# Patient Record
Sex: FEMALE | Race: WHITE | NOT HISPANIC OR LATINO | URBAN - METROPOLITAN AREA
[De-identification: names, ages, dates, MRNs, and addresses within clinical notes are randomized per-mention and may not be internally consistent; named-entity substitution may affect disease eponyms.]

---

## 2021-07-22 ENCOUNTER — EMERGENCY (EMERGENCY)
Facility: HOSPITAL | Age: 61
LOS: 1 days | Discharge: OTHER INSTITUTION | End: 2021-07-22
Attending: STUDENT IN AN ORGANIZED HEALTH CARE EDUCATION/TRAINING PROGRAM
Payer: MEDICARE

## 2021-07-22 VITALS
HEART RATE: 88 BPM | SYSTOLIC BLOOD PRESSURE: 111 MMHG | RESPIRATION RATE: 18 BRPM | OXYGEN SATURATION: 95 % | DIASTOLIC BLOOD PRESSURE: 72 MMHG | TEMPERATURE: 98 F

## 2021-07-22 VITALS
HEART RATE: 91 BPM | HEIGHT: 67 IN | WEIGHT: 199.96 LBS | SYSTOLIC BLOOD PRESSURE: 116 MMHG | RESPIRATION RATE: 18 BRPM | TEMPERATURE: 98 F | OXYGEN SATURATION: 93 % | DIASTOLIC BLOOD PRESSURE: 89 MMHG

## 2021-07-22 DIAGNOSIS — F32.9 MAJOR DEPRESSIVE DISORDER, SINGLE EPISODE, UNSPECIFIED: ICD-10-CM

## 2021-07-22 LAB
ALBUMIN SERPL ELPH-MCNC: 4.1 G/DL — SIGNIFICANT CHANGE UP (ref 3.3–5.2)
ALP SERPL-CCNC: 81 U/L — SIGNIFICANT CHANGE UP (ref 40–120)
ALT FLD-CCNC: 17 U/L — SIGNIFICANT CHANGE UP
AMPHET UR-MCNC: NEGATIVE — SIGNIFICANT CHANGE UP
ANION GAP SERPL CALC-SCNC: 10 MMOL/L — SIGNIFICANT CHANGE UP (ref 5–17)
APAP SERPL-MCNC: <3 UG/ML — LOW (ref 10–26)
APPEARANCE UR: CLEAR — SIGNIFICANT CHANGE UP
AST SERPL-CCNC: 20 U/L — SIGNIFICANT CHANGE UP
BACTERIA # UR AUTO: ABNORMAL
BARBITURATES UR SCN-MCNC: NEGATIVE — SIGNIFICANT CHANGE UP
BASOPHILS # BLD AUTO: 0.05 K/UL — SIGNIFICANT CHANGE UP (ref 0–0.2)
BASOPHILS NFR BLD AUTO: 0.4 % — SIGNIFICANT CHANGE UP (ref 0–2)
BENZODIAZ UR-MCNC: NEGATIVE — SIGNIFICANT CHANGE UP
BILIRUB SERPL-MCNC: 0.2 MG/DL — LOW (ref 0.4–2)
BILIRUB UR-MCNC: NEGATIVE — SIGNIFICANT CHANGE UP
BUN SERPL-MCNC: 13.2 MG/DL — SIGNIFICANT CHANGE UP (ref 8–20)
CALCIUM SERPL-MCNC: 9.2 MG/DL — SIGNIFICANT CHANGE UP (ref 8.6–10.2)
CHLORIDE SERPL-SCNC: 102 MMOL/L — SIGNIFICANT CHANGE UP (ref 98–107)
CO2 SERPL-SCNC: 27 MMOL/L — SIGNIFICANT CHANGE UP (ref 22–29)
COCAINE METAB.OTHER UR-MCNC: NEGATIVE — SIGNIFICANT CHANGE UP
COLOR SPEC: YELLOW — SIGNIFICANT CHANGE UP
CREAT SERPL-MCNC: 0.7 MG/DL — SIGNIFICANT CHANGE UP (ref 0.5–1.3)
DIFF PNL FLD: NEGATIVE — SIGNIFICANT CHANGE UP
EOSINOPHIL # BLD AUTO: 0.02 K/UL — SIGNIFICANT CHANGE UP (ref 0–0.5)
EOSINOPHIL NFR BLD AUTO: 0.2 % — SIGNIFICANT CHANGE UP (ref 0–6)
EPI CELLS # UR: ABNORMAL
ETHANOL SERPL-MCNC: <10 MG/DL — SIGNIFICANT CHANGE UP (ref 0–9)
GLUCOSE SERPL-MCNC: 112 MG/DL — HIGH (ref 70–99)
GLUCOSE UR QL: NEGATIVE MG/DL — SIGNIFICANT CHANGE UP
HCT VFR BLD CALC: 44.5 % — SIGNIFICANT CHANGE UP (ref 34.5–45)
HGB BLD-MCNC: 14.6 G/DL — SIGNIFICANT CHANGE UP (ref 11.5–15.5)
IMM GRANULOCYTES NFR BLD AUTO: 0.6 % — SIGNIFICANT CHANGE UP (ref 0–1.5)
KETONES UR-MCNC: NEGATIVE — SIGNIFICANT CHANGE UP
LEUKOCYTE ESTERASE UR-ACNC: NEGATIVE — SIGNIFICANT CHANGE UP
LYMPHOCYTES # BLD AUTO: 1.15 K/UL — SIGNIFICANT CHANGE UP (ref 1–3.3)
LYMPHOCYTES # BLD AUTO: 9.8 % — LOW (ref 13–44)
MCHC RBC-ENTMCNC: 28.7 PG — SIGNIFICANT CHANGE UP (ref 27–34)
MCHC RBC-ENTMCNC: 32.8 GM/DL — SIGNIFICANT CHANGE UP (ref 32–36)
MCV RBC AUTO: 87.4 FL — SIGNIFICANT CHANGE UP (ref 80–100)
METHADONE UR-MCNC: NEGATIVE — SIGNIFICANT CHANGE UP
MONOCYTES # BLD AUTO: 0.49 K/UL — SIGNIFICANT CHANGE UP (ref 0–0.9)
MONOCYTES NFR BLD AUTO: 4.2 % — SIGNIFICANT CHANGE UP (ref 2–14)
NEUTROPHILS # BLD AUTO: 9.94 K/UL — HIGH (ref 1.8–7.4)
NEUTROPHILS NFR BLD AUTO: 84.8 % — HIGH (ref 43–77)
NITRITE UR-MCNC: NEGATIVE — SIGNIFICANT CHANGE UP
OPIATES UR-MCNC: NEGATIVE — SIGNIFICANT CHANGE UP
PCP SPEC-MCNC: SIGNIFICANT CHANGE UP
PCP UR-MCNC: NEGATIVE — SIGNIFICANT CHANGE UP
PH UR: 6.5 — SIGNIFICANT CHANGE UP (ref 5–8)
PLATELET # BLD AUTO: 233 K/UL — SIGNIFICANT CHANGE UP (ref 150–400)
POTASSIUM SERPL-MCNC: 4.8 MMOL/L — SIGNIFICANT CHANGE UP (ref 3.5–5.3)
POTASSIUM SERPL-SCNC: 4.8 MMOL/L — SIGNIFICANT CHANGE UP (ref 3.5–5.3)
PROT SERPL-MCNC: 7.4 G/DL — SIGNIFICANT CHANGE UP (ref 6.6–8.7)
PROT UR-MCNC: 15 MG/DL
RBC # BLD: 5.09 M/UL — SIGNIFICANT CHANGE UP (ref 3.8–5.2)
RBC # FLD: 13 % — SIGNIFICANT CHANGE UP (ref 10.3–14.5)
RBC CASTS # UR COMP ASSIST: SIGNIFICANT CHANGE UP /HPF (ref 0–4)
SALICYLATES SERPL-MCNC: <0.6 MG/DL — LOW (ref 10–20)
SARS-COV-2 RNA SPEC QL NAA+PROBE: SIGNIFICANT CHANGE UP
SODIUM SERPL-SCNC: 139 MMOL/L — SIGNIFICANT CHANGE UP (ref 135–145)
SP GR SPEC: 1.01 — SIGNIFICANT CHANGE UP (ref 1.01–1.02)
THC UR QL: NEGATIVE — SIGNIFICANT CHANGE UP
TSH SERPL-MCNC: 1.31 UIU/ML — SIGNIFICANT CHANGE UP (ref 0.27–4.2)
UROBILINOGEN FLD QL: NEGATIVE MG/DL — SIGNIFICANT CHANGE UP
WBC # BLD: 11.72 K/UL — HIGH (ref 3.8–10.5)
WBC # FLD AUTO: 11.72 K/UL — HIGH (ref 3.8–10.5)
WBC UR QL: SIGNIFICANT CHANGE UP

## 2021-07-22 PROCEDURE — 36415 COLL VENOUS BLD VENIPUNCTURE: CPT

## 2021-07-22 PROCEDURE — 81001 URINALYSIS AUTO W/SCOPE: CPT

## 2021-07-22 PROCEDURE — U0003: CPT

## 2021-07-22 PROCEDURE — 80053 COMPREHEN METABOLIC PANEL: CPT

## 2021-07-22 PROCEDURE — 99291 CRITICAL CARE FIRST HOUR: CPT

## 2021-07-22 PROCEDURE — 90792 PSYCH DIAG EVAL W/MED SRVCS: CPT

## 2021-07-22 PROCEDURE — 93005 ELECTROCARDIOGRAM TRACING: CPT

## 2021-07-22 PROCEDURE — 84443 ASSAY THYROID STIM HORMONE: CPT

## 2021-07-22 PROCEDURE — 93010 ELECTROCARDIOGRAM REPORT: CPT

## 2021-07-22 PROCEDURE — U0005: CPT

## 2021-07-22 PROCEDURE — 85025 COMPLETE CBC W/AUTO DIFF WBC: CPT

## 2021-07-22 PROCEDURE — 80307 DRUG TEST PRSMV CHEM ANLYZR: CPT

## 2021-07-22 PROCEDURE — 93010 ELECTROCARDIOGRAM REPORT: CPT | Mod: 77

## 2021-07-22 PROCEDURE — 94640 AIRWAY INHALATION TREATMENT: CPT

## 2021-07-22 RX ORDER — IPRATROPIUM/ALBUTEROL SULFATE 18-103MCG
3 AEROSOL WITH ADAPTER (GRAM) INHALATION ONCE
Refills: 0 | Status: COMPLETED | OUTPATIENT
Start: 2021-07-22 | End: 2021-07-22

## 2021-07-22 RX ORDER — FLUOXETINE HCL 10 MG
20 CAPSULE ORAL DAILY
Refills: 0 | Status: DISCONTINUED | OUTPATIENT
Start: 2021-07-23 | End: 2021-07-26

## 2021-07-22 RX ADMIN — Medication 3 MILLILITER(S): at 12:20

## 2021-07-22 NOTE — ED PROVIDER NOTE - NS ED ROS FT
Constitutional: no fever, no chills  Head: NC, AT   Eyes: no redness   ENMT: no nasal congestion/drainage, no sore throat   CV: no chest pain, no edema, no palpitations   Resp: no cough, no dyspnea  GI: no abdominal pain, +nausea, no vomiting, no diarrhea  : no dysuria, no hematuria   Skin: no lesions, no rashes   Neuro: no LOC, no headache, no sensory deficits, +generalized weakness  Psych: +depression, +SA, denies AH/VH

## 2021-07-22 NOTE — ED BEHAVIORAL HEALTH ASSESSMENT NOTE - SUMMARY
Patient is a 60 year old female who is currently on SSD, living with her boyfriend of 20 years, with a self reported history of Depression and PTSD, with a history of multiple inpatient psych admissions, 1 prior suicide attempt by OD, and [facility], who was BIBA for OD of Trazodone.     Patient was seen and evaluated and found with worsening depression, feelings of hopelessness and helplessness status post suicide attempt by overdose. Patient is a current danger to self and requires inpatient psychiatric admission. Patient to be admitted under involuntary status (DOCS).

## 2021-07-22 NOTE — ED BEHAVIORAL HEALTH ASSESSMENT NOTE - DESCRIPTION
Vital Signs Last 24 Hrs  T(C): 36.9 (22 Jul 2021 10:31), Max: 36.9 (22 Jul 2021 10:31)  T(F): 98.4 (22 Jul 2021 10:31), Max: 98.4 (22 Jul 2021 10:31)  HR: 74 (22 Jul 2021 12:56) (74 - 91)  BP: 118/60 (22 Jul 2021 12:56) (116/89 - 118/60)  BP(mean): --  RR: 18 (22 Jul 2021 12:56) (18 - 18)  SpO2: 92% (22 Jul 2021 12:56) (92% - 93%) COPD never , no children, lives with boyfriend of 20 years. On SSD for her mental illness

## 2021-07-22 NOTE — ED BEHAVIORAL HEALTH ASSESSMENT NOTE - RISK ASSESSMENT
worsening depression, feelings of hopelessness and helplessness status post suicide attempt by overdose Moderate Acute Suicide Risk

## 2021-07-22 NOTE — ED ADULT NURSE REASSESSMENT NOTE - NS ED NURSE REASSESS COMMENT FT1
assumed care of patient.  Pt alert and cooperative. patient states that she took the trazodone to go to sleep and then to be admitted to hospital for rest.  Pt states that she does not receive any help from the  for her boyfriend of 20 years and feels overwhelmed.  Pt made aware of possible transfer to [facility] pending urine tox.  Will monitor an chart changes

## 2021-07-22 NOTE — ED BEHAVIORAL HEALTH ASSESSMENT NOTE - HPI (INCLUDE ILLNESS QUALITY, SEVERITY, DURATION, TIMING, CONTEXT, MODIFYING FACTORS, ASSOCIATED SIGNS AND SYMPTOMS)
Patient is a 60 year old female who is currently on SSD, living with her boyfriend of 20 years, with a self reported history of Depression and PTSD, with a history of multiple inpatient psych admissions, 1 prior suicide attempt by OD, and currently following with [facility], who was BIBA for OD of Trazodone.     Patient was seen and evaluated and found to be calm and cooperative. Patient stating that her depression has been worsening and states today she ended up taking her entire bottle of Trazodone. When asked about her intent she first stating "I done know, I just wanted to sleep" but when asked again did admit to it being a suicide attempt. Patient talking about recent stressors including her boy friends health conditions and financial stressors. Patient stats for weeks now she has no motivation to do anything, lays in bed but had sleep and appetite difficulties and has been having thoughts of dying but with no intent until today. Patient denies any homicidal ideations as well as any symptoms of nelli or A V hallucinations.     Collateral info taken from patients boyfriend Castro who states patient has been very depressed recently and spending a lot of time in bed but did not realize it was "this bad". he state s today he came into the kitchen and she didn't look well which is when she admitted to taking a bottle of pills which is  when he called 911.

## 2021-07-22 NOTE — ED BEHAVIORAL HEALTH ASSESSMENT NOTE - OTHER PAST PSYCHIATRIC HISTORY (INCLUDE DETAILS REGARDING ONSET, COURSE OF ILLNESS, INPATIENT/OUTPATIENT TREATMENT)
self reported history of Depression and PTSD (states she witnessed her fathers death as a child? )   History of multiple past admissions and 1 prior suicide attempt by OD. Patient follows with [facility] Psych NP Nithya Marmolejo

## 2021-07-22 NOTE — ED PROVIDER NOTE - PROGRESS NOTE DETAILS
Jose Enrique: I discussed the patient's case with poison control. Poison control recommends observing the patient for about 6 hours assuming she took trazodone immediate release vs 12-16 hours if she took trazodone extended release. Recommendation is supportive care with close attention to any CNS depression, bradycardia, hypotension, symptoms c/w serotonin syndrome. Plan is to keep patient on cardiac monitor, obtain labs, call poison control once labs return. Jose Enrique: I consulted psychiatry team. Jose Enrique: Patient stated that It would be okay for me to speak with her boyfriend Fredy "we don't have secrets." I updated Fredy on the phone. Jose Enrique: I reviewed patient's case with [place] poison control. Will obtain repeat EKG and continue to monitor. Vital signs wnl. Jose Enrique: I spoke with [facility]. Pt is prescribed immediate release trazodone. Per poison control, recommended observation period for IR trazodone is 6 hours. Jose Enrique: Case discussed with . Pt will be moved to  as she is medically clear.

## 2021-07-22 NOTE — ED PROVIDER NOTE - CLINICAL SUMMARY MEDICAL DECISION MAKING FREE TEXT BOX
60 year old female with history of multiple psychiatric admissions, PTSD, and COPD who presented following intentional overdose in suicide attempt. 60 year old female with history of multiple psychiatric admissions, PTSD, and COPD who presented following intentional overdose in suicide attempt. EKG x 2 without clinically significant qt prolongation or ischemic changes. Labs WNL and pt remained hemodynamically stable for greater than 6 hours in the ED. Pt medically cleared and will move to  for further eval.

## 2021-07-22 NOTE — ED PROVIDER NOTE - ATTENDING CONTRIBUTION TO CARE
Documentation, interpretation of results, consultation with other physicians.    59 y/o F with PMH COPD, PTSD presents for intentional overdose of two half bottles of 50 mg Trazadone around 830 this morning, vomited once around 9 am, complaining of generalized weakness and tiredness, no history of suicide attempts, but has felt depressed and suicidal thoughts in the past.     AP - Well appearing, NAD, moist mucus membranes, PERRL, pupils 3 mm symmetrically, no tremors or fasiculations, no pronator drift, no clonus, skin dry and warm, no flushing, abd soft, non-tender, non-distended, RRR, lungs with tight inspiratory and expiratory wheezing, no LE edema, normal Babinski, normal reflexes.    Consultation with poison control regarding trazadone overdose, EKG, cardiac and pulse ox monitoring, psych consult, will require psych admission when medically cleared.

## 2021-07-22 NOTE — ED PROVIDER NOTE - OBJECTIVE STATEMENT
60 year old female with history of multiple psychiatric admissions, PTSD, and COPD who presents following intentional overdose. This morning at 08:30 the patient swallowed 2 x 1/2 bottles of 30 day supply of trazodone 50 mg in a suicide attempt. At ~09:00 she had one episode of non-bloody emesis. She told her boyfriend Fredy, who she lives with, and he called EMS. On ED arrival, patient awake and alert. Reports some nausea and generalized weakness. No sweats, tremors, stiffness, fevers, chills, palpitations, chest pain, shortness of breath, or abdominal pain. No AH/VH. The patient states she did NOT take anything else this morning. Her daily medications are prozac 20 mg QAM and olazapine 10 mg QHS - she did not take her prozac this morning (or any of her olazapine). She follows with a therapist at [facility] and last spoke to them ~2 weeks ago. Has smoked about 10 cigarettes/day for the last 50 years. Denies alcohol and ilicit drug use.

## 2021-07-22 NOTE — CHART NOTE - NSCHARTNOTEFT_GEN_A_CORE
PATRICIA Note: PATRICIA made aware by  provider pt is in need of inpt psych trx on involuntary basis, DOCS legals completed. PATRICIA placed call to [facility], spoke to Emily who reports beds are available. PATRICIA faxed EKG and legals for review. PATRICIA received message back, case is under review, pending UA and Utox to acept pt. Pt provided sample, awaiting results at this time

## 2021-07-22 NOTE — ED PROVIDER NOTE - PHYSICAL EXAMINATION
General: well appearing, NAD  Head: NC, AT  EENT: pupils equal, EOMI, no scleral icterus, moist MM   Cardiac: RRR (HR 72), no apparent murmurs, no lower extremity edema  Respiratory: expiratory wheeze, no respiratory distress   Abdomen: soft, ND, NT, nonperitonitic  MSK/Vascular: full ROM, soft compartments, warm extremities, no rigidity  Neuro: AAOx3, sensation to light touch intact, 5/5 UE strength   Psych: mood "gloomy," +SI with SA this AM, no VH, no AH, no paranoia

## 2021-07-22 NOTE — ED ADULT NURSE REASSESSMENT NOTE - DESCRIPTION
Patient comes to  after being medically cleared in main ED where she presented after overdosing on Trazadone. Pt admitted she took overdose in a suicide attempt. Stated she has been depressed for several years, and has been taking medications, but they aren't effective and she is just becoming more depressed. Endorsed need for help, and expressed understanding of plan to send inpatient when bed available. Polite and cooperative with intake interview.

## 2021-07-22 NOTE — ED BEHAVIORAL HEALTH ASSESSMENT NOTE - NSPRESENTSXS_PSY_ALL_CORE
Eloped (saw a physician/midlevel provider but left without telling anyone) Depressed mood/Anhedonia/Hopelessness or despair

## 2021-07-22 NOTE — ED ADULT NURSE REASSESSMENT NOTE - NS ED NURSE REASSESS COMMENT FT1
Pt accepted at [facility] by Sr Naidu.  ambulance transportation arrangements made.  Awaiting on arrival.  Pt requesting to shower and set up for same.  Will monitor and chart changes and maintain safe environment

## 2021-09-04 ENCOUNTER — EMERGENCY (EMERGENCY)
Facility: HOSPITAL | Age: 61
LOS: 1 days | Discharge: OTHER INSTITUTION | End: 2021-09-04
Attending: STUDENT IN AN ORGANIZED HEALTH CARE EDUCATION/TRAINING PROGRAM
Payer: MEDICARE

## 2021-09-04 VITALS
OXYGEN SATURATION: 100 % | RESPIRATION RATE: 18 BRPM | HEART RATE: 65 BPM | TEMPERATURE: 98 F | HEIGHT: 67 IN | SYSTOLIC BLOOD PRESSURE: 103 MMHG | DIASTOLIC BLOOD PRESSURE: 70 MMHG | WEIGHT: 199.96 LBS

## 2021-09-04 DIAGNOSIS — T50.902D POISONING BY UNSPECIFIED DRUGS, MEDICAMENTS AND BIOLOGICAL SUBSTANCES, INTENTIONAL SELF-HARM, SUBSEQUENT ENCOUNTER: ICD-10-CM

## 2021-09-04 DIAGNOSIS — F32.9 MAJOR DEPRESSIVE DISORDER, SINGLE EPISODE, UNSPECIFIED: ICD-10-CM

## 2021-09-04 PROBLEM — F43.10 POST-TRAUMATIC STRESS DISORDER, UNSPECIFIED: Chronic | Status: ACTIVE | Noted: 2021-07-22

## 2021-09-04 PROBLEM — J44.9 CHRONIC OBSTRUCTIVE PULMONARY DISEASE, UNSPECIFIED: Chronic | Status: ACTIVE | Noted: 2021-07-22

## 2021-09-04 LAB
ALBUMIN SERPL ELPH-MCNC: 4.1 G/DL — SIGNIFICANT CHANGE UP (ref 3.3–5.2)
ALP SERPL-CCNC: 92 U/L — SIGNIFICANT CHANGE UP (ref 40–120)
ALT FLD-CCNC: 17 U/L — SIGNIFICANT CHANGE UP
AMPHET UR-MCNC: NEGATIVE — SIGNIFICANT CHANGE UP
ANION GAP SERPL CALC-SCNC: 13 MMOL/L — SIGNIFICANT CHANGE UP (ref 5–17)
APAP SERPL-MCNC: <3 UG/ML — LOW (ref 10–26)
APAP SERPL-MCNC: <3 UG/ML — LOW (ref 10–26)
APPEARANCE UR: CLEAR — SIGNIFICANT CHANGE UP
AST SERPL-CCNC: 20 U/L — SIGNIFICANT CHANGE UP
BARBITURATES UR SCN-MCNC: NEGATIVE — SIGNIFICANT CHANGE UP
BASE EXCESS BLDV CALC-SCNC: 3.3 MMOL/L — HIGH (ref -2–3)
BASOPHILS # BLD AUTO: 0.03 K/UL — SIGNIFICANT CHANGE UP (ref 0–0.2)
BASOPHILS NFR BLD AUTO: 0.3 % — SIGNIFICANT CHANGE UP (ref 0–2)
BENZODIAZ UR-MCNC: NEGATIVE — SIGNIFICANT CHANGE UP
BILIRUB SERPL-MCNC: 0.3 MG/DL — LOW (ref 0.4–2)
BILIRUB UR-MCNC: NEGATIVE — SIGNIFICANT CHANGE UP
BUN SERPL-MCNC: 14.3 MG/DL — SIGNIFICANT CHANGE UP (ref 8–20)
CA-I SERPL-SCNC: 1.12 MMOL/L — LOW (ref 1.15–1.33)
CALCIUM SERPL-MCNC: 9.4 MG/DL — SIGNIFICANT CHANGE UP (ref 8.6–10.2)
CHLORIDE BLDV-SCNC: 104 MMOL/L — SIGNIFICANT CHANGE UP (ref 98–107)
CHLORIDE SERPL-SCNC: 101 MMOL/L — SIGNIFICANT CHANGE UP (ref 98–107)
CK MB CFR SERPL CALC: 2.8 NG/ML — SIGNIFICANT CHANGE UP (ref 0–6.7)
CK SERPL-CCNC: 194 U/L — HIGH (ref 25–170)
CO2 SERPL-SCNC: 24 MMOL/L — SIGNIFICANT CHANGE UP (ref 22–29)
COCAINE METAB.OTHER UR-MCNC: NEGATIVE — SIGNIFICANT CHANGE UP
COLOR SPEC: YELLOW — SIGNIFICANT CHANGE UP
CREAT SERPL-MCNC: 0.74 MG/DL — SIGNIFICANT CHANGE UP (ref 0.5–1.3)
DIFF PNL FLD: NEGATIVE — SIGNIFICANT CHANGE UP
EOSINOPHIL # BLD AUTO: 0.06 K/UL — SIGNIFICANT CHANGE UP (ref 0–0.5)
EOSINOPHIL NFR BLD AUTO: 0.6 % — SIGNIFICANT CHANGE UP (ref 0–6)
EPI CELLS # UR: SIGNIFICANT CHANGE UP
ETHANOL SERPL-MCNC: <10 MG/DL — SIGNIFICANT CHANGE UP (ref 0–9)
GAS PNL BLDV: 137 MMOL/L — SIGNIFICANT CHANGE UP (ref 136–145)
GAS PNL BLDV: SIGNIFICANT CHANGE UP
GLUCOSE BLDV-MCNC: 113 MG/DL — HIGH (ref 70–99)
GLUCOSE SERPL-MCNC: 195 MG/DL — HIGH (ref 70–99)
GLUCOSE UR QL: NEGATIVE MG/DL — SIGNIFICANT CHANGE UP
HCO3 BLDV-SCNC: 28 MMOL/L — SIGNIFICANT CHANGE UP (ref 22–29)
HCT VFR BLD CALC: 41.4 % — SIGNIFICANT CHANGE UP (ref 34.5–45)
HCT VFR BLDA CALC: 40 % — SIGNIFICANT CHANGE UP (ref 34–46)
HGB BLD CALC-MCNC: 13.4 G/DL — SIGNIFICANT CHANGE UP (ref 11.7–16.1)
HGB BLD-MCNC: 13.9 G/DL — SIGNIFICANT CHANGE UP (ref 11.5–15.5)
IMM GRANULOCYTES NFR BLD AUTO: 0.8 % — SIGNIFICANT CHANGE UP (ref 0–1.5)
KETONES UR-MCNC: ABNORMAL
LACTATE BLDV-MCNC: 1.3 MMOL/L — SIGNIFICANT CHANGE UP (ref 0.5–2)
LEUKOCYTE ESTERASE UR-ACNC: ABNORMAL
LYMPHOCYTES # BLD AUTO: 1.43 K/UL — SIGNIFICANT CHANGE UP (ref 1–3.3)
LYMPHOCYTES # BLD AUTO: 13.7 % — SIGNIFICANT CHANGE UP (ref 13–44)
MCHC RBC-ENTMCNC: 29.3 PG — SIGNIFICANT CHANGE UP (ref 27–34)
MCHC RBC-ENTMCNC: 33.6 GM/DL — SIGNIFICANT CHANGE UP (ref 32–36)
MCV RBC AUTO: 87.3 FL — SIGNIFICANT CHANGE UP (ref 80–100)
METHADONE UR-MCNC: NEGATIVE — SIGNIFICANT CHANGE UP
MONOCYTES # BLD AUTO: 0.3 K/UL — SIGNIFICANT CHANGE UP (ref 0–0.9)
MONOCYTES NFR BLD AUTO: 2.9 % — SIGNIFICANT CHANGE UP (ref 2–14)
NEUTROPHILS # BLD AUTO: 8.56 K/UL — HIGH (ref 1.8–7.4)
NEUTROPHILS NFR BLD AUTO: 81.7 % — HIGH (ref 43–77)
NITRITE UR-MCNC: NEGATIVE — SIGNIFICANT CHANGE UP
OPIATES UR-MCNC: NEGATIVE — SIGNIFICANT CHANGE UP
PCO2 BLDV: 44 MMHG — HIGH (ref 39–42)
PCP SPEC-MCNC: SIGNIFICANT CHANGE UP
PCP UR-MCNC: NEGATIVE — SIGNIFICANT CHANGE UP
PH BLDV: 7.41 — SIGNIFICANT CHANGE UP (ref 7.32–7.43)
PH UR: 6 — SIGNIFICANT CHANGE UP (ref 5–8)
PLATELET # BLD AUTO: 229 K/UL — SIGNIFICANT CHANGE UP (ref 150–400)
PO2 BLDV: 126 MMHG — HIGH (ref 25–45)
POTASSIUM BLDV-SCNC: 3.7 MMOL/L — SIGNIFICANT CHANGE UP (ref 3.5–5.1)
POTASSIUM SERPL-MCNC: 3.5 MMOL/L — SIGNIFICANT CHANGE UP (ref 3.5–5.3)
POTASSIUM SERPL-SCNC: 3.5 MMOL/L — SIGNIFICANT CHANGE UP (ref 3.5–5.3)
PROT SERPL-MCNC: 7.6 G/DL — SIGNIFICANT CHANGE UP (ref 6.6–8.7)
PROT UR-MCNC: 15 MG/DL
RBC # BLD: 4.74 M/UL — SIGNIFICANT CHANGE UP (ref 3.8–5.2)
RBC # FLD: 12.8 % — SIGNIFICANT CHANGE UP (ref 10.3–14.5)
RBC CASTS # UR COMP ASSIST: SIGNIFICANT CHANGE UP /HPF (ref 0–4)
SALICYLATES SERPL-MCNC: <0.6 MG/DL — LOW (ref 10–20)
SAO2 % BLDV: 99.7 % — SIGNIFICANT CHANGE UP
SARS-COV-2 RNA SPEC QL NAA+PROBE: SIGNIFICANT CHANGE UP
SODIUM SERPL-SCNC: 138 MMOL/L — SIGNIFICANT CHANGE UP (ref 135–145)
SP GR SPEC: 1.02 — SIGNIFICANT CHANGE UP (ref 1.01–1.02)
THC UR QL: NEGATIVE — SIGNIFICANT CHANGE UP
TSH SERPL-MCNC: 1.3 UIU/ML — SIGNIFICANT CHANGE UP (ref 0.27–4.2)
UROBILINOGEN FLD QL: NEGATIVE MG/DL — SIGNIFICANT CHANGE UP
WBC # BLD: 10.46 K/UL — SIGNIFICANT CHANGE UP (ref 3.8–10.5)
WBC # FLD AUTO: 10.46 K/UL — SIGNIFICANT CHANGE UP (ref 3.8–10.5)
WBC UR QL: SIGNIFICANT CHANGE UP

## 2021-09-04 PROCEDURE — 99220: CPT

## 2021-09-04 PROCEDURE — 93010 ELECTROCARDIOGRAM REPORT: CPT

## 2021-09-04 PROCEDURE — 90792 PSYCH DIAG EVAL W/MED SRVCS: CPT

## 2021-09-04 RX ORDER — SODIUM CHLORIDE 9 MG/ML
1000 INJECTION INTRAMUSCULAR; INTRAVENOUS; SUBCUTANEOUS ONCE
Refills: 0 | Status: COMPLETED | OUTPATIENT
Start: 2021-09-04 | End: 2021-09-04

## 2021-09-04 RX ADMIN — SODIUM CHLORIDE 1000 MILLILITER(S): 9 INJECTION INTRAMUSCULAR; INTRAVENOUS; SUBCUTANEOUS at 11:36

## 2021-09-04 NOTE — ED PROVIDER NOTE - CLINICAL SUMMARY MEDICAL DECISION MAKING FREE TEXT BOX
59yo female with pmh of depression and anxiety presents with overdose and SA. 61yo female with pmh of depression and anxiety presents with overdose and SA. Pt observed for more than 6 hours, more awake now, depressed affect, EKG at baseline, labs wnl, no acute tele events, pt cleared medically for BH eval

## 2021-09-04 NOTE — ED CDU PROVIDER INITIAL DAY NOTE - MEDICAL DECISION MAKING DETAILS
59yo female with pmh of depression and anxiety presents with overdose and SA. Pt observed for more than 6 hours, more awake now, depressed affect, EKG at baseline, labs wnl, no acute tele events, pt cleared medically for BH eval

## 2021-09-04 NOTE — ED BEHAVIORAL HEALTH ASSESSMENT NOTE - DESCRIPTION
COPD T(C): 36.3 (09-04-21 @ 20:06), Max: 36.8 (09-04-21 @ 14:31)  T(F): 97.3 (09-04-21 @ 20:06), Max: 98.3 (09-04-21 @ 14:31)  HR: 98 (09-04-21 @ 20:06) (65 - 98)  BP: 181/101 (09-04-21 @ 20:06) (103/70 - 181/101)  RR: 18 (09-04-21 @ 20:06) (18 - 20)  SpO2: 97% (09-04-21 @ 20:06) (95% - 100%) never , no children, lives with boyfriend of 20 years. On SSD for her mental illness

## 2021-09-04 NOTE — ED PROVIDER NOTE - CARE PLAN
1 Principal Discharge DX:	Overdose  Secondary Diagnosis:	Suicidal behavior with attempted self-injury

## 2021-09-04 NOTE — ED BEHAVIORAL HEALTH ASSESSMENT NOTE - NSBHDSMAXES_PSY_ALL_CORE
Detail Level: Zone
Additional Notes: Monitor lesion; if it not resolved with liquid nitrogen, consider biopsy.
See above

## 2021-09-04 NOTE — ED ADULT NURSE NOTE - OBJECTIVE STATEMENT
OB pt ambulatory into ED, BIBA, from home a&ox3, no acute distress, breaths even and unlabored c/o SI x 1 week. as per ems, pt took 20 pills of her 10mg olanzapine and 20 pills of her 1mg minipress at about 0920am today. denies HI. denies etoh use. dr. riggs called to bedside for eval.

## 2021-09-04 NOTE — ED PROVIDER NOTE - PHYSICAL EXAMINATION
Const: Drowsy easily arousable to verbal stimuli but goes back to sleep In no acute distress. Well appearing.  HEENT: NC/AT. Moist mucous membranes.  Eyes: No scleral icterus. EOMI. 2mm PERRL  Neck:. Soft and supple. Full ROM without pain.  Cardiac: Regular rate and regular rhythm. +S1/S2. Peripheral pulses 2+ and symmetric. No LE edema.  Resp: Speaking in full sentences. No evidence of respiratory distress. No wheezes, rales or rhonchi.  Abd: Soft, non-tender, non-distended. Normal bowel sounds in all 4 quadrants. No guarding or rebound.  Back: Spine midline and non-tender. No CVAT.  Skin: No rashes, abrasions or lacerations.  Lymph: No cervical lymphadenopathy.  Neuro: Awake, alert & oriented x 3. Moves all extremities symmetrically.

## 2021-09-04 NOTE — ED BEHAVIORAL HEALTH ASSESSMENT NOTE - ADDITIONAL DETAILS / COMMENTS
Pt present with periods of confusion and disorientation likely delirium from OD, need redirection.  Dr Castellon informed of AMS and need for further monitoring prior to transfer to inpt psych.

## 2021-09-04 NOTE — ED PROVIDER NOTE - OBJECTIVE STATEMENT
59yo female with pmh of depression and anxiety presents with overdose and SA. PT states she doesn't want to live anymore because there's nothing left her for her. PT states that prior to arrival unsure what time she took 20 pills of 10mg olanzapine and 20 pills of 1mg minipress. Pt states her  called EMS. No physical signs of trauma

## 2021-09-04 NOTE — ED BEHAVIORAL HEALTH ASSESSMENT NOTE - SUMMARY
Patient is a 60 year old female who is currently on SSD, living with her boyfriend of 20 years, with a self reported history of Depression and PTSD, with a history of multiple inpatient psych admissions, last July, 2021,  2 prior suicide attempt by OD, and currently following with [facility], who was BIBA EMS activated by BF s/p intentional OD of her prescribed meds.  Pt reportedly took 20, 10 mg Olanzapine and 20,  1mg Minipress in an attempt to end her life.  Pt observed in main ED and now in BH area where she is disoriented and confused asking for her clothes from the dryer.  Pt paranoid and thinks people are stealing her belongings.  Pt ambulating to bathroom to give urine, urinate without giving specimen.  Pt needs redirection to her room.  Pt admitted to taking an OD, claiming she has nothing to live for.  Asked about her  and states she has a "fake " as they are not .  Pt states she used to want to get  but no longer.  When asked about the OD stated she did not want o talk about in and c/o not feeling well holding her abdomen but denies nausea.  Collaterals obtained from SO, Castro who reports they both woke up at the same time, he showered and asked Pt to make him toast.  He left the home for a short while and when he returned he learned she took an OD of her prescribed meds and he activated 911.  Castro reported she has been depressed and gets tx at [facility] and feels she did not get adequate therapy there.  Pt Prozac has been 40 mg since last admission in July, Olanzapine 10 mg qd, Minipress for "sleep" and no longer takes Trazodone due to last suicide attempt by OD Trazodone.  Castro denies Pt uses drugs or alcohol.  Pt will require psych admission when medically cleared as she is presenting with delirium from OD.  Will reeval in am when more alert and pre-transfer requirements.

## 2021-09-04 NOTE — ED ADULT NURSE NOTE - INTERVENTIONS DEFINITIONS
Instruct patient to call for assistance/Room bathroom lighting operational/Stretcher in lowest position, wheels locked, appropriate side rails in place/Provide visual cue, wrist band, yellow gown, etc.

## 2021-09-04 NOTE — ED PROVIDER NOTE - NSICDXPASTMEDICALHX_GEN_ALL_CORE_FT
PAST MEDICAL HISTORY:  COPD (chronic obstructive pulmonary disease)     PTSD (post-traumatic stress disorder)

## 2021-09-04 NOTE — ED ADULT TRIAGE NOTE - CHIEF COMPLAINT QUOTE
pt ambulatory into ED, BIBA, from home a&ox3, no acute distress, breaths even and unlabored c/o SI x 1 week. as per ems, pt took 20 pills of her 10mg olanzapine and 20 pills of her 1mg minipress at about 0920am today. denies HI. denies etoh use. dr. riggs called to bedside for eval.

## 2021-09-04 NOTE — ED CDU PROVIDER INITIAL DAY NOTE - OBJECTIVE STATEMENT
61yo female with pmh of depression and anxiety presents with overdose and SA. PT states she doesn't want to live anymore because there's nothing left her for her. PT states that prior to arrival unsure what time she took 20 pills of 10mg olanzapine and 20 pills of 1mg minipress. Pt states her  called EMS. No physical signs of trauma

## 2021-09-04 NOTE — ED BEHAVIORAL HEALTH ASSESSMENT NOTE - OTHER PAST PSYCHIATRIC HISTORY (INCLUDE DETAILS REGARDING ONSET, COURSE OF ILLNESS, INPATIENT/OUTPATIENT TREATMENT)
self reported history of Depression and PTSD (states she witnessed her fathers death as a child? )   History of multiple past admissions and 2 prior suicide attempt by OD. Patient follows with [facility] Psych NP Nithya Marmolejo

## 2021-09-04 NOTE — ED BEHAVIORAL HEALTH ASSESSMENT NOTE - DETAILS
states she witnessed the death of her father ? n/a Telepsych, ED MD s/p OD attempt, 3rd attempt by OD na

## 2021-09-04 NOTE — CONSULT NOTE ADULT - ASSESSMENT
61 yo female with pmhx PTSD and depression presenting s/p ingestion of olanzapine and prazosin. Concern for acute toxicity, can expect hypotension, reflexive tachycardia, miotic pupils, agitation (or CNS depression).    -Recommend observe for 6 hours from time of ingestion, cardiac monitor  -Obtain repeat EKG in few hours to assess Qtc changes  -Obtain CK level, 4 hours APAP level  -Management is supportive care with IVF for hypotension/tachycardia  -Current contraindication to activated charcoal is mental status (patient is sleepy, snoring), will hold off on AC  -Can be cleared from tox perspective if clinically and hemodynamically stable after 6 hours

## 2021-09-04 NOTE — ED BEHAVIORAL HEALTH ASSESSMENT NOTE - HPI (INCLUDE ILLNESS QUALITY, SEVERITY, DURATION, TIMING, CONTEXT, MODIFYING FACTORS, ASSOCIATED SIGNS AND SYMPTOMS)
Patient is a 60 year old female who is currently on SSD, living with her boyfriend of 20 years, with a self reported history of Depression and PTSD, with a history of multiple inpatient psych admissions, last July, 2021,  2 prior suicide attempt by OD, and currently following with [facility], who was BIBA EMS activated by BF s/p intentional OD of her prescribed meds.  Pt reportedly took 20, 10 mg Olanzapine and 20,  1mg Minipress in an attempt to end her life.  Pt observed in main ED and now in BH area where she is disoriented and confused asking for her clothes from the dryer.  Pt paranoid and thinks people are stealing her belongings.  Pt ambulating to bathroom to give urine, urinate without giving specimen.  Pt needs redirection to her room.  Pt admitted to taking an OD, claiming she has nothing to live for.  Asked about her  and states she has a "fake " as they are not .  Pt states she used to want to get  but no longer.  When asked about the OD stated she did not want o talk about in and c/o not feeling well holding her abdomen but denies nausea.  Collaterals obtained from SO, Castro who reports they both woke up at the same time, he showered and asked Pt to make him toast.  He left the home for a short while and when he returned he learned she took an OD of her prescribed meds and he activated 911.  Castro reported she has been depressed and gets tx at [facility] and feels she did not get adequate therapy there.  Pt Prozac has been 40 mg since last admission in July, Olanzapine 10 mg qd, Minipress for "sleep" and no longer takes Trazodone due to last suicide attempt by OD Trazodone.  Castro denies Pt uses drugs or alcohol.  Pt will require psych admission when medically cleared as she is presenting with delirium from OD.  Will reeval in am when more alert and pre-transfer requirements.

## 2021-09-04 NOTE — ED BEHAVIORAL HEALTH ASSESSMENT NOTE - RISK ASSESSMENT
RF depression, 3rd suicide attempt with suicidal intent  PF supportive bf, in tx High Acute Suicide Risk

## 2021-09-05 ENCOUNTER — INPATIENT (INPATIENT)
Facility: HOSPITAL | Age: 61
LOS: 4 days | Discharge: HOME | DRG: 918 | End: 2021-09-10
Attending: PSYCHIATRY & NEUROLOGY | Admitting: PSYCHIATRY & NEUROLOGY
Payer: MEDICARE

## 2021-09-05 VITALS
TEMPERATURE: 98 F | RESPIRATION RATE: 18 BRPM | DIASTOLIC BLOOD PRESSURE: 85 MMHG | SYSTOLIC BLOOD PRESSURE: 153 MMHG | OXYGEN SATURATION: 95 % | HEART RATE: 92 BPM

## 2021-09-05 DIAGNOSIS — F31.9 BIPOLAR DISORDER, UNSPECIFIED: ICD-10-CM

## 2021-09-05 LAB
COVID-19 SPIKE DOMAIN AB INTERP: POSITIVE
COVID-19 SPIKE DOMAIN ANTIBODY RESULT: >250 U/ML — HIGH
SARS-COV-2 IGG+IGM SERPL QL IA: >250 U/ML — HIGH
SARS-COV-2 IGG+IGM SERPL QL IA: POSITIVE

## 2021-09-05 PROCEDURE — 82947 ASSAY GLUCOSE BLOOD QUANT: CPT

## 2021-09-05 PROCEDURE — 80307 DRUG TEST PRSMV CHEM ANLYZR: CPT

## 2021-09-05 PROCEDURE — 82435 ASSAY OF BLOOD CHLORIDE: CPT

## 2021-09-05 PROCEDURE — G1004: CPT

## 2021-09-05 PROCEDURE — 70450 CT HEAD/BRAIN W/O DYE: CPT | Mod: ME

## 2021-09-05 PROCEDURE — 36415 COLL VENOUS BLD VENIPUNCTURE: CPT

## 2021-09-05 PROCEDURE — 80053 COMPREHEN METABOLIC PANEL: CPT

## 2021-09-05 PROCEDURE — 83605 ASSAY OF LACTIC ACID: CPT

## 2021-09-05 PROCEDURE — G0480: CPT

## 2021-09-05 PROCEDURE — 84295 ASSAY OF SERUM SODIUM: CPT

## 2021-09-05 PROCEDURE — U0005: CPT

## 2021-09-05 PROCEDURE — 85014 HEMATOCRIT: CPT

## 2021-09-05 PROCEDURE — U0003: CPT

## 2021-09-05 PROCEDURE — 85018 HEMOGLOBIN: CPT

## 2021-09-05 PROCEDURE — G0378: CPT

## 2021-09-05 PROCEDURE — 83036 HEMOGLOBIN GLYCOSYLATED A1C: CPT

## 2021-09-05 PROCEDURE — 84443 ASSAY THYROID STIM HORMONE: CPT

## 2021-09-05 PROCEDURE — 93005 ELECTROCARDIOGRAM TRACING: CPT

## 2021-09-05 PROCEDURE — 86769 SARS-COV-2 COVID-19 ANTIBODY: CPT

## 2021-09-05 PROCEDURE — 85025 COMPLETE CBC W/AUTO DIFF WBC: CPT

## 2021-09-05 PROCEDURE — 99285 EMERGENCY DEPT VISIT HI MDM: CPT

## 2021-09-05 PROCEDURE — 99211 OFF/OP EST MAY X REQ PHY/QHP: CPT

## 2021-09-05 PROCEDURE — 99217: CPT

## 2021-09-05 PROCEDURE — 82550 ASSAY OF CK (CPK): CPT

## 2021-09-05 PROCEDURE — 82330 ASSAY OF CALCIUM: CPT

## 2021-09-05 PROCEDURE — 82553 CREATINE MB FRACTION: CPT

## 2021-09-05 PROCEDURE — 84132 ASSAY OF SERUM POTASSIUM: CPT

## 2021-09-05 PROCEDURE — 99221 1ST HOSP IP/OBS SF/LOW 40: CPT

## 2021-09-05 PROCEDURE — 81001 URINALYSIS AUTO W/SCOPE: CPT

## 2021-09-05 PROCEDURE — 80061 LIPID PANEL: CPT

## 2021-09-05 PROCEDURE — 70450 CT HEAD/BRAIN W/O DYE: CPT | Mod: 26,ME

## 2021-09-05 PROCEDURE — 82803 BLOOD GASES ANY COMBINATION: CPT

## 2021-09-05 RX ORDER — ACETAMINOPHEN 500 MG
650 TABLET ORAL EVERY 6 HOURS
Refills: 0 | Status: DISCONTINUED | OUTPATIENT
Start: 2021-09-05 | End: 2021-09-10

## 2021-09-05 RX ORDER — LANOLIN ALCOHOL/MO/W.PET/CERES
3 CREAM (GRAM) TOPICAL AT BEDTIME
Refills: 0 | Status: DISCONTINUED | OUTPATIENT
Start: 2021-09-05 | End: 2021-09-10

## 2021-09-05 RX ORDER — DIPHENHYDRAMINE HCL 50 MG
50 CAPSULE ORAL EVERY 6 HOURS
Refills: 0 | Status: DISCONTINUED | OUTPATIENT
Start: 2021-09-05 | End: 2021-09-10

## 2021-09-05 RX ORDER — INFLUENZA VIRUS VACCINE 15; 15; 15; 15 UG/.5ML; UG/.5ML; UG/.5ML; UG/.5ML
0.5 SUSPENSION INTRAMUSCULAR ONCE
Refills: 0 | Status: DISCONTINUED | OUTPATIENT
Start: 2021-09-05 | End: 2021-09-10

## 2021-09-05 RX ORDER — HALOPERIDOL DECANOATE 100 MG/ML
2 INJECTION INTRAMUSCULAR EVERY 6 HOURS
Refills: 0 | Status: DISCONTINUED | OUTPATIENT
Start: 2021-09-05 | End: 2021-09-10

## 2021-09-05 NOTE — BEHAVIORAL HEALTH ASSESSMENT NOTE - ABUSE / TRAUMA HISTORY
STOP Cefdinir    START Augmentin, TWICE DAILY x 10 DAYS    START Florastor, TWICE DAILY x 10 DAYS (for diarrhea)    RECHECK in office in 1 WEEK             Ear Infection (Otitis Media) in Babies 0 to 2 Years: Care Instructions  Your Care Instructions    An ear infection may start with a cold and affect the middle ear. This is called otitis media. It can hurt a lot. Children with ear infections often fuss and cry, pull at their ears, and sleep poorly. Ear infections are common in babies and young children. Your doctor may prescribe antibiotics to treat the ear infection. Children under 6 months are usually given an antibiotic. If your child is over 7 months old and the symptoms are mild, antibiotics may not be needed. Your doctor may also recommend medicines to help with fever or pain. Follow-up care is a key part of your child's treatment and safety. Be sure to make and go to all appointments, and call your doctor if your child is having problems. It's also a good idea to know your child's test results and keep a list of the medicines your child takes. How can you care for your child at home? · Give your child acetaminophen (Tylenol) or ibuprofen (Advil, Motrin) for fever, pain, or fussiness. Do not use ibuprofen if your child is less than 6 months old unless the doctor gave you instructions to use it. Be safe with medicines. For children 6 months and older, read and follow all instructions on the label. · If the doctor prescribed antibiotics for your child, give them as directed. Do not stop using them just because your child feels better. Your child needs to take the full course of antibiotics. · Place a warm washcloth on your child's ear for pain. · Try to keep your child resting quietly. Resting will help the body fight the infection. When should you call for help? Call 911 anytime you think your child may need emergency care.  For example, call if:    · Your child is extremely sleepy or hard to wake up.    Call your doctor now or seek immediate medical care if:    · Your child seems to be getting much sicker.     · Your child has a new or higher fever.     · Your child's ear pain is getting worse.     · Your child has redness or swelling around or behind the ear.    Watch closely for changes in your child's health, and be sure to contact your doctor if:    · Your child has new or worse discharge from the ear.     · Your child is not getting better after 2 days (48 hours).     · Your child has any new symptoms, such as hearing problems, after the ear infection has cleared. Where can you learn more? Go to http://vidal-stephan.info/. Enter W838 in the search box to learn more about \"Ear Infection (Otitis Media) in Babies 0 to 2 Years: Care Instructions. \"  Current as of: October 21, 2018  Content Version: 12.2  © 8093-9373 [employer]. Care instructions adapted under license by [employer] (which disclaims liability or warranty for this information). If you have questions about a medical condition or this instruction, always ask your healthcare professional. [address] any warranty or liability for your use of this information. Yes

## 2021-09-05 NOTE — ED ADULT NURSE REASSESSMENT NOTE - GENERAL PATIENT STATE
resting/sleeping
comfortable appearance/cooperative
comfortable appearance/cooperative
resting/sleeping
comfortable appearance/resting/sleeping

## 2021-09-05 NOTE — BEHAVIORAL HEALTH ASSESSMENT NOTE - NSBHCHARTREVIEWVS_PSY_A_CORE FT
Vital Signs Last 24 Hrs  T(C): 36.6 (05 Sep 2021 14:58), Max: 36.8 (04 Sep 2021 17:34)  T(F): 97.8 (05 Sep 2021 14:58), Max: 98.3 (04 Sep 2021 17:34)  HR: 98 (05 Sep 2021 14:58) (86 - 98)  BP: 143/83 (05 Sep 2021 14:58) (116/76 - 181/101)  BP(mean): --  RR: 18 (05 Sep 2021 14:58) (18 - 20)  SpO2: 96% (05 Sep 2021 14:58) (96% - 97%)

## 2021-09-05 NOTE — ED ADULT NURSE REASSESSMENT NOTE - NS ED NURSE REASSESS COMMENT FT1
EMS present and paperwork and report given.
Nurse to nurse report called to Rox MITCHELL at [address] [place] [address].  Safety of patient maintained.
Pt care endorsed at this time
Pt moved to   - verbal report given to Brooks - all questions answered. IV removed prior to transfer to  , care endorsed to  nurse Brooks
assumed care of patient. Pt alert and remains disorganized.  Pt having difficulties follow direction without supervision amd constant redirection.  Blood work drawn as per orders and sent to lab.  patient informed that need for urine sample and state unable to go at present apollo.  Cup left at bedside.  Will monitor and chart changes and maintain safe environment
patient out of bed to bathroom  Urine sample obtained and sent to lab.  patient back to bed will monitor and chart changes
patient out of bed to bathroom appears to becoming more organized and ambulates with steadier gait.  Will monitor and chart changes
Assumed care of pt @1930. Pt awaiting transfer to . Pt aware of POC and wait time. states she had a OD on pills. denies current SI. Pt offers no other complaints. VSS. will monitor the pt for safety.
Patient ate 100% of her meal.  No attempts to harm self or others.  Safety of patient maintained.
Assumed care of patient at 0715.  Patient resting in bed appears to be sleeping with no distress with regular non labored breathing.  Safety of patient maintained.
Patient ate 100% of meal.  Patient denies suicidal or homicidal ideations.  No attempts to harm self or others.  Safety of patient maintained.

## 2021-09-05 NOTE — ED CDU PROVIDER DISPOSITION NOTE - CLINICAL COURSE
61yo female with pmh of depression and anxiety presents with overdose and SA. PT states she doesn't want to live anymore because there's nothing left her for her. PT states that prior to arrival unsure what time she took 20 pills of 10mg olanzapine and 20 pills of 1mg minipress. Pt states her  called EMS. No physical signs of trauma. Pt cleared medically evaluated BH requiring inpatient admission, involuntary.

## 2021-09-05 NOTE — BEHAVIORAL HEALTH ASSESSMENT NOTE - HPI (INCLUDE ILLNESS QUALITY, SEVERITY, DURATION, TIMING, CONTEXT, MODIFYING FACTORS, ASSOCIATED SIGNS AND SYMPTOMS)
Patient is a 60 year old female who is currently on SSD, living with her boyfriend of 20 years, with a self reported history of Depression and PTSD, with a history of multiple inpatient psych admissions, last July, 2021,  2 prior suicide attempt by OD, and currently following with [facility], who was BIBA EMS activated by BF s/p intentional OD of her prescribed meds.  Pt reportedly took 20, 10 mg Olanzapine and 20,  1mg Minipress in an attempt to end her life.  Pt observed in main ED and now in BH area where she is disoriented and confused asking for her clothes from the dryer.  Pt paranoid and thinks people are stealing her belongings.  Pt ambulating to bathroom to give urine, urinate without giving specimen.  Pt needs redirection to her room.  Pt admitted to taking an OD, claiming she has nothing to live for.  Asked about her  and states she has a "fake " as they are not .  Pt states she used to want to get  but no longer.  When asked about the OD stated she did not want o talk about in and c/o not feeling well holding her abdomen but denies nausea.  Collaterals obtained from SO, Castro who reports they both woke up at the same time, he showered and asked Pt to make him toast.  He left the home for a short while and when he returned he learned she took an OD of her prescribed meds and he activated 911.  Castro reported she has been depressed and gets tx at [facility] and feels she did not get adequate therapy there.  Pt Prozac has been 40 mg since last admission in July, Olanzapine 10 mg qd, Minipress for "sleep" and no longer takes Trazodone due to last suicide attempt by OD Trazodone.  Castro denies Pt uses drugs or alcohol.  Pt will require psych admission when medically cleared as she is presenting with delirium from OD.  Will reeval in am when more alert and pre-transfer requirements.

## 2021-09-05 NOTE — BEHAVIORAL HEALTH ASSESSMENT NOTE - NSBHCHARTREVIEWLAB_PSY_A_CORE FT
13.9   10.46 )-----------( 229      ( 04 Sep 2021 11:44 )             41.4   09-04    138  |  101  |  14.3  ----------------------------<  195<H>  3.5   |  24.0  |  0.74    Ca    9.4      04 Sep 2021 11:44    TPro  7.6  /  Alb  4.1  /  TBili  0.3<L>  /  DBili  x   /  AST  20  /  ALT  17  /  AlkPhos  92  09-04    COVID-19 PCR: NotDetec (04 Sep 2021 11:44)  COVID-19 PCR: NotDetec (22 Jul 2021 12:26)

## 2021-09-05 NOTE — BEHAVIORAL HEALTH ASSESSMENT NOTE - SUMMARY
Patient is a 60 year old female who is currently on SSD, living with her boyfriend of 20 years, with a self reported history of Depression and PTSD, with a history of multiple inpatient psych admissions, last July, 2021,  2 prior suicide attempt by OD, and currently following with [facility], who was BIBA EMS activated by BF s/p intentional OD of her prescribed meds.  Pt reportedly took 20, 10 mg Olanzapine and 20,  1mg Minipress in an attempt to end her life.  Pt observed in main ED and now in BH area where she is disoriented and confused asking for her clothes from the dryer.  Pt paranoid and thinks people are stealing her belongings.  Pt ambulating to bathroom to give urine, urinate without giving specimen.  Pt needs redirection to her room.  Pt admitted to taking an OD, claiming she has nothing to live for.  Asked about her  and states she has a "fake " as they are not .  Pt states she used to want to get  but no longer.  When asked about the OD stated she did not want o talk about in and c/o not feeling well holding her abdomen but denies nausea.  Collaterals obtained from SO, Castro who reports they both woke up at the same time, he showered and asked Pt to make him toast.  He left the home for a short while and when he returned he learned she took an OD of her prescribed meds and he activated 911.  Castro reported she has been depressed and gets tx at [facility] and feels she did not get adequate therapy there.  Pt Prozac has been 40 mg since last admission in July, Olanzapine 10 mg qd, Minipress for "sleep" and no longer takes Trazodone due to last suicide attempt by OD Trazodone.  Castro denies Pt uses drugs or alcohol.  Pt will require psych admission when medically cleared as she is presenting with delirium from OD.  Will reeval in am when more alert and pre-transfer requirements.

## 2021-09-05 NOTE — BEHAVIORAL HEALTH ASSESSMENT NOTE - RISK ASSESSMENT
High Acute Suicide Risk RF depression, 3rd suicide attempt with suicidal intent  PF supportive bf, in tx

## 2021-09-05 NOTE — ED BEHAVIORAL HEALTH NOTE - BEHAVIORAL HEALTH NOTE
PROGRESS NOTE: 21 @ 11:26  	  • Reason for Ongoing Consultation: 	    ID: 60yyo Female with HEALTH ISSUES - PROBLEM Dx:  Depressive disorder    Suicide attempt by drug ingestion, subsequent encounter            INTERVAL DATA:   • Interval Chief Complaint: "Where am I going??"  • Interval History:   Seen on follow up.  Continues somewhat disoriented but much improved from yesterday.  Pt admits to not wanting to live.  She is aware iof need for psych admissions and asked where she was going.  Bal and tox neg.  BF called and was informed of psych admission.  Awaiting CT scan for final clearance due to AMS/ confusion last evening.  Gait is now steady.    REVIEW OF SYSTEMS:   • Constitutional Symptoms	No complaints  • Eyes	No complaints  • Ears / Nose / Throat / Mouth	No complaints  • Cardiovascular	No complaints  • Respiratory	No complaints  • Gastrointestinal	No complaints  • Genitourinary	No complaints  • Musculoskeletal	No complaints  • Skin	No complaints  • Neurological	No complaints  • Psychiatric (see HPI)	See HPI  • Endocrine	No complaints  • Hematologic / Lymphatic	No complaints  • Allergic / Immunologic	No complaints    REVIEW OF VITALS/LABS/IMAGING/INVESTIGATIONS:   • Vital signs reviewed: Yes  • Vital Signs:	    T(C): 36.6 (21 @ 04:16), Max: 36.8 (21 @ 14:31)  HR: 94 (21 @ 07:29) (86 - 98)  BP: 147/78 (21 @ 07:29) (116/76 - 181/101)  RR: 18 (21 @ 07:29) (18 - 20)  SpO2: 96% (21 @ 07:29) (95% - 100%)    • Available labs reviewed: Yes  • Available Lab Results:                           13.9   10.46 )-----------( 229      ( 04 Sep 2021 11:44 )             41.4         138  |  101  |  14.3  ----------------------------<  195<H>  3.5   |  24.0  |  0.74    Ca    9.4      04 Sep 2021 11:44    TPro  7.6  /  Alb  4.1  /  TBili  0.3<L>  /  DBili  x   /  AST  20  /  ALT  17  /  AlkPhos  92      LIVER FUNCTIONS - ( 04 Sep 2021 11:44 )  Alb: 4.1 g/dL / Pro: 7.6 g/dL / ALK PHOS: 92 U/L / ALT: 17 U/L / AST: 20 U/L / GGT: x             Urinalysis Basic - ( 04 Sep 2021 21:59 )    Color: Yellow / Appearance: Clear / S.020 / pH: x  Gluc: x / Ketone: Small  / Bili: Negative / Urobili: Negative mg/dL   Blood: x / Protein: 15 mg/dL / Nitrite: Negative   Leuk Esterase: Trace / RBC: 0-2 /HPF / WBC 0-2   Sq Epi: x / Non Sq Epi: Few / Bacteria: x          MEDICATIONS:      PRN Medications:  • PRN Medications since last evaluation	  • PRN Details	    Current Medications:      Medication Side Effects:  • Medication Side Effects or Adverse Reactions (new or ongoing)	None known    MENTAL STATUS EXAM:   • Level of Consciousness	Alert  • General Appearance	Well developed  • Body Habitus	Well nourished  • Hygiene	fair  • Grooming	poor  • Behavior	Cooperative  • Eye Contact	fair  • Relatedness	fair  • Impulse Control	Normal  • Muscle Tone / Strength	not assessed  • Abnormal Movements	No abnormal movements  • Gait / Station	Normal gait / station  • Speech Volume	Normal  • Speech Rate	slowed    • Speech Spontaneity	delayed  • Speech Articulation	Normal  • Mood	depressed  • Affect Quality	depressed  • Affect Range	constricted  • Affect Congruence	Congruent  • Thought Process	less disorganized  • Thought Associations	Normal  • Thought Content	depression SI, suspicious  • Perceptions	No abnormalities  • Oriented to Time	Yes  • Oriented to Place	Yes  • Oriented to Situation	Yes  • Oriented to Person	Yes  • Attention / Concentration	Normal  • Estimated Intelligence	Average  • Recent Memory	Normal  • Remote Memory	Normal  • Fund of Knowledge	impaired  • Language	No abnormalities noted  • Judgment (regarding everyday events)	poor  • Insight (regarding psychiatric illness)	  poor    SUICIDALITY:   • Suicidality (Interval)	SI s/p suicide attempt by OD, 3rd attempt  HOMICIDALITY/AGGRESSION:   • Homicidality/Aggression	none known    DIAGNOSIS DSM-V:    Psychiatric Diagnosis (Corresponds to DSM-IV Axis I, II):   HEALTH ISSUES - PROBLEM Dx:  Depressive disorder    Suicide attempt by drug ingestion, subsequent encounter             Medical Diagnosis (Corresponds to DSM-IV Axis III):  • Axis III	    COPD  ASSESSMENT OF CURRENT CONDITION:   Summary (include case differential, formulation and patient response to therapy):   Pt is a danger to self following intentional OD and intended suicide attempt.  Pt will be involuntary psych admission once medically cleared.  (CT pending).  Risk Assessment (consider static vs modifiable risk factors and protective factors; comment on level of risk for dangerous behavior):   3rd suicide attempt by OD  PLAN  2PC admission

## 2021-09-05 NOTE — BEHAVIORAL HEALTH ASSESSMENT NOTE - NSBHCHARTREVIEWINVESTIGATE_PSY_A_CORE FT
< from: 12 Lead ECG (07.22.21 @ 14:31) >      Ventricular Rate 71 BPM    Atrial Rate 71 BPM    P-R Interval 140 ms    QRS Duration 82 ms    Q-T Interval 444 ms    QTC Calculation(Bazett) 482 ms    P Axis 59 degrees    R Axis 52 degrees    T Axis 47 degrees    Diagnosis Line Normal sinus rhythm  Prolonged QT  Abnormal ECG    Confirmed by XOCHITL JC ([id number]) on 7/22/2021 3:38:53 PM  Also confirmed by XOCHITL JC ([id number]),  DOM BROWNING ([id number])  on 8/10/2021 10:33:32 AM    < end of copied text >

## 2021-09-05 NOTE — ED BEHAVIORAL HEALTH NOTE - BEHAVIORAL HEALTH NOTE
Social work note: Pt is in need of involuntary admission. Pending CT scan at this time. Worker called [facility], no adult beds. Worker called [facility], no beds - covid outbreak as well. Worker also called [facility], no adult beds. [facility] has a bed available - worker will fax clinicals at this time for review. SW to follow.

## 2021-09-06 VITALS — OXYGEN SATURATION: 100 % | WEIGHT: 207.01 LBS | HEIGHT: 67 IN | RESPIRATION RATE: 14 BRPM | TEMPERATURE: 98 F

## 2021-09-06 DIAGNOSIS — M19.90 UNSPECIFIED OSTEOARTHRITIS, UNSPECIFIED SITE: ICD-10-CM

## 2021-09-06 DIAGNOSIS — F32.2 MAJOR DEPRESSIVE DISORDER, SINGLE EPISODE, SEVERE WITHOUT PSYCHOTIC FEATURES: ICD-10-CM

## 2021-09-06 DIAGNOSIS — R45.851 SUICIDAL IDEATIONS: ICD-10-CM

## 2021-09-06 DIAGNOSIS — F32.9 MAJOR DEPRESSIVE DISORDER, SINGLE EPISODE, UNSPECIFIED: ICD-10-CM

## 2021-09-06 DIAGNOSIS — J44.9 CHRONIC OBSTRUCTIVE PULMONARY DISEASE, UNSPECIFIED: ICD-10-CM

## 2021-09-06 LAB
A1C WITH ESTIMATED AVERAGE GLUCOSE RESULT: 5.6 % — SIGNIFICANT CHANGE UP (ref 4–5.6)
CHOLEST SERPL-MCNC: 181 MG/DL — SIGNIFICANT CHANGE UP
ESTIMATED AVERAGE GLUCOSE: 114 MG/DL — SIGNIFICANT CHANGE UP (ref 68–114)
HDLC SERPL-MCNC: 59 MG/DL — SIGNIFICANT CHANGE UP
LIPID PNL WITH DIRECT LDL SERPL: 106 MG/DL — HIGH
NON HDL CHOLESTEROL: 122 MG/DL — SIGNIFICANT CHANGE UP
TRIGL SERPL-MCNC: 78 MG/DL — SIGNIFICANT CHANGE UP
TSH SERPL-MCNC: 2.61 UU/ML — SIGNIFICANT CHANGE UP (ref 0.34–4.82)

## 2021-09-06 PROCEDURE — 99223 1ST HOSP IP/OBS HIGH 75: CPT

## 2021-09-06 PROCEDURE — 99221 1ST HOSP IP/OBS SF/LOW 40: CPT | Mod: GC

## 2021-09-06 NOTE — CONSULT NOTE ADULT - ATTENDING COMMENTS
60 year old female with narrative as previously stated      -Admitted to [facility]        # Suicide attempt  -management per psych    # Depression  -management per psych    # COPD  -stable  -albuterol prn    # Arthritis  -pain control as needed    # DVT ppx  -encourage ambulation

## 2021-09-06 NOTE — CONSULT NOTE ADULT - ASSESSMENT
60 YOWF with Chronic Depression with multiple suicide attempts was admitted for suicide attempt after taking large doses of prescribed medications.  Her common -law  brought her to the hospital after she revealed to him that she had taken an overdose of pills. She did not appear to suffer any ill effects from the  pills and now regrets having  taken them. She is alert oriented and remorseful but appears optimistic and is eager to go home.  She denies and hallucinosis or suicidal or homicidal ideation.  Physical Exam is remarkable for  mildly elevated systolic blood pressure which should be monitored.  She was also noted  to have arthritis of the left knee which should be treated with acetaminophen prn and physical therapy as well as weight loss.   She has wheezing in her lungs consistent with previously diagnosed COPD due to long term smoking, She is aware of this and is committed to abstaining, but will require Behavioral Services support. Nicotine substitute patch was offered and she can use her bronchodilator PRN as prescribed.  There are no issues requiring active intervention at this time   60 YOWF with Chronic Depression with prior multiple suicide attempts was admitted for suicide attempt after taking large doses of prescribed medications.  Her common -law  brought her to the hospital after she revealed to him that she had taken an overdose of pills. She did not appear to suffer any ill effects from the pills and now regrets having  taken them. She is alert, oriented and remorseful, but appears optimistic and is eager to go home.  She denies any hallucinosis or suicidal or homicidal ideation.  Physical Exam is remarkable for mildly elevated systolic blood pressure which should be monitored.  She was also noted  to have arthritis of the left knee which will be treated with acetaminophen prn and physical therapy as well as weight loss.   She has wheezing in her lungs consistent with previously diagnosed COPD due to long term smoking, She had been made aware of this in the past and is committed to abstaining, but will require Behavioral Services support.  Nicotine substitute patch was offered and she can use her bronchodilator PRN as prescribed.  There are no issues requiring active intervention at this time

## 2021-09-06 NOTE — CONSULT NOTE ADULT - SUBJECTIVE AND OBJECTIVE BOX
60 YOWF with history of chronic depression and PTSD brought to the ED by the spouse after she revealed she had taken 20 pills of Olanzipine and 20 pills of minipress in an attempt to commit suicide.She has a history of multiple admissions for depression and at least three suicide attempts by taking  a large amoubnt of prescribed medications  60 YOWF with history of chronic depression and PTSD brought to the ED by the spouse after she revealed she had taken 20 pills of Olanzipine and 20 pills of Minipress in an attempt to commit suicide. She has a history of multiple admissions for depression and at least three suicide attempts by taking  a large amount of prescribed medications in the past . Last admission was 2021 after attempted OD with Trazadone. She denies any hallucinosis or precipitating cause for her suicide attempt. She currently regrets her actions and is looking forward to going home.    PMHx   None     PSHx    None     Family Hx  Mother   2 years ago  brain tumor                   Father     Heart disease                   1 Brother  suicide                  2 sisters Alive and well    Social HX   Stopped smoking 1 week ago after 45 pack years                   Occasional social drinker                    No Drug abuse                    Lives with common -law  of 20 years     Allergies   Advil  developed swelling of face and left ear     ROS      Head      No recent trauma or headaches   Eyes       No pain , blurry vison or visual field defects  Ears       No pain , discharge or decrease in auditory acuity  Nose     No anosmia, bleeding or recent trauma  Throat   No pain, hoarseness or difficulty swallowing  Resp.     c/o occasional wheezing relieved by inhaler ( medicated ). No cough or chest pain   CVS       No chest pain, PND, orthopnea or claudication. Not physically active.  GI          Occasional constipation . No melena, hematochezia or weight loss           No polyuria, dysuria, nocturia or hematouria No supra-pubic or flank pain   Gyn        LMP >15 years Para 1000 , no vaginal bleeding or discharge     60 YOWF with history of chronic depression and PTSD brought to the ED by the spouse after she revealed she had taken 20 pills of Olanzipine and 20 pills of Minipress in an attempt to commit suicide. She has a history of multiple admissions for depression and at least three suicide attempts by taking  a large amount of prescribed medications in the past . Last admission was 2021 after attempted OD with Trazadone. She denies any hallucinosis or precipitating cause for her suicide attempt. She currently regrets her actions and is looking forward to going home.    PMHx   None     PSHx    None     Family Hx  Mother   2 years ago  brain tumor                   Father     Heart disease                   1 Brother  suicide                  2 sisters Alive and well    Social HX   Stopped smoking 1 week ago after 45 pack years                   Occasional social drinker                    No Drug abuse                    Lives with common -law  of 20 years     Allergies   Advil  developed swelling of face and left ear     ROS      Head      No recent trauma or headaches   Eyes       No pain , blurry vison or visual field defects  Ears       No pain , discharge or decrease in auditory acuity  Nose     No anosmia, bleeding or recent trauma  Throat   No pain, hoarseness or difficulty swallowing  Resp.     c/o occasional wheezing relieved by inhaler ( medicated ). No cough or chest pain   CVS       No chest pain, PND, orthopnea or claudication. Not physically active.  GI          Occasional constipation . No melena, hematochezia or weight loss           No polyuria, dysuria, nocturia or hematouria No supra-pubic or flank pain   Gyn        LMP >15 years Para 1000 , no vaginal bleeding or discharge   Musculoskeletal  Has left knee arthritis/arthralgia treated with physical therapy . No surgery contemplated.                           No pain, swelling or restricted ROM of  the other joints.    Neuro  No seizures, syncope or paraesthesias No vertigo.  Psych    see HPI      Vital Signs Last 24 Hrs  T(C): 36.7 (06 Sep 2021 08:17), Max: 36.8 (05 Sep 2021 21:30)  T(F): 98 (06 Sep 2021 08:17), Max: 98.3 (05 Sep 2021 21:30)  HR: 92 (05 Sep 2021 21:30) (92 - 92)  BP: 153/85 (05 Sep 2021 21:30) (153/85 - 153/85)  RR: 14 (06 Sep 2021 08:17) (14 - 18)  SpO2: 100% (06 Sep 2021 08:17) (95% - 100%)    PE   Skin      Good turgor, no open lesions, acyanotic .  Head    Normocephalic, no trauma.  Eyes     Corneas clear, no jaundice, PERRLA@5mm .  Ears     No pain or discharge. Normal acuity to conversational speech.  Nose    No discharge, patent, midline.    Throat  No erythema or exudates .   Neck    No masses, Thyroid was not palpable.  Lungs   Scattered expiratory wheezes that clear and shift with cough, Occasion rhonchi that clear and shift with cough.  Heart   No murmur or gallop. S1/S2 audible with normal  intensity.  Abdomen  Soft , obese, no tenderness, no palpable  masses or organomegaly. No bruits were audible.              Ext        Moderately fluctuant in the left infrapatellar space. No warmth  or redness.              No deformity ,swelling or redness of the other joints   Neuro  C.N.ll -Xll grossly intact . Good motor strength in all extremities             No sensory deficit. The pulse are 2 + bilaterally at the the DP and Femorals                                     60 YOWF with history of chronic depression and PTSD brought to the ED by the spouse after she revealed she had taken 20 pills of Olanzipine and 20 pills of Minipress in an attempt to commit suicide. She has a history of multiple admissions for depression and at least three suicide attempts by taking  a large amount of prescribed medications in the past . Last admission was 2021 after attempted OD with Trazadone. She denies any hallucinosis or precipitating cause for her suicide attempt. She currently regrets her actions and is looking forward to going home.    PMHx   None     PSHx    None     Family Hx  Mother   2 years ago  brain tumor                   Father     Heart disease                   1 Brother  suicide                  2 sisters Alive and well    Social HX   Stopped smoking 1 week ago after 45 pack years                   Occasional social drinker                    No Drug abuse                    Lives with common -law  of 20 years     Allergies   Advil  developed swelling of face and left ear     ROS      Head      No recent trauma or headaches   Eyes       No pain , blurry vison or visual field defects  Ears       No pain , discharge or decrease in auditory acuity  Nose     No anosmia, bleeding or recent trauma  Throat   No pain, hoarseness or difficulty swallowing  Resp.     c/o occasional wheezing relieved by inhaler ( medicated ). No cough or chest pain   CVS       No chest pain, PND, orthopnea or claudication. Not physically active.  GI          Occasional constipation . No melena, hematochezia or weight loss           No polyuria, dysuria, nocturia or hematouria No supra-pubic or flank pain   Gyn        LMP >15 years Para 1000 , no vaginal bleeding or discharge   Musculoskeletal  Has left knee arthritis/arthralgia treated with physical therapy . No surgery contemplated.                           No pain, swelling or restricted ROM of  the other joints.    Neuro  No seizures, syncope or paraesthesias No vertigo.  Psych    see HPI      Vital Signs Last 24 Hrs  T(C): 36.7 (06 Sep 2021 08:17), Max: 36.8 (05 Sep 2021 21:30)  T(F): 98 (06 Sep 2021 08:17), Max: 98.3 (05 Sep 2021 21:30)  HR: 92 (05 Sep 2021 21:30) (92 - 92)  BP: 153/85 (05 Sep 2021 21:30) (153/85 - 153/85)  RR: 14 (06 Sep 2021 08:17) (14 - 18)  SpO2: 100% (06 Sep 2021 08:17) (95% - 100%)    PE   Skin      Good turgor, no open lesions, acyanotic .  Head    Normocephalic, no trauma.  Eyes     Corneas clear, no jaundice, PERRLA@5mm .  Ears     No pain or discharge. Normal acuity to conversational speech.  Nose    No discharge, patent, midline.    Throat  No erythema or exudates .   Neck    No masses, Thyroid was not palpable.  Lungs   Scattered expiratory wheezes that clear and shift with cough, Occasion rhonchi that clear and shift with cough.  Heart   No murmur or gallop. S1/S2 audible with normal  intensity.  Abdomen  Soft , obese, no tenderness, no palpable  masses or organomegaly. No bruits were audible.              Ext        Moderately fluctuant in the left infrapatellar space. No warmth  or redness.              No deformity ,swelling or redness of the other joints   Neuro  C.N.ll -Xll grossly intact . Good motor strength in all extremities             No sensory deficit. The pulse are 2 + bilaterally at the the DP and Femorals        EKG RSR 85/min  [id number]  Covid  not detected     WBC 10.46  Hb 13.9 Hmct 41.4 Plts 229   Na139  K 4.8 Cl 102 Co2 27 Bun 13.2 Creat .7   TSH 1.31                                    60 YOWF with history of chronic depression and PTSD brought to the ED by the spouse after she revealed she had taken 20 pills of Olanzipine and 20 pills of Minipress in an attempt to commit suicide. She has a history of multiple admissions for depression and at least three suicide attempts by taking  a large amount of prescribed medications in the past . Last admission was 2021 after attempted OD with Trazadone. She denies any hallucinosis or precipitating cause for her suicide attempt. She currently regrets her actions and is looking forward to going home.    PMHx   None     PSHx    None     Family Hx  Mother   2 years ago  brain tumor                   Father     Heart disease                   1 Brother  suicide                  2 sisters Alive and well    Social HX   Stopped smoking 1 week ago after 45 pack years                   Occasional social drinker                    No Drug abuse                    Lives with common -law  of 20 years     Allergies   Advil  developed swelling of face and left ear     ROS      Head      No recent trauma or headaches   Eyes       No pain , blurry vison or visual field defects  Ears       No pain , discharge or decrease in auditory acuity  Nose     No anosmia, bleeding or recent trauma  Throat   No pain, hoarseness or difficulty swallowing  Resp.     c/o occasional wheezing relieved by inhaler ( medicated ). No cough or chest pain   CVS       No chest pain, PND, orthopnea or claudication. Not physically active.  GI          Occasional constipation . No melena, hematochezia or weight loss           No polyuria, dysuria, nocturia or hematouria No supra-pubic or flank pain   Gyn        LMP >15 years Para 1000 , no vaginal bleeding or discharge   Musculoskeletal  Has left knee arthritis/arthralgia treated with physical therapy . No surgery contemplated.                           No pain, swelling or restricted ROM of  the other joints.    Neuro  No seizures, syncope or paraesthesias No vertigo.  Psych    see HPI      Vital Signs Last 24 Hrs  T(C): 36.7 (06 Sep 2021 08:17), Max: 36.8 (05 Sep 2021 21:30)  T(F): 98 (06 Sep 2021 08:17), Max: 98.3 (05 Sep 2021 21:30)  HR: 92 (05 Sep 2021 21:30) (92 - 92)  BP: 153/85 (05 Sep 2021 21:30) (153/85 - 153/85)  RR: 14 (06 Sep 2021 08:17) (14 - 18)  SpO2: 100% (06 Sep 2021 08:17) (95% - 100%)    PE   Skin      Good turgor, no open lesions, acyanotic .  Head    Normocephalic, no trauma.  Eyes     Corneas clear, no jaundice, PERRLA@5mm .  Ears     No pain or discharge. Normal acuity to conversational speech.  Nose    No discharge, patent, midline.    Throat  No erythema or exudates .   Neck    No masses, Thyroid was not palpable.  Lungs   Scattered expiratory wheezes that clear and shift with cough, Occasion rhonchi that clear and shift with cough.  Heart   No murmur or gallop. S1/S2 audible with normal  intensity.  Abdomen  Soft , obese, no tenderness, no palpable  masses or organomegaly. No bruits were audible.              Ext        Moderately fluctuant in the left infrapatellar space. No warmth  or redness.              No deformity ,swelling or redness of the other joints   Neuro  C.N.ll -Xll grossly intact . Good motor strength in all extremities             No sensory deficit. The pulse are 2 + bilaterally at the the DP and Femorals        EKG RSR 85/min  [id number]  Covid  not detected     WBC 10.46  Hb 13.9 Hmct 41.4 Plts 229   Na139  K 4.8 Cl 102 Co2 27 Bun 13.2 Creat .7   TSH 1.31     UA Negative   Urine Tox Screen negative

## 2021-09-06 NOTE — PROGRESS NOTE BEHAVIORAL HEALTH - NSBHADDHXPSYCHFT_PSY_A_CORE
multiple inpatient psych admissions,   This is thet hird suicide attempt,  OD, most recently  in July 2021 suicide attempt by OD Trazodone claiming  at that tome  of boy friends health conditions and financial stressors. and currently following with [facility],

## 2021-09-06 NOTE — BH SAFETY PLAN - THE ONE THING THAT IS MOST IMPORTANT TO ME AND WORTH LIVING FOR IS:
Going to [employer]. Getting dressed up with my hair and make up done and going out somewhere nice

## 2021-09-06 NOTE — PROGRESS NOTE BEHAVIORAL HEALTH - NSBHFUPIPCHARTREVFT_PSY_A_CORE
60 yr old single  female with hx of recurrent depression , PTSD OD of her prescribed meds.she took 20 pills of 10mg olanzapine and 20 pills of 1mg  of the minipress.  Pt claiming that at the time of the OD of wanting to die.

## 2021-09-07 PROCEDURE — 99232 SBSQ HOSP IP/OBS MODERATE 35: CPT

## 2021-09-07 RX ORDER — SENNA PLUS 8.6 MG/1
2 TABLET ORAL AT BEDTIME
Refills: 0 | Status: DISCONTINUED | OUTPATIENT
Start: 2021-09-07 | End: 2021-09-10

## 2021-09-07 RX ORDER — FLUOXETINE HCL 10 MG
10 CAPSULE ORAL DAILY
Refills: 0 | Status: DISCONTINUED | OUTPATIENT
Start: 2021-09-07 | End: 2021-09-10

## 2021-09-07 RX ORDER — QUETIAPINE FUMARATE 200 MG/1
50 TABLET, FILM COATED ORAL AT BEDTIME
Refills: 0 | Status: DISCONTINUED | OUTPATIENT
Start: 2021-09-07 | End: 2021-09-10

## 2021-09-07 RX ORDER — TRAZODONE HCL 50 MG
50 TABLET ORAL AT BEDTIME
Refills: 0 | Status: DISCONTINUED | OUTPATIENT
Start: 2021-09-07 | End: 2021-09-10

## 2021-09-07 RX ADMIN — QUETIAPINE FUMARATE 50 MILLIGRAM(S): 200 TABLET, FILM COATED ORAL at 20:53

## 2021-09-07 RX ADMIN — SENNA PLUS 2 TABLET(S): 8.6 TABLET ORAL at 20:53

## 2021-09-07 RX ADMIN — Medication 3 MILLIGRAM(S): at 20:53

## 2021-09-08 PROCEDURE — 99233 SBSQ HOSP IP/OBS HIGH 50: CPT

## 2021-09-08 RX ADMIN — QUETIAPINE FUMARATE 50 MILLIGRAM(S): 200 TABLET, FILM COATED ORAL at 20:42

## 2021-09-08 RX ADMIN — SENNA PLUS 2 TABLET(S): 8.6 TABLET ORAL at 20:42

## 2021-09-08 RX ADMIN — Medication 10 MILLIGRAM(S): at 09:27

## 2021-09-08 RX ADMIN — Medication 3 MILLIGRAM(S): at 20:39

## 2021-09-08 NOTE — PROGRESS NOTE BEHAVIORAL HEALTH - NSBHADMITCOUNSEL_PSY_A_CORE
diagnostic results/impressions and/or recommended studies

## 2021-09-09 PROCEDURE — 99232 SBSQ HOSP IP/OBS MODERATE 35: CPT

## 2021-09-09 RX ADMIN — Medication 10 MILLIGRAM(S): at 09:13

## 2021-09-09 RX ADMIN — Medication 3 MILLIGRAM(S): at 20:59

## 2021-09-09 RX ADMIN — SENNA PLUS 2 TABLET(S): 8.6 TABLET ORAL at 20:59

## 2021-09-09 RX ADMIN — QUETIAPINE FUMARATE 50 MILLIGRAM(S): 200 TABLET, FILM COATED ORAL at 20:59

## 2021-09-09 NOTE — DISCHARGE NOTE BEHAVIORAL HEALTH - NSBHDCNOCAREGVRFT_PSY_A_CORE
Pt. declined to elect one. However, with pt.'s consent, pt.'s significant other Castro Regalado (ph. [phone]) was involved and made aware of discharge plans.

## 2021-09-09 NOTE — DISCHARGE NOTE BEHAVIORAL HEALTH - NSBHDCSUICSAFETYFT_PSY_A_CORE
Advised to return to hospital or go to nearest ED or call 911 or [phone] or [phone] TALK hotlines for any severe, worsening or persistent symptoms including suicidal/homicidal ideations, intent or plans. Patient verbalized understanding of instructions.

## 2021-09-09 NOTE — DISCHARGE NOTE BEHAVIORAL HEALTH - NSBHDCSUICFCTROTHERFT_PSY_A_CORE
1. increased stress from needing to care for boyfriend and continued future loss of his support as he is with increasing medical problems

## 2021-09-09 NOTE — DISCHARGE NOTE BEHAVIORAL HEALTH - HPI (INCLUDE ILLNESS QUALITY, SEVERITY, DURATION, TIMING, CONTEXT, MODIFYING FACTORS, ASSOCIATED SIGNS AND SYMPTOMS)
Patient is a 60 year old female who is currently on SSD, living with her boyfriend of 20 years, with a self reported history of Depression and PTSD, with a history of multiple inpatient psych admissions, last July, 2021,  2 prior suicide attempt by OD, and currently following with [facility], who was BIBA EMS activated by BF s/p intentional OD of her prescribed meds.  Pt reportedly took 20, 10 mg Olanzapine and 20,  1mg Minipress in an attempt to end her life.  Pt observed in main ED and now in BH area where she is disoriented and confused asking for her clothes from the dryer.  Pt paranoid and thinks people are stealing her belongings.  Pt ambulating to bathroom to give urine, urinate without giving specimen.  Pt needs redirection to her room.  Pt admitted to taking an OD, claiming she has nothing to live for.  Asked about her  and states she has a "fake " as they are not .  Pt states she used to want to get  but no longer.  When asked about the OD stated she did not want to talk about in and c/o not feeling well holding her abdomen but denies nausea.  Collaterals obtained from SO, Castro who reports they both woke up at the same time, he showered and asked Pt to make him toast.  He left the home for a short while and when he returned he learned she took an OD of her prescribed meds and he activated 911.  Castro reported she has been depressed and gets tx at [facility] and feels she did not get adequate therapy there.  Pt Prozac has been 40 mg since last admission in July, Olanzapine 10 mg qd, Minipress for "sleep" and no longer takes Trazodone due to last suicide attempt by OD Trazodone.  Castro denies Pt uses drugs or alcohol.  Pt will require psych admission when medically cleared as she is presenting with delirium from OD.  Will reeval in am when more alert and pre-transfer requirements.

## 2021-09-09 NOTE — DISCHARGE NOTE BEHAVIORAL HEALTH - NSBHDCCRISISPLAN1FT_PSY_A_CORE
Tell a trusted friend/family member, tell clinic staff, call a crisis line ( Crisis Center ph. [phone], Critical access hospital DASH [phone], [facility] (peer support) ph. [phone]), return to the nearest emergency room. You may call 9-1-1 for assistance.

## 2021-09-09 NOTE — DISCHARGE NOTE BEHAVIORAL HEALTH - NSBHDCADDFT_PSY_A_CORE
HgA1c A1C with Estimated Average Glucose Result: 5.6: Method: Immunoassay       Reference Range                4.0-5.6%       High risk (prediabetic)        5.7-6.4%       Diabetic, diagnostic             >=6.5%       ADA diabetic treatment goal       <7.0%  The Hemoglobin A1c testing is NGSP-certified.Reference ranges are based  upon the 2010 recommendations of  the American Diabetes Association.  Interpretation may vary for children  and adolescents. % (09.06.21 @ 08:53)  09-06 Chol 181 LDL -- HDL 59 Trig 78

## 2021-09-09 NOTE — DISCHARGE NOTE BEHAVIORAL HEALTH - NSBHDCREFEROTHERFT_PSY_A_CORE
Please see discharge packet for resources on therapeutic groups you may join in addition to your current treatment at [facility]  [facility] Groups  [facility]  Call: [phone]  [email or web address]    [facility] (*has virtual groups and for family members also)  [phone] 309-[facility] ([phone])  [email or web address]      * You are recommended to consider couples counseling to support conflict within relationship. Please discuss with your outpatient therapist or reach out for referrals as needed.

## 2021-09-09 NOTE — DISCHARGE NOTE BEHAVIORAL HEALTH - NSBHDCCRISISPLAN2FT_PSY_A_CORE
Call [facility] 5N: [phone]  PATRICIA- Jenny Funes [facility]  Psychiatrist- Dr. Vania Pittman DO

## 2021-09-09 NOTE — DISCHARGE NOTE BEHAVIORAL HEALTH - NSBHDCSUBSTHXFT_PSY_A_CORE
Pt. denies past/present abuse/dependence or tx. hx., endorsing only occasional ETOH use of a beer. 11-May-2020 10:00 11-May-2020 09:30

## 2021-09-09 NOTE — DISCHARGE NOTE BEHAVIORAL HEALTH - NSBHDCSUICFCTRSFT_PSY_A_CORE
1. depression - pt currently on prozac and seroquel augmentation. (***) pt Pt is directed to continue with all medications until directed by her MD to change or discontinued with medications  2. suicidal ideation - pt now denies any suicidal ideation intent or plan   3. PTSD - continue with psychotherapy for insight and coping skills.

## 2021-09-09 NOTE — DISCHARGE NOTE BEHAVIORAL HEALTH - NSBHDCSWCOMMENTSFT_PSY_A_CORE
Pt. was educated on the importance of taking medications as prescribed and to not stop or miss any doses unless directed by prescribing provider. Pt. was counseled on the importance of attending outpatient appointments for ongoing development of coping skills in particular. Pt. was made aware of crisis resources to use after hours as needed including returning to the hospital. Pt. was educated regarding safety precautions for COVID-19 including hand hygiene, wearing a mask, and maintaining social distancing and advised to seek medical attention if experiencing any symptoms. Pt. provided written handout of COVID-19 symptoms and management in discharge packet.

## 2021-09-09 NOTE — DISCHARGE NOTE BEHAVIORAL HEALTH - PAST PSYCHIATRIC HISTORY
multiple inpatient psych admissions, By patient's own admission she claims this is second or third  suicide attempt. OD, most recently  in July 2021 suicide attempt by OD Trazodone claiming  at that time  of boy friend's health conditions and financial stressors. and currently following with [facility],    Please see ***
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
## 2021-09-09 NOTE — DISCHARGE NOTE BEHAVIORAL HEALTH - NSBHDCADDR1FT_A_CORE
[address] [place] [id number]    IN-PERSON follow up appt. w/ therapist Pat scheduled for [address] [place] [id number]    IN-PERSON follow up appt. w/ therapist Dr. Tatyana Gunderson scheduled for Tuesday, 9/14 at 11am. You should scheduled a follow up appointment w/ your psych NP after attending this follow up appointment.

## 2021-09-09 NOTE — DISCHARGE NOTE BEHAVIORAL HEALTH - CONDITIONS AT DISCHARGE
more organized, less psychotic. denies suicidal ideation or voices. discharged with all belongigs and verbalized understanding of all d/c plans more organized, less psychotic. denies suicidal ideation or voices. discharged with all belongings and verbalized understanding of all d/c plans.  Patient alert, oriented x3, pleasant and cooperative.  Pt denies S/H IIP currently.  Nurse and  reviewed discharge plan with patient who agrees and accepts plan.  Pt provided with a copy of discharge paperwork.  Pt appropriately dressed for discharge and is transported home by family , friend or taxi to ensure safe arrival home.

## 2021-09-09 NOTE — DISCHARGE NOTE BEHAVIORAL HEALTH - NSBHDCCONDITIONFT_PSY_A_CORE
*** Pt had very fast and robust response to prozac at 10mg . Need for consideration of the pt really with bipolar Depression  and that she may do better possibly with seroquel at 300mg -500mg?   or lithium?  rather than with current medication.  Pt did not give history of nelli or clear manic symptoms.  Would suggest to keep in mind the above information to wonder if pt provides additional information whether the dxn should be changed

## 2021-09-09 NOTE — DISCHARGE NOTE BEHAVIORAL HEALTH - NSBHDCRESOURCESOTHERFT_PSY_A_CORE
[facility]- can provide case management services through Medicare to support Fredy/ you  Address: [address]  [address], [place] [address]  Phone: [phone]  Health Home    [facility]- care coordination services through Medicaid- can help link Fredy to services.  Main Ph. [phone]. Call  [facility] ph. [phone] as needed.    [facility]- home care services that may support Fredy  [phone]  [address] [place] [address], [address] [place] [id number]

## 2021-09-10 VITALS — TEMPERATURE: 98 F | RESPIRATION RATE: 14 BRPM | OXYGEN SATURATION: 100 %

## 2021-09-10 DIAGNOSIS — F31.9 BIPOLAR DISORDER, UNSPECIFIED: ICD-10-CM

## 2021-09-10 PROCEDURE — 99239 HOSP IP/OBS DSCHRG MGMT >30: CPT

## 2021-09-10 RX ORDER — QUETIAPINE FUMARATE 200 MG/1
1 TABLET, FILM COATED ORAL
Qty: 14 | Refills: 1
Start: 2021-09-10 | End: 2021-10-07

## 2021-09-10 RX ORDER — SENNA PLUS 8.6 MG/1
2 TABLET ORAL
Qty: 28 | Refills: 1
Start: 2021-09-10 | End: 2021-10-07

## 2021-09-10 RX ORDER — LANOLIN ALCOHOL/MO/W.PET/CERES
1 CREAM (GRAM) TOPICAL
Qty: 14 | Refills: 1
Start: 2021-09-10 | End: 2021-10-07

## 2021-09-10 RX ORDER — FLUOXETINE HCL 10 MG
1 CAPSULE ORAL
Qty: 14 | Refills: 1
Start: 2021-09-10 | End: 2021-10-07

## 2021-09-10 RX ADMIN — Medication 10 MILLIGRAM(S): at 09:06

## 2021-09-10 NOTE — PROGRESS NOTE BEHAVIORAL HEALTH - SECONDARY DX1
Suicide attempt by drug ingestion, subsequent encounter

## 2021-09-10 NOTE — PROGRESS NOTE BEHAVIORAL HEALTH - GAIT / STATION
Abnormal gait / station

## 2021-09-10 NOTE — PROGRESS NOTE BEHAVIORAL HEALTH - RISK ASSESSMENT
low risk   acute depression , anxious, improved  sleep, decreased appetite , still delirious?, impulsive   protective: social support   mitigating : willing to continue with treatment , denies any substance use   chronic : hx of this being the third OD SA
low risk   acute depression , anxious, improved  sleep, decreased appetite , still delirious?, impulsive   protective: social support   mitigating : willing to continue with treatment , denies any substance use   chronic : hx of this being the third OD SA
low risk   acute euthymic mood , improved  sleep, improved appetite , increased goal directed speech.   protective: social support   mitigating : willing to continue with treatment , denies any substance use   chronic : hx of this being the third OD SA
low risk   acute depression , anxious, improved  sleep, decreased appetite , still delirious?, impulsive   protective: social support   mitigating : willing to continue with treatment , denies any substance use   chronic : hx of this being the third OD SA
moderate risk   acute depression , anxious, poor sleep, decreased appetite , still delirious?, impulsive   protective: social support   mitigating : willing to continue with treatment , denies any substance use   chronic : hx of this being the third OD SA

## 2021-09-10 NOTE — PROGRESS NOTE BEHAVIORAL HEALTH - PROBLEM SELECTOR PLAN 2
encourage to attend groups

## 2021-09-10 NOTE — PROGRESS NOTE BEHAVIORAL HEALTH - NSBHFUPINTERVALCCFT_PSY_A_CORE
"I really feel much better , slept well."
"I did an impulsive thing and I only wanted to get some sleep.
"I feel ready to go home."
"I did an impulsive thing and I only wanted to get some sleep.
"I really feel much better , slept well."

## 2021-09-10 NOTE — PROGRESS NOTE BEHAVIORAL HEALTH - NSBHADMITIPOBS_PSY_A_CORE
Routine observation
Yes
Routine observation

## 2021-09-10 NOTE — PROGRESS NOTE BEHAVIORAL HEALTH - SUMMARY
Patient is a 60 year old female who is currently on SSD, living with her boyfriend of 20 years, with a self reported history of Depression and PTSD, with a history of multiple inpatient psych admissions, last July, 2021,  2 prior suicide attempt by OD, and currently following with [facility], who was BIBA EMS activated by BF s/p intentional OD of her prescribed meds.  Pt reportedly took 20, 10 mg Olanzapine and 20,  1mg Minipress in an attempt to end her life.  Pt observed in main ED and now in BH area where she is disoriented and confused asking for her clothes from the dryer.  Pt paranoid and thinks people are stealing her belongings.  Pt ambulating to bathroom to give urine, urinate without giving specimen.  Pt needs redirection to her room.  Pt admitted to taking an OD, claiming she has nothing to live for.  Asked about her  and states she has a "fake " as they are not .  Pt states she used to want to get  but no longer.  When asked about the OD stated she did not want o talk about in and c/o not feeling well holding her abdomen but denies nausea.  Collaterals obtained from SO, Castro who reports they both woke up at the same time, he showered and asked Pt to make him toast.  He left the home for a short while and when he returned he learned she took an OD of her prescribed meds and he activated 911.  Castro reported she has been depressed and gets tx at [facility] and feels she did not get adequate therapy there.  Pt Prozac has been 40 mg since last admission in July, Olanzapine 10 mg qd, Minipress for "sleep" and no longer takes Trazodone due to last suicide attempt by OD Trazodone.  Castro denies Pt uses drugs or alcohol.  Pt will require psych admission when medically cleared as she is presenting with delirium from OD.  Will reeval in am when more alert and pre-transfer requirements.
Patient is a 60 year old female who is currently on SSD, living with her boyfriend of 20 years, with a self reported history of Depression and PTSD, with a history of multiple inpatient psych admissions, last July, 2021,  2 prior suicide attempt by OD, and currently following with [facility]PD, who was BIBA EMS activated by BF s/p intentional OD of her prescribed meds.  Pt reportedly took 20, 10 mg Olanzapine and 20,  1mg Minipress in an attempt to end her life.  Pt observed in main ED and now in BH area where she is disoriented and confused asking for her clothes from the dryer.  Pt paranoid and thinks people are stealing her belongings.  Pt ambulating to bathroom to give urine, urinate without giving specimen.  Pt needs redirection to her room.  Pt admitted to taking an OD, claiming she has nothing to live for.  Asked about her  and states she has a "fake " as they are not .  Pt states she used to want to get  but no longer.  When asked about the OD stated she did not want o talk about in and c/o not feeling well holding her abdomen but denies nausea.  Collaterals obtained from SO, Castro who reports they both woke up at the same time, he showered and asked Pt to make him toast.  He left the home for a short while and when he returned he learned she took an OD of her prescribed meds and he activated 911.  Castro reported she has been depressed and gets tx at [facility] and feels she did not get adequate therapy there.  Pt Prozac has been 40 mg since last admission in July, Olanzapine 10 mg qd, Minipress for "sleep" and no longer takes Trazodone due to last suicide attempt by OD Trazodone.  Castro denies Pt uses drugs or alcohol.  Pt will require psych admission when medically cleared as she is presenting with delirium from OD.  Will reeval in am when more alert and pre-transfer requirements.
Patient is a 60 year old female who is currently on SSD, living with her boyfriend of 20 years, with a self reported history of Depression and PTSD, with a history of multiple inpatient psych admissions, last July, 2021,  2 prior suicide attempt by OD, and currently following with [facility] OPD, who was BIBA EMS activated by BF s/p intentional OD of her prescribed meds.  Pt reportedly took 20, 10 mg Olanzapine and 20,  1mg Minipress in an attempt to end her life.  Pt observed in main ED and now in BH area where she is disoriented and confused asking for her clothes from the dryer.  Pt paranoid and thinks people are stealing her belongings.  Pt ambulating to bathroom to give urine, urinate without giving specimen.  Pt needs redirection to her room.  Pt admitted to taking an OD, claiming she has nothing to live for.  Asked about her  and states she has a "fake " as they are not .  Pt states she used to want to get  but no longer.  When asked about the OD stated she did not want o talk about in and c/o not feeling well holding her abdomen but denies nausea.  Collaterals obtained from SO, Castro who reports they both woke up at the same time, he showered and asked Pt to make him toast.  He left the home for a short while and when he returned he learned she took an OD of her prescribed meds and he activated 911.  Castro reported she has been depressed and gets tx at [facility] and feels she did not get adequate therapy there.  Pt Prozac has been 40 mg since last admission in July, Olanzapine 10 mg qd, Minipress for "sleep" and no longer takes Trazodone due to last suicide attempt by OD Trazodone.  Castro denies Pt uses drugs or alcohol.  Pt will require psych admission when medically cleared as she is presenting with delirium from OD.  Will reeval in am when more alert and pre-transfer requirements.
Patient is a 60 year old female who is currently on SSD, living with her boyfriend of 20 years, with a self reported history of Depression and PTSD, with a history of multiple inpatient psych admissions, last July, 2021,  2 prior suicide attempt by OD, and currently following with [facility] OPD, who was BIBA EMS activated by BF s/p intentional OD of her prescribed meds.  Pt reportedly took 20, 10 mg Olanzapine and 20,  1mg Minipress in an attempt to end her life.  Pt observed in main ED and now in BH area where she is disoriented and confused asking for her clothes from the dryer.  Pt paranoid and thinks people are stealing her belongings.  Pt ambulating to bathroom to give urine, urinate without giving specimen.  Pt needs redirection to her room.  Pt admitted to taking an OD, claiming she has nothing to live for.  Asked about her  and states she has a "fake " as they are not .  Pt states she used to want to get  but no longer.  When asked about the OD stated she did not want o talk about in and c/o not feeling well holding her abdomen but denies nausea.  Collaterals obtained from SO, Castro who reports they both woke up at the same time, he showered and asked Pt to make him toast.  He left the home for a short while and when he returned he learned she took an OD of her prescribed meds and he activated 911.  Castro reported she has been depressed and gets tx at [facility] and feels she did not get adequate therapy there.  Pt Prozac has been 40 mg since last admission in July, Olanzapine 10 mg qd, Minipress for "sleep" and no longer takes Trazodone due to last suicide attempt by OD Trazodone.  Castro denies Pt uses drugs or alcohol.  Pt will require psych admission when medically cleared as she is presenting with delirium from OD.  Will reeval in am when more alert and pre-transfer requirements.
Patient is a 60 year old female who is currently on SSD, living with her boyfriend of 20 years, with a self reported history of Depression and PTSD, with a history of multiple inpatient psych admissions, last July, 2021,  2 prior suicide attempt by OD, and currently following with [facility] OPD, who was BIBA EMS activated by BF s/p intentional OD of her prescribed meds.  Pt reportedly took 20, 10 mg Olanzapine and 20,  1mg Minipress in an attempt to end her life.  Pt observed in main ED and now in BH area where she is disoriented and confused asking for her clothes from the dryer.  Pt paranoid and thinks people are stealing her belongings.  Pt ambulating to bathroom to give urine, urinate without giving specimen.  Pt needs redirection to her room.  Pt admitted to taking an OD, claiming she has nothing to live for.  Asked about her  and states she has a "fake " as they are not .  Pt states she used to want to get  but no longer.  When asked about the OD stated she did not want o talk about in and c/o not feeling well holding her abdomen but denies nausea.  Collaterals obtained from SO, Castro who reports they both woke up at the same time, he showered and asked Pt to make him toast.  He left the home for a short while and when he returned he learned she took an OD of her prescribed meds and he activated 911.  Castro reported she has been depressed and gets tx at [facility] and feels she did not get adequate therapy there.  Pt Prozac has been 40 mg since last admission in July, Olanzapine 10 mg qd, Minipress for "sleep" and no longer takes Trazodone due to last suicide attempt by OD Trazodone.  Castro denies Pt uses drugs or alcohol.  Pt will require psych admission when medically cleared as she is presenting with delirium from OD.  Will reeval in am when more alert and pre-transfer requirements.

## 2021-09-10 NOTE — PROGRESS NOTE BEHAVIORAL HEALTH - PROBLEM SELECTOR PLAN 1
encourage to attend groups for insight

## 2021-09-10 NOTE — PROGRESS NOTE BEHAVIORAL HEALTH - NSBHADMITMEDEDUDETAILS_A_CORE FT
Discussed risks, benefits, s-e
Discussed risks, benefits, s-e
Discussed risks, benefits, of the seroquel
Discussed risks, benefits, s-e
Discussed risks, benefits, of the seroquel

## 2021-09-10 NOTE — PROGRESS NOTE BEHAVIORAL HEALTH - NSBHCHARTREVIEWVS_PSY_A_CORE FT
Vital Signs Last 24 Hrs  T(C): 36.6 (05 Sep 2021 14:58), Max: 36.8 (04 Sep 2021 17:34)  T(F): 97.8 (05 Sep 2021 14:58), Max: 98.3 (04 Sep 2021 17:34)  HR: 98 (05 Sep 2021 14:58) (86 - 98)  BP: 143/83 (05 Sep 2021 14:58) (116/76 - 181/101)  BP(mean): --  RR: 18 (05 Sep 2021 14:58) (18 - 20)  SpO2: 96% (05 Sep 2021 14:58) (96% - 97%)
Vital Signs Last 24 Hrs  T(C): 37.1 (09 Sep 2021 07:59), Max: 37.1 (09 Sep 2021 07:59)  T(F): 98.7 (09 Sep 2021 07:59), Max: 98.7 (09 Sep 2021 07:59)  HR: --  BP: --  BP(mean): --  RR: 14 (09 Sep 2021 07:59) (14 - 14)  SpO2: 96% (09 Sep 2021 07:59) (96% - 96%)
Vital Signs Last 24 Hrs  T(C): 36.5 (07 Sep 2021 08:10), Max: 36.5 (07 Sep 2021 08:10)  T(F): 97.7 (07 Sep 2021 08:10), Max: 97.7 (07 Sep 2021 08:10)  HR: --  BP: --  BP(mean): --  RR: 16 (07 Sep 2021 08:10) (16 - 16)  SpO2: 100% (07 Sep 2021 08:10) (100% - 100%)
Vital Signs Last 24 Hrs  T(C): 36.9 (10 Sep 2021 07:47), Max: 36.9 (10 Sep 2021 07:47)  T(F): 98.5 (10 Sep 2021 07:47), Max: 98.5 (10 Sep 2021 07:47)  HR: --  BP: --  BP(mean): --  RR: 14 (10 Sep 2021 07:47) (14 - 14)  SpO2: 100% (10 Sep 2021 07:47) (100% - 100%)
Vital Signs Last 24 Hrs  T(C): 36.6 (08 Sep 2021 07:35), Max: 36.6 (08 Sep 2021 07:35)  T(F): 97.9 (08 Sep 2021 07:35), Max: 97.9 (08 Sep 2021 07:35)  HR: --  BP: --  BP(mean): --  RR: 18 (08 Sep 2021 07:35) (18 - 18)  SpO2: 99% (08 Sep 2021 07:35) (99% - 99%)

## 2021-09-13 DIAGNOSIS — F34.1 DYSTHYMIC DISORDER: ICD-10-CM

## 2021-09-13 DIAGNOSIS — T43.592A POISONING BY OTHER ANTIPSYCHOTICS AND NEUROLEPTICS, INTENTIONAL SELF-HARM, INITIAL ENCOUNTER: ICD-10-CM

## 2021-09-13 DIAGNOSIS — E66.9 OBESITY, UNSPECIFIED: ICD-10-CM

## 2021-09-13 DIAGNOSIS — Z91.5 PERSONAL HISTORY OF SELF-HARM: ICD-10-CM

## 2021-09-13 DIAGNOSIS — J44.9 CHRONIC OBSTRUCTIVE PULMONARY DISEASE, UNSPECIFIED: ICD-10-CM

## 2021-09-13 DIAGNOSIS — F32.9 MAJOR DEPRESSIVE DISORDER, SINGLE EPISODE, UNSPECIFIED: ICD-10-CM

## 2022-03-15 NOTE — ED ADULT NURSE REASSESSMENT NOTE - COMFORT CARE
meal provided/plan of care explained
plan of care explained/wait time explained
ambulated to bathroom
ambulated to bathroom/meal provided/plan of care explained
darkened lights
Home

## 2022-04-04 NOTE — ED CDU PROVIDER INITIAL DAY NOTE - SHIFT CHANGE
[address] [facility]  Occupational Therapy Not Seen Note    DATE: 2022    NAME: Roxanne Guzman  MRN: [id number]   : 1946      Patient not seen this date for Occupational Therapy due to:    Declined: Pt declined to participate in OT evaluation today reporting frustration of too many people coming in / out of her room. However, Pt is agreeable to participate tomorrow (in OT eval).         Electronically signed by JOVANI Lee OTR/L on 2022 at 5:16 PM Yes...

## 2022-06-26 ENCOUNTER — EMERGENCY (EMERGENCY)
Facility: HOSPITAL | Age: 62
LOS: 1 days | Discharge: OTHER INSTITUTION | End: 2022-06-26
Attending: EMERGENCY MEDICINE
Payer: MEDICARE

## 2022-06-26 VITALS — WEIGHT: 175.05 LBS | HEIGHT: 67 IN

## 2022-06-26 DIAGNOSIS — F25.0 SCHIZOAFFECTIVE DISORDER, BIPOLAR TYPE: ICD-10-CM

## 2022-06-26 LAB
ALBUMIN SERPL ELPH-MCNC: 4.5 G/DL — SIGNIFICANT CHANGE UP (ref 3.3–5.2)
ALP SERPL-CCNC: 89 U/L — SIGNIFICANT CHANGE UP (ref 40–120)
ALT FLD-CCNC: 21 U/L — SIGNIFICANT CHANGE UP
ANION GAP SERPL CALC-SCNC: 10 MMOL/L — SIGNIFICANT CHANGE UP (ref 5–17)
APAP SERPL-MCNC: <3 UG/ML — LOW (ref 10–26)
AST SERPL-CCNC: 28 U/L — SIGNIFICANT CHANGE UP
BASOPHILS # BLD AUTO: 0.03 K/UL — SIGNIFICANT CHANGE UP (ref 0–0.2)
BASOPHILS NFR BLD AUTO: 0.4 % — SIGNIFICANT CHANGE UP (ref 0–2)
BILIRUB SERPL-MCNC: 0.4 MG/DL — SIGNIFICANT CHANGE UP (ref 0.4–2)
BUN SERPL-MCNC: 11.3 MG/DL — SIGNIFICANT CHANGE UP (ref 8–20)
CALCIUM SERPL-MCNC: 9.1 MG/DL — SIGNIFICANT CHANGE UP (ref 8.6–10.2)
CHLORIDE SERPL-SCNC: 102 MMOL/L — SIGNIFICANT CHANGE UP (ref 98–107)
CO2 SERPL-SCNC: 27 MMOL/L — SIGNIFICANT CHANGE UP (ref 22–29)
CREAT SERPL-MCNC: 0.72 MG/DL — SIGNIFICANT CHANGE UP (ref 0.5–1.3)
EGFR: 95 ML/MIN/1.73M2 — SIGNIFICANT CHANGE UP
EOSINOPHIL # BLD AUTO: 0.09 K/UL — SIGNIFICANT CHANGE UP (ref 0–0.5)
EOSINOPHIL NFR BLD AUTO: 1.1 % — SIGNIFICANT CHANGE UP (ref 0–6)
ETHANOL SERPL-MCNC: <10 MG/DL — SIGNIFICANT CHANGE UP (ref 0–9)
GLUCOSE SERPL-MCNC: 109 MG/DL — HIGH (ref 70–99)
HCG SERPL-ACNC: <4 MIU/ML — SIGNIFICANT CHANGE UP
HCT VFR BLD CALC: 45.9 % — HIGH (ref 34.5–45)
HGB BLD-MCNC: 15.2 G/DL — SIGNIFICANT CHANGE UP (ref 11.5–15.5)
IMM GRANULOCYTES NFR BLD AUTO: 0.5 % — SIGNIFICANT CHANGE UP (ref 0–1.5)
LYMPHOCYTES # BLD AUTO: 1.62 K/UL — SIGNIFICANT CHANGE UP (ref 1–3.3)
LYMPHOCYTES # BLD AUTO: 19 % — SIGNIFICANT CHANGE UP (ref 13–44)
MCHC RBC-ENTMCNC: 28.3 PG — SIGNIFICANT CHANGE UP (ref 27–34)
MCHC RBC-ENTMCNC: 33.1 GM/DL — SIGNIFICANT CHANGE UP (ref 32–36)
MCV RBC AUTO: 85.5 FL — SIGNIFICANT CHANGE UP (ref 80–100)
MONOCYTES # BLD AUTO: 0.63 K/UL — SIGNIFICANT CHANGE UP (ref 0–0.9)
MONOCYTES NFR BLD AUTO: 7.4 % — SIGNIFICANT CHANGE UP (ref 2–14)
NEUTROPHILS # BLD AUTO: 6.1 K/UL — SIGNIFICANT CHANGE UP (ref 1.8–7.4)
NEUTROPHILS NFR BLD AUTO: 71.6 % — SIGNIFICANT CHANGE UP (ref 43–77)
PLATELET # BLD AUTO: 245 K/UL — SIGNIFICANT CHANGE UP (ref 150–400)
POTASSIUM SERPL-MCNC: 4.3 MMOL/L — SIGNIFICANT CHANGE UP (ref 3.5–5.3)
POTASSIUM SERPL-SCNC: 4.3 MMOL/L — SIGNIFICANT CHANGE UP (ref 3.5–5.3)
PROT SERPL-MCNC: 7.6 G/DL — SIGNIFICANT CHANGE UP (ref 6.6–8.7)
RBC # BLD: 5.37 M/UL — HIGH (ref 3.8–5.2)
RBC # FLD: 12.7 % — SIGNIFICANT CHANGE UP (ref 10.3–14.5)
SALICYLATES SERPL-MCNC: <0.6 MG/DL — LOW (ref 10–20)
SARS-COV-2 RNA SPEC QL NAA+PROBE: SIGNIFICANT CHANGE UP
SODIUM SERPL-SCNC: 139 MMOL/L — SIGNIFICANT CHANGE UP (ref 135–145)
WBC # BLD: 8.51 K/UL — SIGNIFICANT CHANGE UP (ref 3.8–10.5)
WBC # FLD AUTO: 8.51 K/UL — SIGNIFICANT CHANGE UP (ref 3.8–10.5)

## 2022-06-26 PROCEDURE — 99218: CPT | Mod: FS

## 2022-06-26 PROCEDURE — 90792 PSYCH DIAG EVAL W/MED SRVCS: CPT

## 2022-06-26 PROCEDURE — 93010 ELECTROCARDIOGRAM REPORT: CPT

## 2022-06-26 RX ORDER — HALOPERIDOL DECANOATE 100 MG/ML
5 INJECTION INTRAMUSCULAR EVERY 6 HOURS
Refills: 0 | Status: DISCONTINUED | OUTPATIENT
Start: 2022-06-26 | End: 2022-06-30

## 2022-06-26 RX ORDER — DIPHENHYDRAMINE HCL 50 MG
50 CAPSULE ORAL EVERY 6 HOURS
Refills: 0 | Status: DISCONTINUED | OUTPATIENT
Start: 2022-06-26 | End: 2022-06-30

## 2022-06-26 RX ORDER — MIDAZOLAM HYDROCHLORIDE 1 MG/ML
10 INJECTION, SOLUTION INTRAMUSCULAR; INTRAVENOUS ONCE
Refills: 0 | Status: DISCONTINUED | OUTPATIENT
Start: 2022-06-26 | End: 2022-06-26

## 2022-06-26 RX ORDER — CLONAZEPAM 1 MG
0.5 TABLET ORAL
Refills: 0 | Status: COMPLETED | OUTPATIENT
Start: 2022-06-26 | End: 2022-07-03

## 2022-06-26 RX ADMIN — MIDAZOLAM HYDROCHLORIDE 10 MILLIGRAM(S): 1 INJECTION, SOLUTION INTRAMUSCULAR; INTRAVENOUS at 05:15

## 2022-06-26 RX ADMIN — Medication 50 MILLIGRAM(S): at 13:35

## 2022-06-26 RX ADMIN — HALOPERIDOL DECANOATE 5 MILLIGRAM(S): 100 INJECTION INTRAMUSCULAR at 13:35

## 2022-06-26 NOTE — ED PROVIDER NOTE - CPE EDP SKIN NORM
Subjective:       Patient ID: Hernan Engel is a 64 y.o. male.    Chief Complaint: No chief complaint on file.    64 yr old pleasant black male with right lung cancer now is remission, HLD, DM II,COPD,  thrombocytopenia, presents today for his regular check and lab work review. No complaints today..    Lung cancer right - had treatment and it is in remission - he followed [facility] all through this time - he is due for CT chest for surveillance - no complaints       DM II - controlled - diet control -A1C                   5.4                 10/05/2019                                 - not on ACE/ARB  Foot n eye exam UTD      HLD - diet control -  LDLCALC                  123.0               10/05/2019                        Depression/anxiety - controlled -- no SI/HI      Insomnia - uncontrolled - want to try something else since ambien not helping       COPD - controlled -       GERD - worried about H Pylori and want to check the status          History as below - reviewed       Diabetes   He presents for his follow-up diabetic visit. He has type 2 diabetes mellitus. His disease course has been stable. There are no hypoglycemic associated symptoms. Pertinent negatives for hypoglycemia include no dizziness, nervousness/anxiousness or speech difficulty. There are no diabetic associated symptoms. Pertinent negatives for diabetes include no chest pain, no polyuria and no weakness. There are no hypoglycemic complications. Symptoms are stable. There are no diabetic complications. Risk factors for coronary artery disease include diabetes mellitus and male sex. Current diabetic treatment includes diet. He is compliant with treatment all of the time. He is following a diabetic diet. Meal planning includes avoidance of concentrated sweets. He rarely participates in exercise. There is no change in his home blood glucose trend. An ACE inhibitor/angiotensin II receptor blocker is not being taken. He does not see a podiatrist.Eye  exam is not current.   Hyperlipidemia   Associated symptoms include myalgias. Pertinent negatives include no chest pain.     Review of Systems   Constitutional: Negative.  Negative for activity change, diaphoresis and unexpected weight change.   HENT: Negative.  Negative for congestion, ear pain, mouth sores, rhinorrhea and voice change.    Eyes: Negative.  Negative for pain, discharge and visual disturbance.   Respiratory: Negative.  Negative for apnea, cough and wheezing.    Cardiovascular: Negative.  Negative for chest pain and palpitations.   Gastrointestinal: Negative.  Negative for abdominal distention, anal bleeding, diarrhea and vomiting.   Endocrine: Negative.  Negative for cold intolerance and polyuria.   Genitourinary: Negative.  Negative for decreased urine volume, difficulty urinating, discharge, frequency and scrotal swelling.   Musculoskeletal: Positive for myalgias. Negative for back pain and neck stiffness.   Skin: Negative.  Negative for color change and rash.   Allergic/Immunologic: Negative.  Negative for environmental allergies and immunocompromised state.   Neurological: Negative.  Negative for dizziness, speech difficulty, weakness and light-headedness.   Hematological: Negative.    Psychiatric/Behavioral: Negative.  Negative for agitation, dysphoric mood and suicidal ideas. The patient is not nervous/anxious.        PMH/PSH/FH/SH/MED/ALLERGY reviewed    Objective:       Vitals:    01/07/20 1053   BP: 130/80   Pulse: 63       Physical Exam   Constitutional: He is oriented to person, place, and time. He appears well-developed and well-nourished.   HENT:   Head: Normocephalic and atraumatic.   Right Ear: External ear normal.   Left Ear: External ear normal.   Nose: Nose normal.   Mouth/Throat: Oropharynx is clear and moist. No oropharyngeal exudate.   Eyes: Pupils are equal, round, and reactive to light. Conjunctivae and EOM are normal. Right eye exhibits no discharge. Left eye exhibits no  discharge. No scleral icterus.   Neck: Normal range of motion. Neck supple. No JVD present. No tracheal deviation present. No thyromegaly present.   Cardiovascular: Normal rate, regular rhythm, normal heart sounds and intact distal pulses. Exam reveals no gallop and no friction rub.   No murmur heard.  Pulmonary/Chest: Effort normal and breath sounds normal. No stridor. No respiratory distress. He has no wheezes. He has no rales. He exhibits no tenderness.   Abdominal: Soft. Bowel sounds are normal. He exhibits no distension and no mass. There is no tenderness. There is no rebound and no guarding. No hernia.   Musculoskeletal: Normal range of motion. He exhibits no edema or tenderness.   Lymphadenopathy:     He has no cervical adenopathy.   Neurological: He is alert and oriented to person, place, and time. He has normal reflexes. He displays normal reflexes. No cranial nerve deficit. He exhibits normal muscle tone. Coordination normal.   Skin: Skin is warm and dry. No rash noted. No erythema. No pallor.   Psychiatric: He has a normal mood and affect. His behavior is normal. Judgment and thought content normal.       Assessment:       1. Type 2 diabetes mellitus without complication, with long-term current use of insulin    2. Primary adenocarcinoma of right lung    3. Dyslipidemia associated with type 2 diabetes mellitus    4. Aortic atherosclerosis    5. Panlobular emphysema    6. Anxiety    7. Type 2 diabetes mellitus without complication, without long-term current use of insulin    8. Screen for colon cancer        Plan:           Diagnoses and all orders for this visit:    Type 2 diabetes mellitus without complication, with long-term current use of insulin  -     Comprehensive metabolic panel; Future  -     Hemoglobin A1c; Future  -     Lipid panel; Future    Primary adenocarcinoma of right lung  -     X-Ray Chest PA And Lateral; Future    Dyslipidemia associated with type 2 diabetes mellitus    Aortic  atherosclerosis    Panlobular emphysema  -     albuterol (PROVENTIL/VENTOLIN HFA) 90 mcg/actuation inhaler; Inhale 2 puffs into the lungs every 6 (six) hours as needed for Wheezing.  -     fluticasone-salmeterol diskus inhaler 500-50 mcg; Inhale 1 puff into the lungs 2 (two) times daily.  -     X-Ray Chest PA And Lateral; Future    Anxiety    Type 2 diabetes mellitus without complication, without long-term current use of insulin  -     Comprehensive metabolic panel; Future  -     Hemoglobin A1c; Future  -     Lipid panel; Future    Screen for colon cancer  -     Case request GI: COLONOSCOPY      COPD  -stable  -refilled meds    GERD  -H Pylori    DM II  -controlled    HLD  -controlled  -continue statin    Insomnia  -controlled    Anxiety/depression  -controlled      Lung cancer remission  -cxr for surveillance normal    Spent adequate time in obtaining history and explaining differentials    25 minutes spent during this visit of which greater than 50% devoted to face-face counseling and coordination of care regarding diagnosis and management plan    Follow up in about 3 months (around 4/7/2020), or if symptoms worsen or fail to improve.       normal...

## 2022-06-26 NOTE — ED BEHAVIORAL HEALTH ASSESSMENT NOTE - OTHER PAST PSYCHIATRIC HISTORY (INCLUDE DETAILS REGARDING ONSET, COURSE OF ILLNESS, INPATIENT/OUTPATIENT TREATMENT)
History of Depression and PTSD and schizoaffective disorder as reported bt Castro her s/o, History of multiple past admissions and 2 prior suicide attempt by OD. Patient follows with [facility] Psych NP Nithya Marmolejo

## 2022-06-26 NOTE — ED ADULT NURSE REASSESSMENT NOTE - NS ED NURSE REASSESS COMMENT FT1
Arrived to  escorted by security and PCA. Alert, agitated. non cooperative, disrespectful to staff. Brought to unit  by security, patient refused to cooperate with interview assessment. MD made aware, patient refused vital signs. Ice pack requested by patient and given, snack offered, left at the patient bedside. Patient sitting in chair, nursing is monitoring. Safety maintained.
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
## 2022-06-26 NOTE — ED BEHAVIORAL HEALTH ASSESSMENT NOTE - DESCRIPTION
never , no children, lives with boyfriend of 25 years. On SSD for her mental illness Refused vitals.  Patient guarded, verbally aggressive not for coming with information, had mask over her face and covering her eyes. as per record COPD, pt denies

## 2022-06-26 NOTE — ED BEHAVIORAL HEALTH ASSESSMENT NOTE - RISK ASSESSMENT
PF: housing stability, has outpt provider, no current SA, supportive spouse  RF; Paranoid, past psych admission last 9/2021 for SA, past SA Low Acute Suicide Risk

## 2022-06-26 NOTE — ED PROVIDER NOTE - OBJECTIVE STATEMENT
61 year old female with pMh bipolar disorder presents with agitated behavior. As per EMS, the pt was acting aggressive and agitated at home and the police were called. Pt has not been taking her meds and has been acting increasingly erratic. She arrived with acute agitated delirium, threatening staff, aggressive and belligerent, and required chemical treatment of her agitation.
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
## 2022-06-26 NOTE — ED BEHAVIORAL HEALTH ASSESSMENT NOTE - NSTXRELFACTOR_PSY_ALL_CORE
S/O reported patient was going to start to see new therapist and appt was cancelled last week./Change in provider or treatment (i.e., medications, psychotherapy, milieu)

## 2022-06-26 NOTE — CHART NOTE - NSCHARTNOTEFT_GEN_A_CORE
Sw Note: SW Note: At 18:20, worker reached out to [facility] admissions ([place]al) [facility] ([place]) who report having no female adult beds available. [place] admissions called the unit to report that beds will be open tomorrow. SW to follow.

## 2022-06-26 NOTE — ED ADULT NURSE REASSESSMENT NOTE - NS ED NURSE REASSESS COMMENT FT1
received pt in room sitting on bed looking down.  spoke with pt pt didn't looked up no verbal response.  attempted to medicated with klonopin as ordered will retry

## 2022-06-26 NOTE — ED PROVIDER NOTE - PROGRESS NOTE DETAILS
AJM: pt seen by  who will admit involuntarily. no beds available at this time. will observe until bed availability
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
## 2022-06-26 NOTE — ED CDU PROVIDER INITIAL DAY NOTE - OBJECTIVE STATEMENT
61 year old female with pMh bipolar disorder presents with agitated behavior. As per EMS, the pt was acting aggressive and agitated at home and the police were called. Pt has not been taking her meds and has been acting increasingly erratic. She arrived with acute agitated delirium, threatening staff, aggressive and belligerent, and required chemical treatment of her agitation.    No medical issues. Seen by  who request admission. pending bed availability
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
## 2022-06-27 VITALS
RESPIRATION RATE: 18 BRPM | HEART RATE: 95 BPM | SYSTOLIC BLOOD PRESSURE: 142 MMHG | OXYGEN SATURATION: 97 % | TEMPERATURE: 98 F | DIASTOLIC BLOOD PRESSURE: 86 MMHG

## 2022-06-27 LAB
AMPHET UR-MCNC: NEGATIVE — SIGNIFICANT CHANGE UP
APPEARANCE UR: ABNORMAL
BACTERIA # UR AUTO: ABNORMAL
BARBITURATES UR SCN-MCNC: NEGATIVE — SIGNIFICANT CHANGE UP
BENZODIAZ UR-MCNC: POSITIVE
BILIRUB UR-MCNC: NEGATIVE — SIGNIFICANT CHANGE UP
COCAINE METAB.OTHER UR-MCNC: NEGATIVE — SIGNIFICANT CHANGE UP
COLOR SPEC: YELLOW — SIGNIFICANT CHANGE UP
DIFF PNL FLD: NEGATIVE — SIGNIFICANT CHANGE UP
EPI CELLS # UR: ABNORMAL
GLUCOSE UR QL: NEGATIVE MG/DL — SIGNIFICANT CHANGE UP
KETONES UR-MCNC: ABNORMAL
LEUKOCYTE ESTERASE UR-ACNC: ABNORMAL
METHADONE UR-MCNC: NEGATIVE — SIGNIFICANT CHANGE UP
NITRITE UR-MCNC: NEGATIVE — SIGNIFICANT CHANGE UP
OPIATES UR-MCNC: NEGATIVE — SIGNIFICANT CHANGE UP
PCP SPEC-MCNC: SIGNIFICANT CHANGE UP
PCP UR-MCNC: NEGATIVE — SIGNIFICANT CHANGE UP
PH UR: 6 — SIGNIFICANT CHANGE UP (ref 5–8)
PROT UR-MCNC: 30 MG/DL
RBC CASTS # UR COMP ASSIST: NEGATIVE /HPF — SIGNIFICANT CHANGE UP (ref 0–4)
SP GR SPEC: 1.01 — SIGNIFICANT CHANGE UP (ref 1.01–1.02)
THC UR QL: NEGATIVE — SIGNIFICANT CHANGE UP
UROBILINOGEN FLD QL: NEGATIVE MG/DL — SIGNIFICANT CHANGE UP
WBC UR QL: SIGNIFICANT CHANGE UP /HPF (ref 0–5)

## 2022-06-27 PROCEDURE — U0003: CPT

## 2022-06-27 PROCEDURE — G0378: CPT

## 2022-06-27 PROCEDURE — 80053 COMPREHEN METABOLIC PANEL: CPT

## 2022-06-27 PROCEDURE — 84702 CHORIONIC GONADOTROPIN TEST: CPT

## 2022-06-27 PROCEDURE — 81001 URINALYSIS AUTO W/SCOPE: CPT

## 2022-06-27 PROCEDURE — 93005 ELECTROCARDIOGRAM TRACING: CPT

## 2022-06-27 PROCEDURE — 36415 COLL VENOUS BLD VENIPUNCTURE: CPT

## 2022-06-27 PROCEDURE — 99284 EMERGENCY DEPT VISIT MOD MDM: CPT | Mod: 25

## 2022-06-27 PROCEDURE — U0005: CPT

## 2022-06-27 PROCEDURE — 96372 THER/PROPH/DIAG INJ SC/IM: CPT

## 2022-06-27 PROCEDURE — 80307 DRUG TEST PRSMV CHEM ANLYZR: CPT

## 2022-06-27 PROCEDURE — 85025 COMPLETE CBC W/AUTO DIFF WBC: CPT

## 2022-06-27 PROCEDURE — 99217: CPT

## 2022-06-27 RX ADMIN — Medication 0.5 MILLIGRAM(S): at 06:35

## 2022-06-27 NOTE — ED ADULT NURSE REASSESSMENT NOTE - NS ED NURSE REASSESS COMMENT FT1
pt received resting/sleeping in NAD at this time. pt compliant with vitals and provided breakfast which she consumed 100%. pt educated on the plan of care.

## 2022-06-27 NOTE — CHART NOTE - NSCHARTNOTEFT_GEN_A_CORE
SW Note: Pt will be transferred to [facility] for inpt psychiatric care. Accepting Dr. Gutierrez. Legals 9.37. Pt aware and used unit phone to call her S/O. [facility] arranged. [facility] sent with pt. Covid neg. ED provider aware of transfer. No auth needed for admit, Medicare listed

## 2022-06-27 NOTE — ED CDU PROVIDER DISPOSITION NOTE - CLINICAL COURSE
61 year old female with pMh bipolar disorder presents with agitated behavior. As per EMS, the pt was acting aggressive and agitated at home and the police were called. Pt has not been taking her meds and has been acting increasingly erratic. She arrived with acute agitated delirium, threatening staff, aggressive and belligerent, and required chemical treatment of her agitation.  patient medically cleared. patient seen by psych, transferred for inpatient treatment.

## 2022-08-23 NOTE — PATIENT PROFILE BEHAVIORAL HEALTH - NSDYSPHAGRISKASSESS_PSY_ALL_CORE
Show Spray Paint Technique Variable?: Yes Post-Care Instructions: I reviewed with the patient in detail post-care instructions. Patient is to wear sunprotection, and avoid picking at any of the treated lesions. Pt may apply Vaseline to crusted or scabbing areas. Detail Level: Simple Consent: The patient's consent was obtained including but not limited to risks of crusting, scabbing, blistering, scarring, darker or lighter pigmentary change, recurrence, incomplete removal and infection. Render Note In Bullet Format When Appropriate: No Duration Of Freeze Thaw-Cycle (Seconds): 5-10 Number Of Freeze-Thaw Cycles: 2 freeze-thaw cycles Medical Necessity Information: It is in your best interest to select a reason for this procedure from the list below. All of these items fulfill various CMS LCD requirements except the new and changing color options. Spray Paint Text: The liquid nitrogen was applied to the skin utilizing a spray paint frosting technique. Yes, patient not at risk

## 2022-09-16 ENCOUNTER — EMERGENCY (EMERGENCY)
Facility: HOSPITAL | Age: 62
LOS: 1 days | Discharge: HOME | End: 2022-09-16
Attending: EMERGENCY MEDICINE
Payer: MEDICARE

## 2022-09-16 VITALS
DIASTOLIC BLOOD PRESSURE: 98 MMHG | HEART RATE: 101 BPM | SYSTOLIC BLOOD PRESSURE: 158 MMHG | TEMPERATURE: 99 F | RESPIRATION RATE: 20 BRPM | OXYGEN SATURATION: 95 % | WEIGHT: 169.98 LBS

## 2022-09-16 DIAGNOSIS — F29 UNSPECIFIED PSYCHOSIS NOT DUE TO A SUBSTANCE OR KNOWN PHYSIOLOGICAL CONDITION: ICD-10-CM

## 2022-09-16 LAB
ANION GAP SERPL CALC-SCNC: 12 MMOL/L — SIGNIFICANT CHANGE UP (ref 5–17)
APAP SERPL-MCNC: <3 UG/ML — LOW (ref 10–26)
APPEARANCE UR: ABNORMAL
BASOPHILS # BLD AUTO: 0.04 K/UL — SIGNIFICANT CHANGE UP (ref 0–0.2)
BASOPHILS NFR BLD AUTO: 0.4 % — SIGNIFICANT CHANGE UP (ref 0–2)
BILIRUB UR-MCNC: NEGATIVE — SIGNIFICANT CHANGE UP
BUN SERPL-MCNC: 6.7 MG/DL — LOW (ref 8–20)
CALCIUM SERPL-MCNC: 9.1 MG/DL — SIGNIFICANT CHANGE UP (ref 8.4–10.5)
CHLORIDE SERPL-SCNC: 100 MMOL/L — SIGNIFICANT CHANGE UP (ref 98–107)
CO2 SERPL-SCNC: 23 MMOL/L — SIGNIFICANT CHANGE UP (ref 22–29)
COLOR SPEC: YELLOW — SIGNIFICANT CHANGE UP
CREAT SERPL-MCNC: 0.58 MG/DL — SIGNIFICANT CHANGE UP (ref 0.5–1.3)
DIFF PNL FLD: NEGATIVE — SIGNIFICANT CHANGE UP
EGFR: 102 ML/MIN/1.73M2 — SIGNIFICANT CHANGE UP
EOSINOPHIL # BLD AUTO: 0.18 K/UL — SIGNIFICANT CHANGE UP (ref 0–0.5)
EOSINOPHIL NFR BLD AUTO: 2 % — SIGNIFICANT CHANGE UP (ref 0–6)
EPI CELLS # UR: ABNORMAL
ETHANOL SERPL-MCNC: <10 MG/DL — SIGNIFICANT CHANGE UP (ref 0–9)
GLUCOSE SERPL-MCNC: 98 MG/DL — SIGNIFICANT CHANGE UP (ref 70–99)
GLUCOSE UR QL: NEGATIVE MG/DL — SIGNIFICANT CHANGE UP
HCT VFR BLD CALC: 42.3 % — SIGNIFICANT CHANGE UP (ref 34.5–45)
HGB BLD-MCNC: 14.1 G/DL — SIGNIFICANT CHANGE UP (ref 11.5–15.5)
IMM GRANULOCYTES NFR BLD AUTO: 0.3 % — SIGNIFICANT CHANGE UP (ref 0–0.9)
KETONES UR-MCNC: NEGATIVE — SIGNIFICANT CHANGE UP
LEUKOCYTE ESTERASE UR-ACNC: NEGATIVE — SIGNIFICANT CHANGE UP
LYMPHOCYTES # BLD AUTO: 2.03 K/UL — SIGNIFICANT CHANGE UP (ref 1–3.3)
LYMPHOCYTES # BLD AUTO: 22 % — SIGNIFICANT CHANGE UP (ref 13–44)
MCHC RBC-ENTMCNC: 28.7 PG — SIGNIFICANT CHANGE UP (ref 27–34)
MCHC RBC-ENTMCNC: 33.3 GM/DL — SIGNIFICANT CHANGE UP (ref 32–36)
MCV RBC AUTO: 86 FL — SIGNIFICANT CHANGE UP (ref 80–100)
MONOCYTES # BLD AUTO: 0.6 K/UL — SIGNIFICANT CHANGE UP (ref 0–0.9)
MONOCYTES NFR BLD AUTO: 6.5 % — SIGNIFICANT CHANGE UP (ref 2–14)
NEUTROPHILS # BLD AUTO: 6.35 K/UL — SIGNIFICANT CHANGE UP (ref 1.8–7.4)
NEUTROPHILS NFR BLD AUTO: 68.8 % — SIGNIFICANT CHANGE UP (ref 43–77)
NITRITE UR-MCNC: NEGATIVE — SIGNIFICANT CHANGE UP
PCP SPEC-MCNC: SIGNIFICANT CHANGE UP
PH UR: 7 — SIGNIFICANT CHANGE UP (ref 5–8)
PLATELET # BLD AUTO: 258 K/UL — SIGNIFICANT CHANGE UP (ref 150–400)
POTASSIUM SERPL-MCNC: 3.5 MMOL/L — SIGNIFICANT CHANGE UP (ref 3.5–5.3)
POTASSIUM SERPL-SCNC: 3.5 MMOL/L — SIGNIFICANT CHANGE UP (ref 3.5–5.3)
PROT UR-MCNC: NEGATIVE — SIGNIFICANT CHANGE UP
RBC # BLD: 4.92 M/UL — SIGNIFICANT CHANGE UP (ref 3.8–5.2)
RBC # FLD: 12.7 % — SIGNIFICANT CHANGE UP (ref 10.3–14.5)
RBC CASTS # UR COMP ASSIST: NEGATIVE /HPF — SIGNIFICANT CHANGE UP (ref 0–4)
SALICYLATES SERPL-MCNC: <0.6 MG/DL — LOW (ref 10–20)
SARS-COV-2 RNA SPEC QL NAA+PROBE: SIGNIFICANT CHANGE UP
SODIUM SERPL-SCNC: 135 MMOL/L — SIGNIFICANT CHANGE UP (ref 135–145)
SP GR SPEC: 1.01 — SIGNIFICANT CHANGE UP (ref 1.01–1.02)
UROBILINOGEN FLD QL: 1 MG/DL
WBC # BLD: 9.23 K/UL — SIGNIFICANT CHANGE UP (ref 3.8–10.5)
WBC # FLD AUTO: 9.23 K/UL — SIGNIFICANT CHANGE UP (ref 3.8–10.5)
WBC UR QL: NEGATIVE /HPF — SIGNIFICANT CHANGE UP (ref 0–5)

## 2022-09-16 PROCEDURE — 93010 ELECTROCARDIOGRAM REPORT: CPT

## 2022-09-16 PROCEDURE — 99220: CPT

## 2022-09-16 PROCEDURE — 90792 PSYCH DIAG EVAL W/MED SRVCS: CPT

## 2022-09-16 RX ORDER — HALOPERIDOL DECANOATE 100 MG/ML
5 INJECTION INTRAMUSCULAR EVERY 6 HOURS
Refills: 0 | Status: DISCONTINUED | OUTPATIENT
Start: 2022-09-16 | End: 2022-09-20

## 2022-09-16 RX ORDER — BUDESONIDE AND FORMOTEROL FUMARATE DIHYDRATE 160; 4.5 UG/1; UG/1
2 AEROSOL RESPIRATORY (INHALATION)
Refills: 0 | Status: DISCONTINUED | OUTPATIENT
Start: 2022-09-16 | End: 2022-09-20

## 2022-09-16 RX ORDER — CLONAZEPAM 1 MG
0.5 TABLET ORAL
Refills: 0 | Status: COMPLETED | OUTPATIENT
Start: 2022-09-16 | End: 2022-09-23

## 2022-09-16 RX ORDER — MIDAZOLAM HYDROCHLORIDE 1 MG/ML
5 INJECTION, SOLUTION INTRAMUSCULAR; INTRAVENOUS ONCE
Refills: 0 | Status: DISCONTINUED | OUTPATIENT
Start: 2022-09-16 | End: 2022-09-16

## 2022-09-16 RX ORDER — HALOPERIDOL DECANOATE 100 MG/ML
5 INJECTION INTRAMUSCULAR ONCE
Refills: 0 | Status: COMPLETED | OUTPATIENT
Start: 2022-09-16 | End: 2022-09-16

## 2022-09-16 RX ORDER — QUETIAPINE FUMARATE 200 MG/1
50 TABLET, FILM COATED ORAL AT BEDTIME
Refills: 0 | Status: DISCONTINUED | OUTPATIENT
Start: 2022-09-16 | End: 2022-09-20

## 2022-09-16 RX ORDER — DIPHENHYDRAMINE HCL 50 MG
50 CAPSULE ORAL EVERY 6 HOURS
Refills: 0 | Status: DISCONTINUED | OUTPATIENT
Start: 2022-09-16 | End: 2022-09-20

## 2022-09-16 RX ADMIN — HALOPERIDOL DECANOATE 5 MILLIGRAM(S): 100 INJECTION INTRAMUSCULAR at 10:57

## 2022-09-16 RX ADMIN — MIDAZOLAM HYDROCHLORIDE 5 MILLIGRAM(S): 1 INJECTION, SOLUTION INTRAMUSCULAR; INTRAVENOUS at 10:58

## 2022-09-16 RX ADMIN — BUDESONIDE AND FORMOTEROL FUMARATE DIHYDRATE 2 PUFF(S): 160; 4.5 AEROSOL RESPIRATORY (INHALATION) at 20:15

## 2022-09-16 RX ADMIN — QUETIAPINE FUMARATE 50 MILLIGRAM(S): 200 TABLET, FILM COATED ORAL at 21:30

## 2022-09-16 RX ADMIN — Medication 0.5 MILLIGRAM(S): at 21:47

## 2022-09-16 NOTE — ED BEHAVIORAL HEALTH ASSESSMENT NOTE - SUMMARY
Patient is a 61 year old single female; domiciled with boyfriend Fredy; noncaregiver; unemployed; PPH of depression, PTSD, and schizoaffective disorder; multiple prior psychiatric hospitalizations most recently in [facility] (past few months unknown time frame); 2 known suicide attempt by OD; [facility] provider Nithya Pickens; no known prior history of violence or arrests; no known active substance abuse or known history of complicated withdrawal; PMH of unknown; brought in by EMS; called by police; presenting with disorganized behavior.    Pt had extremely disorganized and erratic behavior. Pt could not report why she was here or what happened in a logical manner. Pt was a poor historian, very limited info provided by patient and collateral. Will continue to call collateral. Patient is a 61 year old single female; domiciled with boyfriend Fredy; noncaregiver; unemployed; PPH of depression, PTSD, and schizoaffective disorder vs bipolar disorder ; multiple prior psychiatric hospitalizations most recently in [facility] ( last hospitalized from 6/27-7/18/22) ; prior discharge dx with bipolar disorder MRE manic with psychotic features,  2 known suicide attempt by OD; followed by [facility] with provider Nithya Pickens; no known prior history of violence or arrests; no known active substance abuse or known history of complicated withdrawal; PMH of COPD, HTN ; brought in by EMS; called by police; presenting with disorganized behavior.    Pt had extremely disorganized and erratic behavior with unclear compliance with medications, patient has been decompensating,  Presenting manic with psychotic features. Pt could not report why she was here or what happened in a logical manner. Pt was a poor historian, very limited info provided by patient and collateral. Requires inpatient psychiatric hospitalization for safety and stabilization.

## 2022-09-16 NOTE — ED BEHAVIORAL HEALTH ASSESSMENT NOTE - DIFFERENTIAL
unspecified psychosis vs substance induced mood disorder unspecified psychosis vs substance induced mood disorder vs Bipolar disorder manic with psychotic featrues.

## 2022-09-16 NOTE — ED ADULT NURSE NOTE - NSIMPLEMENTINTERV_GEN_ALL_ED
Implemented All Fall Risk Interventions:  [place] to call system. Call bell, personal items and telephone within reach. Instruct patient to call for assistance. Room bathroom lighting operational. Non-slip footwear when patient is off stretcher. Physically safe environment: no spills, clutter or unnecessary equipment. Stretcher in lowest position, wheels locked, appropriate side rails in place. Provide visual cue, wrist band, yellow gown, etc. Monitor gait and stability. Monitor for mental status changes and reorient to person, place, and time. Review medications for side effects contributing to fall risk. Reinforce activity limits and safety measures with patient and family.

## 2022-09-16 NOTE — ED BEHAVIORAL HEALTH ASSESSMENT NOTE - OTHER PAST PSYCHIATRIC HISTORY (INCLUDE DETAILS REGARDING ONSET, COURSE OF ILLNESS, INPATIENT/OUTPATIENT TREATMENT)
Per prior note: depression, PTSD, schizoaffective disorder. Multiple prior psychiatric hospitalizations, most recently in [facility] (within past couple months unknown exact date/length of stay). Per prior note is in contact with [facility] with provider Nithya Pickens. Per prior note: depression, PTSD, schizoaffective disorder. Multiple prior psychiatric hospitalizations, most recently in [facility] discharged in July.  . Per prior note [facility].

## 2022-09-16 NOTE — ED BEHAVIORAL HEALTH ASSESSMENT NOTE - HPI (INCLUDE ILLNESS QUALITY, SEVERITY, DURATION, TIMING, CONTEXT, MODIFYING FACTORS, ASSOCIATED SIGNS AND SYMPTOMS)
Patient is a 61 year old single female; domiciled with boyfriend Fredy; noncaregiver; unemployed; PPH of depression, PTSD, and schizoaffective disorder; multiple prior psychiatric hospitalizations most recently in [facility] (past few months unknown time frame); 2 known suicide attempt by OD; [facility]; no known prior history of violence or arrests; no known active substance abuse or known history of complicated withdrawal; PMH of unknown; brought in by EMS; called by police; presenting with disorganized behavior.    Most of the information above collected from 6/26/22 chart due to pt being a very poor historian. Pt was uncooperative and getting increasingly agitated during interview. Pt was acting very erratic and disorganized, had loose associations, tangential and circumstantial thought process. [place] rented a mercedes and on an "autobon." Pt was observed stabbing a meatball with the wrong side of a spoon. Pt wrote notes and put them into multiple water bottles. Pt also observed wrapping sheets around her head and placing tissue paper up her nose, then taping the tissue paper over her eyes with a band aid. When interviewing the patient, the patient would not respond or answered with disorganized speech. The patient denies depression or other significant mood symptoms.  Specifically, the patient denies manic symptoms, past and present.  The patient denies auditory or visual hallucinations, and no delusions could be elicited on direct questioning.  The patient denies suicidal ideation, homicidal ideation, intent, or plan.     Collateral: [place] ([phone])  Interviewer called collateral, but got disconnected multiple times. Left multiple messages asking collateral to call back. Fredy states the patient has been worsening over the past couple months. Fredy confirms pt went to [facility] after experiencing increasingly paranoia. Reports pt was on prozac and trazadone for about 25 years but switched to Seroquel about a year and a half ago. Collateral repeatedly states her increasing paranoia and mental health decline, but has limited insight into why the paranoia has gotten worse. Collateral was disconnected. Left two voicemails. Patient is a 61 year old single female; domiciled with boyfriend Fredy; noncaregiver; unemployed; PPH of depression, PTSD, and schizoaffective disorder; multiple prior psychiatric hospitalizations most recently in [facility] ( last hospitalized from 6/27-7/18/22) ; 2 known suicide attempt by OD; followed by [facility] with provider Nithya Pickens; no known prior history of violence or arrests; no known active substance abuse or known history of complicated withdrawal; PMH of COPD, HTN ; brought in by EMS; called by police; presenting with disorganized behavior.  Reviewed prior presentation to ER on  6/26/22 . pt being a very poor historian due to disorganized speech and behavior. . Pt was uncooperative and getting increasingly agitated during interview. Pt was acting very erratic and disorganized, had loose associations, tangential and circumstantial thought process. Pt [place] and [place] rented a mercedes and on an "autobon." Pt was observed spearing  a meatball with the wrong side of a spoon. Pt wrote notes and put them into multiple water bottles. Pt also observed wrapping sheets around her head and placing tissue paper up her nose, then taping the tissue paper over her eyes with a band aid. When interviewing the patient, the patient would not respond or answered with disorganized speech      . The patient denies depressed mood. When asked about suicidality patient stated "that is a tricky question" but then denied.  She stated that she was "catching a fish of the lake with a bullet car..".     Collateral: [place] ([phone])  Interviewer called collateral, but got disconnected multiple times. Left multiple messages asking collateral to call back. Fredy states the patient has been worsening over the past weeks. . Fredy confirms pt went to [facility] after experiencing increasingly paranoia. Reports pt was on prozac and trazadone for about 25 years but switched to Seroquel about a year and a half ago. Collateral repeatedly states her increasing paranoia and mental health decline, but has limited insight into why the paranoia has gotten worse.

## 2022-09-16 NOTE — ED BEHAVIORAL HEALTH ASSESSMENT NOTE - DETAILS
unknown. Pt admitted to [facility] 6/26/2022 then transferred to [facility] but for unknown dates. Unable to assess. See HPI. none known. see HPI see above

## 2022-09-16 NOTE — ED BEHAVIORAL HEALTH ASSESSMENT NOTE - OTHER
long term boyfriend Fredy unknown type of residence Police called EMS pt denies any hallucinations, but [place]. see above. looking for [facility] home

## 2022-09-16 NOTE — ED BEHAVIORAL HEALTH ASSESSMENT NOTE - DESCRIPTION
unable to assess. Pt increasingly agitated, extremely disorganized speech and behavior. Pt uncooperative and distracted during interview. See HPI for details on observed behavior. Pt given Haldol IM 5 mg, midazolam IM 5 mg due to agitation.     Vital Signs Last 24 Hrs  T(C): 36.6 (16 Sep 2022 11:27), Max: 37 (16 Sep 2022 09:54)  T(F): 97.8 (16 Sep 2022 11:27), Max: 98.6 (16 Sep 2022 09:54)  HR: 93 (16 Sep 2022 11:27) (93 - 101)  BP: 126/79 (16 Sep 2022 11:27) (126/79 - 158/98)  BP(mean): --  ABP: --  ABP(mean): --  RR: 18 (16 Sep 2022 11:27) (18 - 20)  SpO2: 96% (16 Sep 2022 11:27) (95% - 96%)    O2 Parameters below as of 16 Sep 2022 11:27  Patient On (Oxygen Delivery Method): room air Pt lives with boyfriend, unemployed, unknown nicotine and substance use.

## 2022-09-16 NOTE — ED BEHAVIORAL HEALTH ASSESSMENT NOTE - RISK ASSESSMENT
Risk factors: previous psych hospitalizations, unemployed, limited support from family/friends, previous 2 SA  Protective factors: current residential stability, long term relationship. Moderate Acute Suicide Risk Risk fac, tors: previous psych hospitalizations, unemployed, limited support from family/friends, previous 2 suicide attempt, disorganized thought process, behavior, unclear medications compliance  Protective factors: current residential stability, long term relationship.

## 2022-09-16 NOTE — ED PROVIDER NOTE - OBJECTIVE STATEMENT
patients actual name Ness [facility] [birth year]    61 yo female with hx bipolar, brought in for evaluation patients actual name Ness Juarez [birth year]    63 yo female with hx bipolar, brought in for evaluation. Patient was agitated at home, and was found by EMS running down the street. Family told EMS patient stripped naked in front of her family yesterday and hosed herself down  patient now using her sneakers as aphone and putting her socks over her eyes  patient not answering questions

## 2022-09-16 NOTE — ED BEHAVIORAL HEALTH ASSESSMENT NOTE - PSYCHIATRIC ISSUES AND PLAN (INCLUDE STANDING AND PRN MEDICATION)
see HPI, Will place on Haldol/Ativan/Benadryl PRN. Continue Quetiapine 50 mg at night. Klonopin 0.5 mg twice daily, Treatment team to evaluate and make further recommendations

## 2022-09-16 NOTE — ED BEHAVIORAL HEALTH ASSESSMENT NOTE - VIOLENCE RISK FACTORS:
Affective dysregulation/Lack of insight into violence risk/need for treatment/Noncompliance with treatment/Community stressors that increase the risk of destabilization/Irritability

## 2022-09-16 NOTE — ED CDU PROVIDER INITIAL DAY NOTE - OBJECTIVE STATEMENT
patients actual name Ness Juarez [birth year]    61 yo female with hx bipolar, brought in for evaluation. Patient was agitated at home, and was found by EMS running down the street. Family told EMS patient stripped naked in front of her family yesterday and hosed herself down  patient now using her sneakers as aphone and putting her socks over her eyes  patient not answering questions

## 2022-09-17 PROCEDURE — 99226: CPT

## 2022-09-17 RX ADMIN — QUETIAPINE FUMARATE 50 MILLIGRAM(S): 200 TABLET, FILM COATED ORAL at 21:44

## 2022-09-17 RX ADMIN — Medication 0.5 MILLIGRAM(S): at 19:34

## 2022-09-17 RX ADMIN — BUDESONIDE AND FORMOTEROL FUMARATE DIHYDRATE 2 PUFF(S): 160; 4.5 AEROSOL RESPIRATORY (INHALATION) at 21:24

## 2022-09-17 RX ADMIN — Medication 0.5 MILLIGRAM(S): at 06:56

## 2022-09-17 RX ADMIN — BUDESONIDE AND FORMOTEROL FUMARATE DIHYDRATE 2 PUFF(S): 160; 4.5 AEROSOL RESPIRATORY (INHALATION) at 04:55

## 2022-09-18 PROCEDURE — 99226: CPT

## 2022-09-18 RX ORDER — ACETAMINOPHEN 500 MG
650 TABLET ORAL ONCE
Refills: 0 | Status: COMPLETED | OUTPATIENT
Start: 2022-09-18 | End: 2022-09-18

## 2022-09-18 RX ORDER — CLONAZEPAM 1 MG
0.5 TABLET ORAL ONCE
Refills: 0 | Status: DISCONTINUED | OUTPATIENT
Start: 2022-09-18 | End: 2022-09-18

## 2022-09-18 RX ADMIN — BUDESONIDE AND FORMOTEROL FUMARATE DIHYDRATE 2 PUFF(S): 160; 4.5 AEROSOL RESPIRATORY (INHALATION) at 19:01

## 2022-09-18 RX ADMIN — Medication 650 MILLIGRAM(S): at 01:11

## 2022-09-18 RX ADMIN — Medication 0.5 MILLIGRAM(S): at 18:38

## 2022-09-18 RX ADMIN — Medication 0.5 MILLIGRAM(S): at 12:13

## 2022-09-18 RX ADMIN — Medication 0.5 MILLIGRAM(S): at 06:21

## 2022-09-18 RX ADMIN — QUETIAPINE FUMARATE 50 MILLIGRAM(S): 200 TABLET, FILM COATED ORAL at 22:01

## 2022-09-18 RX ADMIN — BUDESONIDE AND FORMOTEROL FUMARATE DIHYDRATE 2 PUFF(S): 160; 4.5 AEROSOL RESPIRATORY (INHALATION) at 09:00

## 2022-09-18 RX ADMIN — Medication 650 MILLIGRAM(S): at 18:39

## 2022-09-18 NOTE — ED ADULT NURSE REASSESSMENT NOTE - NSIMPLEMENTINTERV_GEN_ALL_ED
Implemented All Universal Safety Interventions:  [place] to call system. Call bell, personal items and telephone within reach. Instruct patient to call for assistance. Room bathroom lighting operational. Non-slip footwear when patient is off stretcher. Physically safe environment: no spills, clutter or unnecessary equipment. Stretcher in lowest position, wheels locked, appropriate side rails in place.
Implemented All Universal Safety Interventions:  [place] to call system. Call bell, personal items and telephone within reach. Instruct patient to call for assistance. Room bathroom lighting operational. Non-slip footwear when patient is off stretcher. Physically safe environment: no spills, clutter or unnecessary equipment. Stretcher in lowest position, wheels locked, appropriate side rails in place.
Implemented All Universal Safety Interventions:  [place] to call system. Call bell, personal items and telephone within reach. Instruct patient to call for assistance. Room bathroom lighting operational. Non-slip footwear when patient is off stretcher. Physically safe environment: no spills, clutter or unnecessary equipment. Stretcher in lowest position, wheels locked, appropriate side rails in place.

## 2022-09-18 NOTE — ED ADULT NURSE REASSESSMENT NOTE - DESCRIPTION
presents with a full affect. denies suicidal or homicidal ideations.
received report.  received pt ambulatory in unit. lamine balbuena.  awaiting transfer
received report.  received pt asitting in chair at door way of room.  pt is pleasant at this time.  remains disorganized.  no harm to self or others will monitor

## 2022-09-19 VITALS
DIASTOLIC BLOOD PRESSURE: 87 MMHG | SYSTOLIC BLOOD PRESSURE: 146 MMHG | RESPIRATION RATE: 19 BRPM | OXYGEN SATURATION: 98 % | HEART RATE: 98 BPM | TEMPERATURE: 98 F

## 2022-09-19 LAB — SARS-COV-2 RNA SPEC QL NAA+PROBE: SIGNIFICANT CHANGE UP

## 2022-09-19 PROCEDURE — 99284 EMERGENCY DEPT VISIT MOD MDM: CPT | Mod: 25

## 2022-09-19 PROCEDURE — 80048 BASIC METABOLIC PNL TOTAL CA: CPT

## 2022-09-19 PROCEDURE — U0003: CPT

## 2022-09-19 PROCEDURE — 85025 COMPLETE CBC W/AUTO DIFF WBC: CPT

## 2022-09-19 PROCEDURE — 80307 DRUG TEST PRSMV CHEM ANLYZR: CPT

## 2022-09-19 PROCEDURE — 96372 THER/PROPH/DIAG INJ SC/IM: CPT

## 2022-09-19 PROCEDURE — U0005: CPT

## 2022-09-19 PROCEDURE — 93005 ELECTROCARDIOGRAM TRACING: CPT

## 2022-09-19 PROCEDURE — 99217: CPT

## 2022-09-19 PROCEDURE — 99213 OFFICE O/P EST LOW 20 MIN: CPT

## 2022-09-19 PROCEDURE — 36415 COLL VENOUS BLD VENIPUNCTURE: CPT

## 2022-09-19 PROCEDURE — G0378: CPT

## 2022-09-19 PROCEDURE — 94640 AIRWAY INHALATION TREATMENT: CPT

## 2022-09-19 PROCEDURE — 81001 URINALYSIS AUTO W/SCOPE: CPT

## 2022-09-19 RX ORDER — QUETIAPINE FUMARATE 200 MG/1
100 TABLET, FILM COATED ORAL ONCE
Refills: 0 | Status: COMPLETED | OUTPATIENT
Start: 2022-09-19 | End: 2022-09-19

## 2022-09-19 RX ADMIN — QUETIAPINE FUMARATE 100 MILLIGRAM(S): 200 TABLET, FILM COATED ORAL at 03:02

## 2022-09-19 RX ADMIN — BUDESONIDE AND FORMOTEROL FUMARATE DIHYDRATE 2 PUFF(S): 160; 4.5 AEROSOL RESPIRATORY (INHALATION) at 08:36

## 2022-09-19 RX ADMIN — Medication 0.5 MILLIGRAM(S): at 06:34

## 2022-09-19 NOTE — ED BEHAVIORAL HEALTH NOTE - BEHAVIORAL HEALTH NOTE
PROGRESS NOTE: 09-19-22 @ 08:37  	  • Reason for Ongoing Consultation: Reassessment of Bizarre behavior 	    ID: 62yyo Female with HEALTH ISSUES - PROBLEM Dx:  Psychosis, unspecified psychosis type            INTERVAL DATA:   • Interval Chief Complaint: "I am wonderful"   • Interval History: Patient is a 61 year old female; domiciled with boyfriend; noncaregiver; unemployed; PPH of depression, PTSD, and schizoaffective disorder; multiple prior hospitalizations (most recently [facility] 6/27-7/18); 2 known suicide attempts by OD; no known history of violence or arrests; no known active substance abuse or known history of complicated withdrawal; PMH of COPD and HTN; brought in by EMS; called by police; presenting with disorganized behavior.    Pt was not in any acute distress and was cooperative. Pt had disorganized and tangential thought process. Pt states she feels "wonderful" and denies any mood symptoms when asked. Pt states that she is "being treated so well here."  Pt reports she talked to her boyfriend last night and they discussed their goal of "getting her healthy." Pt was distracted during interview, but answered majority of the questions. The patient denies depression or other significant mood symptoms.  Specifically, the patient denies manic symptoms, past and present.  The patient denies auditory or visual hallucinations, and no delusions could be elicited on direct questioning.  The patient denies suicidal ideation, homicidal ideation, intent, or plan. Pt denied any acute concerns or current mood symptoms. Pt has been compliant with medications.     REVIEW OF SYSTEMS:   • Constitutional Symptoms	No complaints  • Eyes	No complaints  • Ears / Nose / Throat / Mouth	No complaints  • Cardiovascular	No complaints  • Respiratory	No complaints  • Gastrointestinal	No complaints  • Genitourinary	No complaints  • Musculoskeletal	No complaints  • Skin	No complaints  • Neurological	No complaints  • Psychiatric (see HPI)	See HPI  • Endocrine	No complaints  • Hematologic / Lymphatic	No complaints  • Allergic / Immunologic	No complaints    REVIEW OF VITALS/LABS/IMAGING/INVESTIGATIONS:   • Vital signs reviewed: Yes  • Vital Signs:	    T(C): 36.4 (09-19-22 @ 07:32), Max: 36.7 (09-18-22 @ 19:42)  HR: 89 (09-19-22 @ 07:32) (85 - 98)  BP: 107/75 (09-19-22 @ 07:32) (107/75 - 161/79)  RR: 19 (09-19-22 @ 07:32) (18 - 19)  SpO2: 97% (09-19-22 @ 07:32) (94% - 97%)    • Available labs reviewed: Yes  • Available Lab Results:                       MEDICATIONS:      PRN Medications:  • PRN Medications since last evaluation	  • PRN Details: none known. 	    Current Medications:   budesonide  80 MICROgram(s)/formoterol 4.5 MICROgram(s) Inhaler 2 Puff(s) Inhalation two times a day  clonazePAM  Tablet 0.5 milliGRAM(s) Oral two times a day  diphenhydrAMINE Injectable 50 milliGRAM(s) IntraMuscular every 6 hours PRN  haloperidol    Injectable 5 milliGRAM(s) IntraMuscular every 6 hours PRN  LORazepam   Injectable 2 milliGRAM(s) IntraMuscular every 6 hours PRN  QUEtiapine 50 milliGRAM(s) Oral at bedtime     Medication Side Effects:  • Medication Side Effects or Adverse Reactions (new or ongoing)	None known    MENTAL STATUS EXAM:   • Level of Consciousness	Alert  • General Appearance	Well developed  • Body Habitus	Well nourished  • Hygiene	poor  • Grooming	poor  • Behavior	cooperative  • Eye Contact	Good  • Relatedness        fair   • Impulse Control	impaired   • Muscle Tone / Strength	Normal muscle tone/strength  • Abnormal Movements	No abnormal movements  • Gait / Station	Normal gait / station  • Speech Volume	Normal  • Speech Rate	Normal  • Speech Spontaneity	Normal  • Speech Articulation	Normal  • Mood	Normal  • Affect Quality	normal   • Affect Range	Full  • Affect Congruence	Congruent  • Thought Process	disorganized, tangential, flight of ideas   • Thought Associations	loose  • Thought Content	Unremarkable  • Perceptions	No abnormalities  • Oriented to Time	Yes  • Oriented to Place	Yes  • Oriented to Situation	Yes  • Oriented to Person	Yes  • Attention / Concentration	impaired   • Estimated Intelligence	Average  • Recent Memory	Normal  • Remote Memory	Normal  • Fund of Knowledge	Normal  • Language	No abnormalities noted  • Judgment (regarding everyday events)	poor  • Insight (regarding psychiatric illness)	poor     SUICIDALITY:   • Suicidality (Interval)	none known    HOMICIDALITY/AGGRESSION:   • Homicidality/Aggression	none known    DIAGNOSIS DSM-V:    Psychiatric Diagnosis (Corresponds to DSM-IV Axis I, II):   HEALTH ISSUES - PROBLEM Dx:  Psychosis, unspecified psychosis type             Medical Diagnosis (Corresponds to DSM-IV Axis III):  • Axis III	  COPD, HTN    ASSESSMENT OF CURRENT CONDITION:   Summary (include case differential, formulation and patient response to therapy):   Pt was calm, cooperative, and not in any acute distress. Pt had disorganized speech and tangential thought process. Pt appeared distracted upon interview. Pt states she is feeling "wonderful" and that she is "being treated so well here." Pt reports she is open to "getting treated and healthy" and discussed this plan with her boyfriend. The patient denies depression or other significant mood symptoms.  Specifically, the patient denies manic symptoms, past and present.  The patient denies auditory or visual hallucinations, and no delusions could be elicited on direct questioning.  The patient denies suicidal ideation, homicidal ideation, intent, or plan. Pt denied any acute concerns or current mood symptoms. Pt still presented with disorganized speech and behavior. Pt has been compliant with medications.       Risk Assessment (consider static vs modifiable risk factors and protective factors; comment on level of risk for dangerous behavior):   Risk factors: 2 previous SA, unemployed, mood disorder, history of hospitalizations   Protective factors: relationship stability, residential stability   Moderate risk for dangerous behavior    PLAN  Awaiting transfer to inpatient bed at [facility]. Continue medications. PROGRESS NOTE: 09-19-22 @ 08:37  	  • Reason for Ongoing Consultation: Reassessment of Bizarre behavior 	    ID: 62yyo Female with HEALTH ISSUES - PROBLEM Dx:  Psychosis, unspecified psychosis type            INTERVAL DATA:   • Interval Chief Complaint: "I am wonderful"   • Interval History: Patient is a 61 year old female; domiciled with boyfriend; noncaregiver; unemployed; PPH of depression, PTSD, and schizoaffective disorder; multiple prior hospitalizations (most recently [facility] 6/27-7/18); gets seen by [facility] with provider Nithya Pickens, with  2 known suicide attempts by OD; no known history of violence or arrests; no known active substance abuse or known history of complicated withdrawal; PMH of COPD and HTN; brought in by EMS; called by police; presenting with disorganized behavior.      Pt was not in any acute distress and was cooperative. Pt had disorganized and tangential thought process. Pt states she feels "wonderful" and denies any mood symptoms when asked. Pt states that she is "being treated so well here."  Pt reports she talked to her boyfriend last night and they discussed their goal of "getting her healthy." Pt was distracted during interview, but answered majority of the questions. The patient denies depression or other significant mood symptoms.  Specifically, the patient denies manic symptoms, past and present.  The patient denies auditory or visual hallucinations, and no delusions could be elicited on direct questioning.  The patient denies suicidal ideation, homicidal ideation, intent, or plan. Pt denied any acute concerns or current mood symptoms. Pt has been compliant with medications. Pt asked for a dose of Seroquel around 1:30 am, pt received Seroquel 100 mg PO at 3:02 am 9/19/22.     REVIEW OF SYSTEMS:   • Constitutional Symptoms	No complaints  • Eyes	No complaints  • Ears / Nose / Throat / Mouth	No complaints  • Cardiovascular	No complaints  • Respiratory	No complaints  • Gastrointestinal	No complaints  • Genitourinary	No complaints  • Musculoskeletal	No complaints  • Skin	No complaints  • Neurological	No complaints  • Psychiatric (see HPI)	See HPI  • Endocrine	No complaints  • Hematologic / Lymphatic	No complaints  • Allergic / Immunologic	No complaints    REVIEW OF VITALS/LABS/IMAGING/INVESTIGATIONS:   • Vital signs reviewed: Yes  • Vital Signs:	    T(C): 36.4 (09-19-22 @ 07:32), Max: 36.7 (09-18-22 @ 19:42)  HR: 89 (09-19-22 @ 07:32) (85 - 98)  BP: 107/75 (09-19-22 @ 07:32) (107/75 - 161/79)  RR: 19 (09-19-22 @ 07:32) (18 - 19)  SpO2: 97% (09-19-22 @ 07:32) (94% - 97%)    • Available labs reviewed: Yes  • Available Lab Results:                       MEDICATIONS:      PRN Medications:  • PRN Medications since last evaluation	  • PRN Details:  pt received Seroquel 100 mg PO at 3:02 am 9/19/22.     Current Medications:   budesonide  80 MICROgram(s)/formoterol 4.5 MICROgram(s) Inhaler 2 Puff(s) Inhalation two times a day  clonazePAM  Tablet 0.5 milliGRAM(s) Oral two times a day  diphenhydrAMINE Injectable 50 milliGRAM(s) IntraMuscular every 6 hours PRN  haloperidol    Injectable 5 milliGRAM(s) IntraMuscular every 6 hours PRN  LORazepam   Injectable 2 milliGRAM(s) IntraMuscular every 6 hours PRN  QUEtiapine 50 milliGRAM(s) Oral at bedtime     Medication Side Effects:  • Medication Side Effects or Adverse Reactions (new or ongoing)	None known    MENTAL STATUS EXAM:   • Level of Consciousness	Alert  • General Appearance	Well developed  • Body Habitus	Well nourished  • Hygiene	poor  • Grooming	poor  • Behavior	cooperative  • Eye Contact	Good  • Relatedness        fair   • Impulse Control	impaired   • Muscle Tone / Strength	Normal muscle tone/strength  • Abnormal Movements	No abnormal movements  • Gait / Station	Normal gait / station  • Speech Volume	Normal  • Speech Rate	Normal  • Speech Spontaneity	Normal  • Speech Articulation	Normal  • Mood	Normal  • Affect Quality	normal   • Affect Range	Full  • Affect Congruence	Congruent  • Thought Process	disorganized, tangential, flight of ideas   • Thought Associations	loose  • Thought Content	Unremarkable  • Perceptions	No abnormalities  • Oriented to Time	Yes  • Oriented to Place	Yes  • Oriented to Situation	Yes  • Oriented to Person	Yes  • Attention / Concentration	impaired   • Estimated Intelligence	Average  • Recent Memory	Normal  • Remote Memory	Normal  • Fund of Knowledge	Normal  • Language	No abnormalities noted  • Judgment (regarding everyday events)	poor  • Insight (regarding psychiatric illness)	poor     SUICIDALITY:   • Suicidality (Interval)	none known    HOMICIDALITY/AGGRESSION:   • Homicidality/Aggression	none known    DIAGNOSIS DSM-V:    Psychiatric Diagnosis (Corresponds to DSM-IV Axis I, II):   HEALTH ISSUES - PROBLEM Dx:  Psychosis, unspecified psychosis type             Medical Diagnosis (Corresponds to DSM-IV Axis III):  • Axis III	  COPD, HTN    ASSESSMENT OF CURRENT CONDITION:   Summary (include case differential, formulation and patient response to therapy):   Pt was calm, cooperative, and not in any acute distress. Pt had disorganized speech and tangential thought process. Pt appeared distracted upon interview. Pt states she is feeling "wonderful" and that she is "being treated so well here." Pt reports she is open to "getting treated and healthy" and discussed this plan with her boyfriend. The patient denies depression or other significant mood symptoms.  Specifically, the patient denies manic symptoms, past and present.  The patient denies auditory or visual hallucinations, and no delusions could be elicited on direct questioning.  The patient denies suicidal ideation, homicidal ideation, intent, or plan. Pt denied any acute concerns or current mood symptoms. Pt still presented with disorganized speech and behavior. Pt has been compliant with medications.       Risk Assessment (consider static vs modifiable risk factors and protective factors; comment on level of risk for dangerous behavior):   Risk factors: 2 previous SA, unemployed, mood disorder, history of hospitalizations   Protective factors: relationship stability, residential stability   Moderate risk for dangerous behavior    PLAN  Awaiting transfer to inpatient bed at [facility]. Continue medications. PROGRESS NOTE: 09-19-22 @ 08:37  	  • Reason for Ongoing Consultation: Reassessment of Bizarre behavior 	    ID: 62yyo Female with HEALTH ISSUES - PROBLEM Dx:  Psychosis, unspecified psychosis type            INTERVAL DATA:   • Interval Chief Complaint: "I am wonderful"   • Interval History: Patient is a 61 year old female; domiciled with boyfriend; noncaregiver; unemployed; PPH of depression, PTSD, and schizoaffective disorder; multiple prior hospitalizations (most recently [facility] 6/27-7/18); gets seen by [facility] with provider Nithya Pickens, with  2 known suicide attempts by OD; no known history of violence or arrests; no known active substance abuse or known history of complicated withdrawal; PMH of COPD and HTN; brought in by EMS; called by police; presenting with disorganized behavior.      Pt was not in any acute distress and was cooperative. Pt had disorganized and tangential thought process. Pt states she feels "wonderful" and denies any mood symptoms when asked. Pt states that she is "being treated so well here."  Pt reports she talked to her boyfriend last night and they discussed their goal of "getting her healthy." Pt was distracted during interview, but answered majority of the questions. The patient denies depression or other significant mood symptoms.  Specifically, the patient denies manic symptoms, past and present.  The patient denies auditory or visual hallucinations, and no delusions could be elicited on direct questioning.  The patient denies suicidal ideation, homicidal ideation, intent, or plan. Pt denied any acute concerns or current mood symptoms. Pt has been compliant with medications. Pt asked for a dose of Seroquel around 1:30 am, pt received Seroquel 100 mg PO at 3:02 am 9/19/22.     REVIEW OF SYSTEMS:   • Constitutional Symptoms	No complaints  • Eyes	No complaints  • Ears / Nose / Throat / Mouth	No complaints  • Cardiovascular	No complaints  • Respiratory	No complaints  • Gastrointestinal	No complaints  • Genitourinary	No complaints  • Musculoskeletal	No complaints  • Skin	No complaints  • Neurological	No complaints  • Psychiatric (see HPI)	See HPI  • Endocrine	No complaints  • Hematologic / Lymphatic	No complaints  • Allergic / Immunologic	No complaints    REVIEW OF VITALS/LABS/IMAGING/INVESTIGATIONS:   • Vital signs reviewed: Yes  • Vital Signs:	    T(C): 36.4 (09-19-22 @ 07:32), Max: 36.7 (09-18-22 @ 19:42)  HR: 89 (09-19-22 @ 07:32) (85 - 98)  BP: 107/75 (09-19-22 @ 07:32) (107/75 - 161/79)  RR: 19 (09-19-22 @ 07:32) (18 - 19)  SpO2: 97% (09-19-22 @ 07:32) (94% - 97%)    • Available labs reviewed: Yes  • Available Lab Results:                       MEDICATIONS:      PRN Medications:  • PRN Medications since last evaluation	  • PRN Details:  pt received Seroquel 100 mg PO at 3:02 am 9/19/22.     Current Medications:   budesonide  80 MICROgram(s)/formoterol 4.5 MICROgram(s) Inhaler 2 Puff(s) Inhalation two times a day  clonazePAM  Tablet 0.5 milliGRAM(s) Oral two times a day  diphenhydrAMINE Injectable 50 milliGRAM(s) IntraMuscular every 6 hours PRN  haloperidol    Injectable 5 milliGRAM(s) IntraMuscular every 6 hours PRN  LORazepam   Injectable 2 milliGRAM(s) IntraMuscular every 6 hours PRN  QUEtiapine 50 milliGRAM(s) Oral at bedtime     Medication Side Effects:  • Medication Side Effects or Adverse Reactions (new or ongoing)	None known    MENTAL STATUS EXAM:   • Level of Consciousness	Alert  • General Appearance	Well developed  • Body Habitus	Well nourished  • Hygiene	poor  • Grooming	poor  • Behavior	cooperative  • Eye Contact	Good  • Relatedness        fair   • Impulse Control	impaired   • Muscle Tone / Strength	Normal muscle tone/strength  • Abnormal Movements	No abnormal movements  • Gait / Station	Normal gait / station  • Speech Volume	Normal  • Speech Rate	Normal  • Speech Spontaneity	Normal  • Speech Articulation	Normal  • Mood	Normal  • Affect Quality	normal   • Affect Range	Full  • Affect Congruence	Congruent  • Thought Process	disorganized, tangential, flight of ideas   • Thought Associations	loose  • Thought Content	Unremarkable  • Perceptions	No abnormalities  • Oriented to Time	Yes  • Oriented to Place	Yes  • Oriented to Situation	Yes  • Oriented to Person	Yes  • Attention / Concentration	impaired   • Estimated Intelligence	Average  • Recent Memory	Normal  • Remote Memory	Normal  • Fund of Knowledge	Normal  • Language	No abnormalities noted  • Judgment (regarding everyday events)	poor  • Insight (regarding psychiatric illness)	poor     SUICIDALITY:   • Suicidality (Interval)	none known    HOMICIDALITY/AGGRESSION:   • Homicidality/Aggression	none known    DIAGNOSIS DSM-V:    Psychiatric Diagnosis (Corresponds to DSM-IV Axis I, II):   HEALTH ISSUES - PROBLEM Dx:  Psychosis, unspecified psychosis type             Medical Diagnosis (Corresponds to DSM-IV Axis III):  • Axis III	  COPD, HTN    ASSESSMENT OF CURRENT CONDITION:   Summary (include case differential, formulation and patient response to therapy):   Pt was calm, cooperative, and not in any acute distress. Pt had disorganized speech and tangential thought process. Pt appeared distracted upon interview. Pt states she is feeling "wonderful" and that she is "being treated so well here." Pt reports she is open to "getting treated and healthy" and discussed this plan with her boyfriend. The patient denies depression or other significant mood symptoms.  Specifically, the patient denies manic symptoms, past and present.  The patient denies auditory or visual hallucinations, and no delusions could be elicited on direct questioning.  The patient denies suicidal ideation, homicidal ideation, intent, or plan. Pt denied any acute concerns or current mood symptoms. Pt still presented with disorganized speech and behavior. Pt has been compliant with medications.       Risk Assessment (consider static vs modifiable risk factors and protective factors; comment on level of risk for dangerous behavior):   Risk factors: 2 previous SA, unemployed, mood disorder, history of hospitalizations   Protective factors: relationship stability, residential stability   Moderate risk for dangerous behavior    PLAN  Awaiting transfer to inpatient bed . Continue medications.

## 2022-09-19 NOTE — ED CDU PROVIDER SUBSEQUENT DAY NOTE - NS ED ROS FT
Constitutional: no fever  Eyes: no vision changes  ENT: no nasal congestion  CV: no chest pain  Resp: no shortness of breath  GI: no abdominal pain  : no dysuria  MSK: no joint pain  Skin: no rash  Neuro: no focal weakness
Constitutional: no fever  Eyes: no vision changes  ENT: no nasal congestion  CV: no chest pain  Resp: no shortness of breath  GI: no abdominal pain  : no dysuria  MSK: no joint pain  Skin: no rash  Neuro: no focal weakness

## 2022-09-19 NOTE — CHART NOTE - NSCHARTNOTEFT_GEN_A_CORE
SW Note: Plan is to transfer pt for inpt psychiatric care. Reviewed [facility]es and pt is listed with medicare and medicaid coverage. Notified [facility] business office. Contacted [facility] [phone] and referral made pending bed census and MD review.
SWNote: pt needs transfer 9.37 status (completed) , worker called [facility] ([facility]) no bed available this evening. SW to follow in the am .
SW role ongoing for involuntary inpatient psychiatric admission. Patient listed as selfpay, therefore facility options are limited. PATRICIA spoke with HASMUKH Angeles Harley at [facility], [phone] who reports no beds available. PATRICIA spoke with Mary at [facility], [phone] who reports they are not accepting transfers at this time. PATRICIA spoke with Gucci at [facility], [phone] who reports no beds available. Message left for [facility], [phone] to determine bed availability. SW to continue to follow.

## 2022-09-19 NOTE — ED CDU PROVIDER DISPOSITION NOTE - CLINICAL COURSE
no acute events during observation stay; patient transferred to [facility] for psychiatric care

## 2022-09-19 NOTE — ED CDU PROVIDER SUBSEQUENT DAY NOTE - SOCIAL CONCERNS
Complex psychosocial needs/coping issues

## 2022-09-19 NOTE — ED ADULT NURSE REASSESSMENT NOTE - COMFORT CARE
meal provided/plan of care explained/po fluids offered
meal provided/plan of care explained
plan of care explained
meal provided/plan of care explained

## 2022-09-19 NOTE — ED CDU PROVIDER SUBSEQUENT DAY NOTE - NSICDXPASTMEDICALHX_GEN_ALL_CORE_FT
PAST MEDICAL HISTORY:  Bipolar 1 disorder     

## 2022-09-19 NOTE — ED ADULT NURSE REASSESSMENT NOTE - NS ED NURSE REASSESS COMMENT FT1
Assumed care of patient.  Pt alert and cooperative. Pt states that she is feeling "ok".  Pt requesting night meds and informed that she will magda meds later.  Will monitor and chart changes
Pt uncooperative, yelling at staff refusing to get changed into yellow gown and have ecg performed. MD notified. Security at bedside. See eMAR.
awake remains pleasant.  improvement noted.  able to verbalize needs.
c/o headache call place to dr yañez orders received pt medicated with tylenol
pt c/o an increase in anxiety s/p trying to contact her sister. pt educated on calming techniques and dr rhodes made aware. klonopin 0.5mg ordered for break thought anxiety and administered. pt s/p reports feeling less anxious.
sleeping short intervals.
Patient behavior is labile.  Patient exhibits bizarre dress at times wearing towels on her head or sticking napkin in her nose, and tucking her gowns into her underwear.  Patient was pleasant and then throw the remote and then apologized.  Patient redirected from sitting on top of the back of the chair in her room.  No attempts to harm self or others and safety maintained.
Patient educated about ordered medications but would not accept them and making bizarre statements when attempts to clarify patients refusal.  No attempts to harm self or others and safety maintained.  Patient ate 100% of her dinner.
patient to nurses station states unable to go to sleep.  Dr. Veliz notified and additional dose of Seroquel 100 mg given and tolerated well.
Assumed care of patient at 0730.  Patient oriented to staff and educated about plan of care.  Patient provided supplies to shower as requested.  No attempts to harm self or others and safety maintained.
pt is awake and cooperative. no attempts to harm self or others. pt provided sundries for am adl's.

## 2022-09-19 NOTE — ED CDU PROVIDER SUBSEQUENT DAY NOTE - CONSTITUTIONAL APPEARANCE HYGIENE, MLM
Pt comes in via EMS from home with with wife stating that the pt has become more lethargic over the last 2 days.  She states the pt suffered a fall 1 week ago and was seen at an urgent care where he was found to have a right fractured rib.  Pt states pain at that location and SOB.    
agitated/POOR HYGIENE

## 2022-09-19 NOTE — ED CDU PROVIDER DISPOSITION NOTE - NSRECPHYSICIAN_ED_A_ED_FT
Message left for pt to call the office back to inform him of below.     He will need Bactrim DS 1 tab p.o. the day before the biopsy, the day of the biopsy, and the day after the biopsy.  Will also need to be dosed with Rocephin on the day of the biopsy.    Patient did have an appt for a rectal swab scheduled on 1/3, but he has already had this done and does not need to come in for that appointment. It has been cancelled. We will see him on 1/10 for the procedure.    Bucevic

## 2022-09-19 NOTE — ED ADULT NURSE REASSESSMENT NOTE - GENERAL PATIENT STATE
comfortable appearance/cooperative
comfortable appearance/resting/sleeping
comfortable appearance/cooperative/resting/sleeping
comfortable appearance/resting/sleeping
cooperative
comfortable appearance/cooperative
labile
comfortable appearance/cooperative
comfortable appearance/smiling/interactive

## 2022-09-19 NOTE — ED CDU PROVIDER SUBSEQUENT DAY NOTE - HISTORY
AJM: pending bed for 
No acute events overnight. Pending placement.
No acute events overnight. Pending placement.

## 2023-03-24 NOTE — ED BEHAVIORAL HEALTH ASSESSMENT NOTE - NSBHPSYCHOLCOGORIENT_PSY_A_CORE
[Alert] : alert [Sclera] : the sclera and conjunctiva were normal [No Respiratory Distress] : no respiratory distress [None] : no edema [Abdomen Soft] : soft [Normal Color / Pigmentation] : normal skin color and pigmentation [No Focal Deficits] : no focal deficits [Oriented To Time, Place, And Person] : oriented to person, place, and time Oriented to time, place, person, situation

## 2023-08-11 NOTE — PROGRESS NOTE BEHAVIORAL HEALTH - OTHER
Spoke with patient, results and PCP recommendations discussed. Patient verbalized understanding and no further questions at this time.   
not assessed

## 2023-10-16 NOTE — ED ADULT TRIAGE NOTE - NSSEPSISSUSPECTED_ED_A_ED
Received request via: Pharmacy    Was the patient seen in the last year in this department? Yes    Does the patient have an active prescription (recently filled or refills available) for medication(s) requested? No    Does the patient have half-way Plus and need 100 day supply (blood pressure, diabetes and cholesterol meds only)? Yes, quantity updated to 100 days  
No

## 2023-10-30 NOTE — ED BEHAVIORAL HEALTH ASSESSMENT NOTE - NSBHPSYCHOLCOGABN_PSY_A_CORE
Chief Complaint   Patient presents with    Facial Pain     Pt c/o right side lower jaw swelling that began three days ago, pt reports pain in her jaw that has gotten worse over past three days and that her teeth are all intact.      Pt ambulatory to triage for above complaint. Pt reports difficulty with chewing but not swallowing, a month ago pt had bilateral ear irrigation to remove embedded wax. Pt denies fever, chills, or body aches.      Pt is alert/oriented and follows commands. Pt speaking in full sentences and responds appropriately to questions. No acute distress noted in triage and respirations are even and unlabored.     Pt placed in lobby and educated on triage process. Pt encouraged to alert staff for any changes in condition.    
disoriented to time

## 2024-02-15 NOTE — ED BEHAVIORAL HEALTH ASSESSMENT NOTE - PERCEPTIONS
Attempted removal of wax by curette, patient could not tolerate. Will contact ENT for consult. No abnormalities

## 2024-05-03 ENCOUNTER — EMERGENCY (EMERGENCY)
Facility: HOSPITAL | Age: 64
LOS: 1 days | Discharge: OTHER INSTITUTION | End: 2024-05-03
Attending: EMERGENCY MEDICINE
Payer: MEDICARE

## 2024-05-03 VITALS
OXYGEN SATURATION: 98 % | DIASTOLIC BLOOD PRESSURE: 89 MMHG | TEMPERATURE: 98 F | HEART RATE: 108 BPM | SYSTOLIC BLOOD PRESSURE: 142 MMHG | RESPIRATION RATE: 18 BRPM

## 2024-05-03 PROBLEM — F31.9 BIPOLAR DISORDER, UNSPECIFIED: Chronic | Status: ACTIVE | Noted: 2022-09-16

## 2024-05-03 LAB
ALBUMIN SERPL ELPH-MCNC: 4.4 G/DL — SIGNIFICANT CHANGE UP (ref 3.3–5.2)
ALP SERPL-CCNC: 92 U/L — SIGNIFICANT CHANGE UP (ref 40–120)
ALT FLD-CCNC: 16 U/L — SIGNIFICANT CHANGE UP
ANION GAP SERPL CALC-SCNC: 12 MMOL/L — SIGNIFICANT CHANGE UP (ref 5–17)
APAP SERPL-MCNC: <3 UG/ML — LOW (ref 10–26)
AST SERPL-CCNC: 19 U/L — SIGNIFICANT CHANGE UP
BASE EXCESS BLDV CALC-SCNC: 5.9 MMOL/L — HIGH (ref -2–3)
BASOPHILS # BLD AUTO: 0.06 K/UL — SIGNIFICANT CHANGE UP (ref 0–0.2)
BASOPHILS NFR BLD AUTO: 0.3 % — SIGNIFICANT CHANGE UP (ref 0–2)
BILIRUB DIRECT SERPL-MCNC: 0.1 MG/DL — SIGNIFICANT CHANGE UP (ref 0–0.3)
BILIRUB INDIRECT FLD-MCNC: 0.2 MG/DL — SIGNIFICANT CHANGE UP (ref 0.2–1)
BILIRUB SERPL-MCNC: 0.4 MG/DL — SIGNIFICANT CHANGE UP (ref 0.4–2)
BUN SERPL-MCNC: 11.9 MG/DL — SIGNIFICANT CHANGE UP (ref 8–20)
CA-I SERPL-SCNC: 1.17 MMOL/L — SIGNIFICANT CHANGE UP (ref 1.15–1.33)
CALCIUM SERPL-MCNC: 9.5 MG/DL — SIGNIFICANT CHANGE UP (ref 8.4–10.5)
CHLORIDE BLDV-SCNC: 104 MMOL/L — SIGNIFICANT CHANGE UP (ref 96–108)
CHLORIDE SERPL-SCNC: 101 MMOL/L — SIGNIFICANT CHANGE UP (ref 96–108)
CO2 SERPL-SCNC: 28 MMOL/L — SIGNIFICANT CHANGE UP (ref 22–29)
CREAT SERPL-MCNC: 0.7 MG/DL — SIGNIFICANT CHANGE UP (ref 0.5–1.3)
EGFR: 97 ML/MIN/1.73M2 — SIGNIFICANT CHANGE UP
EOSINOPHIL # BLD AUTO: 0 K/UL — SIGNIFICANT CHANGE UP (ref 0–0.5)
EOSINOPHIL NFR BLD AUTO: 0 % — SIGNIFICANT CHANGE UP (ref 0–6)
ETHANOL SERPL-MCNC: <10 MG/DL — SIGNIFICANT CHANGE UP (ref 0–9)
FLUAV AG NPH QL: SIGNIFICANT CHANGE UP
FLUBV AG NPH QL: SIGNIFICANT CHANGE UP
GAS PNL BLDV: 136 MMOL/L — SIGNIFICANT CHANGE UP (ref 136–145)
GAS PNL BLDV: SIGNIFICANT CHANGE UP
GLUCOSE BLDV-MCNC: 118 MG/DL — HIGH (ref 70–99)
GLUCOSE SERPL-MCNC: 113 MG/DL — HIGH (ref 70–99)
HCO3 BLDV-SCNC: 31 MMOL/L — HIGH (ref 22–29)
HCT VFR BLD CALC: 45.3 % — HIGH (ref 34.5–45)
HCT VFR BLDA CALC: 48 % — SIGNIFICANT CHANGE UP
HGB BLD CALC-MCNC: 16 G/DL — SIGNIFICANT CHANGE UP (ref 11.7–16.1)
HGB BLD-MCNC: 15.6 G/DL — HIGH (ref 11.5–15.5)
IMM GRANULOCYTES NFR BLD AUTO: 0.7 % — SIGNIFICANT CHANGE UP (ref 0–0.9)
LACTATE BLDV-MCNC: 1.3 MMOL/L — SIGNIFICANT CHANGE UP (ref 0.5–2)
LYMPHOCYTES # BLD AUTO: 1.1 K/UL — SIGNIFICANT CHANGE UP (ref 1–3.3)
LYMPHOCYTES # BLD AUTO: 6 % — LOW (ref 13–44)
MAGNESIUM SERPL-MCNC: 2 MG/DL — SIGNIFICANT CHANGE UP (ref 1.6–2.6)
MCHC RBC-ENTMCNC: 29.2 PG — SIGNIFICANT CHANGE UP (ref 27–34)
MCHC RBC-ENTMCNC: 34.4 GM/DL — SIGNIFICANT CHANGE UP (ref 32–36)
MCV RBC AUTO: 84.8 FL — SIGNIFICANT CHANGE UP (ref 80–100)
MONOCYTES # BLD AUTO: 0.99 K/UL — HIGH (ref 0–0.9)
MONOCYTES NFR BLD AUTO: 5.4 % — SIGNIFICANT CHANGE UP (ref 2–14)
NEUTROPHILS # BLD AUTO: 16.09 K/UL — HIGH (ref 1.8–7.4)
NEUTROPHILS NFR BLD AUTO: 87.6 % — HIGH (ref 43–77)
PCO2 BLDV: 51 MMHG — HIGH (ref 39–42)
PH BLDV: 7.39 — SIGNIFICANT CHANGE UP (ref 7.32–7.43)
PLATELET # BLD AUTO: 233 K/UL — SIGNIFICANT CHANGE UP (ref 150–400)
PO2 BLDV: 55 MMHG — HIGH (ref 25–45)
POTASSIUM BLDV-SCNC: 4.4 MMOL/L — SIGNIFICANT CHANGE UP (ref 3.5–5.1)
POTASSIUM SERPL-MCNC: 4.3 MMOL/L — SIGNIFICANT CHANGE UP (ref 3.5–5.3)
POTASSIUM SERPL-SCNC: 4.3 MMOL/L — SIGNIFICANT CHANGE UP (ref 3.5–5.3)
PROT SERPL-MCNC: 7.7 G/DL — SIGNIFICANT CHANGE UP (ref 6.6–8.7)
RBC # BLD: 5.34 M/UL — HIGH (ref 3.8–5.2)
RBC # FLD: 13 % — SIGNIFICANT CHANGE UP (ref 10.3–14.5)
RSV RNA NPH QL NAA+NON-PROBE: SIGNIFICANT CHANGE UP
SALICYLATES SERPL-MCNC: <0.6 MG/DL — LOW (ref 10–20)
SAO2 % BLDV: 89.4 % — SIGNIFICANT CHANGE UP
SARS-COV-2 RNA SPEC QL NAA+PROBE: SIGNIFICANT CHANGE UP
SODIUM SERPL-SCNC: 141 MMOL/L — SIGNIFICANT CHANGE UP (ref 135–145)
WBC # BLD: 18.36 K/UL — HIGH (ref 3.8–10.5)
WBC # FLD AUTO: 18.36 K/UL — HIGH (ref 3.8–10.5)

## 2024-05-03 PROCEDURE — 99223 1ST HOSP IP/OBS HIGH 75: CPT

## 2024-05-03 PROCEDURE — 93010 ELECTROCARDIOGRAM REPORT: CPT

## 2024-05-03 PROCEDURE — 90792 PSYCH DIAG EVAL W/MED SRVCS: CPT

## 2024-05-03 NOTE — ED PROVIDER NOTE - CLINICAL SUMMARY MEDICAL DECISION MAKING FREE TEXT BOX
63 yof pmh schizoaffective disorder, multiple previous hospitalization here with 15 zyprexa 15mg and 15 trazodone 50mg overdose. unclear timeline. history of obtained from EMS, reporting she is more depressed. patient not giving history but is awake and alert. denies nausea, vomiting, chest pain, sob, abd pain. Attempted to call alternate phone numbers in chart without success.   Ap - presumed overdose with suicidal intent, will check labs, discuss with tox, cardiac monitoring. constant obs. psych to see once medically cleared 63 yof pmh schizoaffective disorder, multiple previous hospitalization here with 15 zyprexa 15mg and 15 trazodone 50mg overdose. unclear timeline. history of obtained from EMS, reporting she is more depressed. patient not giving history but is awake and alert. denies nausea, vomiting, chest pain, sob, abd pain. Attempted to call alternate phone numbers in chart without success.   Ap - presumed overdose with suicidal intent, will check labs, discuss with tox, cardiac monitoring. constant obs. psych to see once medically cleared  Progress - discussed with poison control, plan to observe 6-8 hours given half life of meds she ingested, monitor for hypotension/seizure. repeat EKG to monitor for qtc prolongation prior to being medically cleared

## 2024-05-03 NOTE — ED ADULT NURSE NOTE - OBJECTIVE STATEMENT
Pt bought in by EMS, took approx 30 pills of olanzipine 15mg and trazadone 50mg. pt responsive to touch. hx of suicide attempts as per EMS. Respirations are equal and unlabored at this time. Pt arousal to voice, speaking incoherent when speaks. Pt connected to cardiac monitor and , HR 98 NSR. Sp02 100% on room air. 1-1 at bedside. Pts belongings secured. Pt in yellow gown. Pt left in position of comfort, wheels of stretcher locked and in the lowest position. Pt refusing to answer majority of RN assessment questions. Pt denies SOB or CP at this time.

## 2024-05-03 NOTE — ED PROVIDER NOTE - OBJECTIVE STATEMENT
63 yof pmh schizoaffective disorder, multiple previous hospitalization here with 15 zyprexa 15mg and 15 trazodone 50mg overdose. unclear timeline. history of obtained from EMS. patient not giving history but is awake and alert. denies nausea, vomiting, chest pain, sob, abd pain. Attempted to call alternate phone numbers in chart without success.

## 2024-05-03 NOTE — ED ADULT NURSE REASSESSMENT NOTE - NS ED NURSE REASSESS COMMENT FT1
rec pt. from day shift. pt. is aaox3 but unaware of why she is in the hospital. refuses to answer question, states she is tired and wants to sleep. co at bedside, safety maintained.

## 2024-05-03 NOTE — ED PROVIDER NOTE - NSICDXPASTMEDICALHX_GEN_ALL_CORE_FT
PAST MEDICAL HISTORY:  Bipolar 1 disorder     COPD (chronic obstructive pulmonary disease)     PTSD (post-traumatic stress disorder)

## 2024-05-03 NOTE — ED CDU PROVIDER INITIAL DAY NOTE - CLINICAL SUMMARY MEDICAL DECISION MAKING FREE TEXT BOX
63 yof pmh schizoaffective disorder, multiple previous hospitalization here with 15 zyprexa 15mg and 15 trazodone 50mg overdose. unclear timeline. history of obtained from EMS, reporting she is more depressed. patient not giving history but is awake and alert. denies nausea, vomiting, chest pain, sob, abd pain. Attempted to call alternate phone numbers in chart without success.   Ap - presumed overdose with suicidal intent, will check labs, discuss with tox, cardiac monitoring. constant obs. psych to see once medically cleared  Progress - discussed with poison control, plan to observe 6-8 hours given half life of meds she ingested. still not able to participate in history. monitor for clinical improvement to talk with psych

## 2024-05-03 NOTE — ED BEHAVIORAL HEALTH NOTE - BEHAVIORAL HEALTH NOTE
Collateral obtained by Pt "jaredmiguel" Castro, with whom Pt lives, [phone].  Castro reports Pt has been severely depressed for > 2 weeks, has not gotten out of bed daytime or nights and refusing to see her doctor.  Pt had psych appt today at 1130 with Provider Anali [facility] [place] and is prescribed Olanzapine, Prozac and Trazodone.  She uses [facility] in [place].  Collateral reports he went to wake Pt today for appt and could not wake her and was somnolent.  He found empty bottle of her Trazodone and Zyprexa which were full yesterday.  Attempted eval in downtime time however Pt somnolent and unable to participate fully in interview.  This is 3rd suicide attempt by OD per qasim.

## 2024-05-03 NOTE — ED ADULT NURSE NOTE - NSFALLHARMRISKINTERV_ED_ALL_ED
Assistance OOB with selected safe patient handling equipment if applicable/Assistance with ambulation/Communicate risk of Fall with Harm to all staff, patient, and family/Monitor gait and stability/Monitor for mental status changes and reorient to person, place, and time, as needed/Move patient closer to nursing station/within visual sight of ED staff/Provide visual cue: red socks, yellow wristband, yellow gown, etc/Reinforce activity limits and safety measures with patient and family/Toileting schedule using arm’s reach rule for commode and bathroom/Use of alarms - bed, stretcher, chair and/or video monitoring/Bed in lowest position, wheels locked, appropriate side rails in place/Call bell, personal items and telephone in reach/Instruct patient to call for assistance before getting out of bed/chair/stretcher/Non-slip footwear applied when patient is off stretcher/Towner to call system/Physically safe environment - no spills, clutter or unnecessary equipment/Purposeful Proactive Rounding/Room/bathroom lighting operational, light cord in reach

## 2024-05-03 NOTE — ED PROVIDER NOTE - PRINCIPAL DIAGNOSIS
Resting      GEN:  NAD.  AOx3   ABD:  S/NT/ND   LLE:  Dressing C/D/I , 5/5 motor, Calf nttp (Bilat), Sensation rossly intact to light touch throughout, 1+ dp/pt pulses, foot perfused    Patient Vitals for the past 24 hrs:   Temp Pulse Resp BP SpO2   04/23/18 1800 98.1 °F (36.7 °C) 90 16 130/82 97 %   04/23/18 1700 98.1 °F (36.7 °C) 90 18 125/80 95 %   04/23/18 1600 98 °F (36.7 °C) 74 18 147/83 95 %   04/23/18 1546 - 85 - (!) 178/101 -   04/23/18 1541 - 69 - (!) 151/92 -   04/23/18 1535 - 73 - 152/87 94 %   04/23/18 1447 97.8 °F (36.6 °C) 74 14 144/83 96 %   04/23/18 1400 - 72 12 148/77 97 %   04/23/18 1345 - 77 16 138/81 98 %   04/23/18 1330 - 71 14 146/80 96 %   04/23/18 1315 - 73 16 134/78 95 %   04/23/18 1305 - 74 17 130/76 95 %   04/23/18 1300 - 79 17 125/73 96 %   04/23/18 1256 98.2 °F (36.8 °C) - - - -   04/23/18 1255 - 83 14 117/76 95 %   04/23/18 1253 98.2 °F (36.8 °C) 82 16 129/70 95 %   04/23/18 1250 - - - 129/70 -   04/23/18 0818 98.3 °F (36.8 °C) 82 16 157/88 96 %       Current Facility-Administered Medications   Medication Dose Route Frequency    [START ON 4/24/2018] levothyroxine (SYNTHROID) tablet 100 mcg  100 mcg Oral ACB    [START ON 4/24/2018] therapeutic multivitamin (THERAGRAN) tablet 1 Tab  1 Tab Oral DAILY    pravastatin (PRAVACHOL) tablet 20 mg  20 mg Oral QHS    0.9% sodium chloride infusion  125 mL/hr IntraVENous CONTINUOUS    sodium chloride 0.9 % bolus infusion 500 mL  500 mL IntraVENous ONCE    [START ON 4/24/2018] sodium chloride (NS) flush 5-10 mL  5-10 mL IntraVENous Q8H    sodium chloride (NS) flush 5-10 mL  5-10 mL IntraVENous PRN    acetaminophen (TYLENOL) tablet 650 mg  650 mg Oral Q6H    oxyCODONE IR (ROXICODONE) tablet 5 mg  5 mg Oral Q3H PRN    naloxone (NARCAN) injection 0.4 mg  0.4 mg IntraVENous PRN    ceFAZolin (ANCEF) 2g IVPB in 50 mL D5W  2 g IntraVENous Q8H    ondansetron (ZOFRAN) injection 4 mg  4 mg IntraVENous Q4H PRN    prochlorperazine (COMPAZINE) with saline injection 5 mg  5 mg IntraVENous Q6H PRN    hydrOXYzine HCl (ATARAX) tablet 10 mg  10 mg Oral Q8H PRN    famotidine (PEPCID) tablet 20 mg  20 mg Oral BID    senna-docusate (PERICOLACE) 8.6-50 mg per tablet 1 Tab  1 Tab Oral BID    [START ON 4/24/2018] polyethylene glycol (MIRALAX) packet 17 g  17 g Oral DAILY    [START ON 4/25/2018] bisacodyl (DULCOLAX) suppository 10 mg  10 mg Rectal DAILY PRN    aspirin delayed-release tablet 325 mg  325 mg Oral BID    ketorolac (TORADOL) injection 30 mg  30 mg IntraVENous Q6H    traMADol (ULTRAM) tablet 50 mg  50 mg Oral Q6H    [START ON 4/24/2018] losartan (COZAAR) tablet 50 mg  50 mg Oral DAILY    And    [START ON 4/24/2018] hydroCHLOROthiazide (HYDRODIURIL) tablet 12.5 mg  12.5 mg Oral DAILY       Lab Results   Component Value Date/Time    HGB 14.8 04/16/2018 08:58 AM    INR 1.0 04/16/2018 08:58 AM       Lab Results   Component Value Date/Time    Sodium 140 04/16/2018 08:58 AM    Potassium 4.1 04/16/2018 08:58 AM    Chloride 104 04/16/2018 08:58 AM    CO2 29 04/16/2018 08:58 AM    BUN 18 04/16/2018 08:58 AM    Creatinine 1.14 04/16/2018 08:58 AM    Calcium 9.1 04/16/2018 08:58 AM            61 y.o. male s/p left total knee arthroplasty on 4/23/2018  . Doing well.        ABX: Complete 24 hours perioperative abx  PATHWAY: Straight cath per protocol if needed  DVT Prophylaxis: Aspirin 325 mg enteric coated  Weight Bearing: WBAT LLE   Pain Control: Diclofenac,  Tylenol, scheduled tramadol, oxy 5 mg for breakthrough, dilaudid IV 0.5 mg for secondary breakthrough  Disposition: Elma Jiménez Suicide ideation

## 2024-05-03 NOTE — ED PROVIDER NOTE - PRO INTERPRETER NEED 2
[FreeTextEntry1] : Diagnosis #1 vague abdominal discomfort in the right lower quadrant.  To see the surgeon who inserted  the peritoneal dialysis catheter and will have abdominal sonogram\par 2.  Hypertension.  Since blood pressure is low to decrease hydralazine to 50 mg p.o. 3 times a day.  CMP to lab\par 3.  Chronic renal insufficiency, CMP to lab\par 4.  Anemia of chronic disease.  CBC to lab.  Followed up by the hematologist as well\par 5.  Hypokalemia, supplemented.  CMP to lab\par 6.  Adult onset diabetes.  A1c to lab\par 7.  Diarrhea better.  Resolved\par 8.  Right flank pain by history and examination musculoskeletal.  Have abdominal sonogram and take Tylenol 500 mg p.o. every 6 hours as needed.\par 9.  History of coronary artery disease, stable.  Followed also by the cardiologist\par Plan.  Return after 1 month.  Total approximately 40 minutes spent English

## 2024-05-03 NOTE — ED ADULT TRIAGE NOTE - CHIEF COMPLAINT QUOTE
took approx 30 pills of olanzipine 15mg and trazadone 50mg. pt responsive to touch. hx of suicide attempts as per EMS

## 2024-05-04 DIAGNOSIS — F25.9 SCHIZOAFFECTIVE DISORDER, UNSPECIFIED: ICD-10-CM

## 2024-05-04 LAB
AMPHET UR-MCNC: NEGATIVE — SIGNIFICANT CHANGE UP
APPEARANCE UR: CLEAR — SIGNIFICANT CHANGE UP
BARBITURATES UR SCN-MCNC: NEGATIVE — SIGNIFICANT CHANGE UP
BENZODIAZ UR-MCNC: NEGATIVE — SIGNIFICANT CHANGE UP
BILIRUB UR-MCNC: NEGATIVE — SIGNIFICANT CHANGE UP
COCAINE METAB.OTHER UR-MCNC: NEGATIVE — SIGNIFICANT CHANGE UP
COLOR SPEC: YELLOW — SIGNIFICANT CHANGE UP
DIFF PNL FLD: NEGATIVE — SIGNIFICANT CHANGE UP
GLUCOSE UR QL: NEGATIVE MG/DL — SIGNIFICANT CHANGE UP
HCT VFR BLD CALC: 43.3 % — SIGNIFICANT CHANGE UP (ref 34.5–45)
HGB BLD-MCNC: 14.7 G/DL — SIGNIFICANT CHANGE UP (ref 11.5–15.5)
KETONES UR-MCNC: ABNORMAL MG/DL
LEUKOCYTE ESTERASE UR-ACNC: NEGATIVE — SIGNIFICANT CHANGE UP
MCHC RBC-ENTMCNC: 28.5 PG — SIGNIFICANT CHANGE UP (ref 27–34)
MCHC RBC-ENTMCNC: 33.9 GM/DL — SIGNIFICANT CHANGE UP (ref 32–36)
MCV RBC AUTO: 84.1 FL — SIGNIFICANT CHANGE UP (ref 80–100)
METHADONE UR-MCNC: NEGATIVE — SIGNIFICANT CHANGE UP
NITRITE UR-MCNC: NEGATIVE — SIGNIFICANT CHANGE UP
OPIATES UR-MCNC: NEGATIVE — SIGNIFICANT CHANGE UP
PCP SPEC-MCNC: SIGNIFICANT CHANGE UP
PCP UR-MCNC: NEGATIVE — SIGNIFICANT CHANGE UP
PH UR: 6.5 — SIGNIFICANT CHANGE UP (ref 5–8)
PLATELET # BLD AUTO: 242 K/UL — SIGNIFICANT CHANGE UP (ref 150–400)
PROT UR-MCNC: SIGNIFICANT CHANGE UP MG/DL
RBC # BLD: 5.15 M/UL — SIGNIFICANT CHANGE UP (ref 3.8–5.2)
RBC # FLD: 13 % — SIGNIFICANT CHANGE UP (ref 10.3–14.5)
SP GR SPEC: 1.02 — SIGNIFICANT CHANGE UP (ref 1–1.03)
THC UR QL: NEGATIVE — SIGNIFICANT CHANGE UP
UROBILINOGEN FLD QL: 1 MG/DL — SIGNIFICANT CHANGE UP (ref 0.2–1)
WBC # BLD: 10.6 K/UL — HIGH (ref 3.8–10.5)
WBC # FLD AUTO: 10.6 K/UL — HIGH (ref 3.8–10.5)

## 2024-05-04 PROCEDURE — 93010 ELECTROCARDIOGRAM REPORT: CPT

## 2024-05-04 PROCEDURE — 90792 PSYCH DIAG EVAL W/MED SRVCS: CPT | Mod: 2W

## 2024-05-04 PROCEDURE — 99233 SBSQ HOSP IP/OBS HIGH 50: CPT

## 2024-05-04 NOTE — ED BEHAVIORAL HEALTH PROGRESS NOTE - SUMMARY
Patient is at high risk of harm to self, given prior suicide attempts and concern for suicide attempt leading to current presentation. Risk is mitigated in inpatient setting; denies current active SI. 
Patient is at high risk of harm to self, given prior suicide attempts and concern for suicide attempt leading to current presentation. Risk is mitigated in inpatient setting; denies current active SI.

## 2024-05-04 NOTE — ED BEHAVIORAL HEALTH PROGRESS NOTE - RISK ASSESSMENT
Patient is at low risk of harm to others. No known hx violence, denies HI.
Patient is at low risk of harm to others. No known hx violence, denies HI.

## 2024-05-04 NOTE — ED BEHAVIORAL HEALTH PROGRESS NOTE - DETAILS:
Patient was seen approx 10 hrs after initial presentation, initially somnolent but awakens to verbal stimuli. She is AOx2 (name, place) and disoriented to situation; has slurred speech and overall poorly uncooperative, with difficulty comprehending simple commands from staff. States that she was woken up suddenly and brought to the ED, denies overdosing on medications, declines to answer questions asked about events of today. She spontaneously shares that she takes her medication daily as directed. Patient refuses to engage in further interview, appears confused and attempting to leave the bed - however nursing staff notes that she is a fall risk (seen slumping over).

## 2024-05-04 NOTE — ED BEHAVIORAL HEALTH PROGRESS NOTE - PRN MEDS RECIEVED IN ED DETAILS FREE TEXT
Patient received Haldol 5 mg IM, Ativan 2 mg IM, Benadryl 50 mg IM at 3pm, 5/3/2024 (downtime)
Patient received Haldol 5 mg IM, Ativan 2 mg IM, Benadryl 50 mg IM at 3pm, 5/3/2024 (downtime)

## 2024-05-04 NOTE — ED BEHAVIORAL HEALTH NOTE - BEHAVIORAL HEALTH NOTE
Patient re-evaluated for mental status. She presents without confusion, oriented to person, place, time, situation. She is able to state that the reason why she overdosed was related to depressed mood and loneliness. She states that with Mother's Day coming up, she misses her mother greatly and feels very down. She is guarded when speaking about overdose of zyprexa and shows limited insight to danger of her actions.     Patient understands she will be admitted to an inpatient unit for further treatment, stabilization and safety. She is cooperative. Patient re-evaluated for mental status. She presents without confusion, oriented to person, place, time, situation. She is able to state that the reason why she overdosed was related to depressed mood and loneliness. She states that with Mother's Day coming up, she misses her mother greatly and feels very down. She is guarded when speaking about overdose of zyprexa and shows limited insight to danger of her actions, asking to return home.     Patient understands she will be admitted to an inpatient unit for further treatment, stabilization and safety. She is cooperative.

## 2024-05-04 NOTE — ED CDU PROVIDER SUBSEQUENT DAY NOTE - CLINICAL SUMMARY MEDICAL DECISION MAKING FREE TEXT BOX
63 yof pmh schizoaffective disorder, multiple previous hospitalization here with 15 zyprexa 15mg and 15 trazodone 50mg overdose. unclear timeline. No acute overnight events. Pt somnolent when evaluated by telepysch and will require reevaluation in the morning.    GEN - NAD  HEAD - NC/AT   EYES- PERRL, EOMI  ENT: Airway patent, mmm  PULMONARY - CTA b/l, symmetric breath sounds. No W/R/R.  CARDIAC -s1s2, RRR, no M,G,R, No JVD  ABDOMEN - +BS, ND, NT, soft, no guarding, no rebound, no masses , no rigidity  : No CVA TTP, no suprapubic TTP  BACK - Normal  spine   EXTREMITIES - FROM, symmetric pulses, capillary refill < 2 seconds, no edema, 5/5 strength in b/l UE and LE  SKIN - no rash or bruising

## 2024-05-04 NOTE — ED BEHAVIORAL HEALTH PROGRESS NOTE - DETAILS
Patient denies SI and declines to provide further information about prior SAs. Denies recent suicide attempt, though  reports concern about missing medications. Patient has at least two prior suicide attempts.

## 2024-05-04 NOTE — ED ADULT NURSE REASSESSMENT NOTE - NS ED NURSE REASSESS COMMENT FT1
Assumed patient care at 7:00 PM. Patient disorganized, patient is oriented and was able to say she is in [place]. Patient unable to answer any questions and was indifferent. Patient seems to be somnolent, falling to sleep and wake up at time. Patient is on hold for substance metabolism. Patient is calm and cooperative, no safety concerns. Assumed patient care at 7:00 PM. Patient disorganized, patient is oriented and was able to say she is in [place]. Patient unable to answer any questions and was indifferent. Patient seems to be somnolent, falling to sleep and wake up at time. Patient is involuntary transfer. Patient is calm and cooperative, no safety concerns.

## 2024-05-04 NOTE — ED BEHAVIORAL HEALTH PROGRESS NOTE - COLLATERAL INFORMATION (NAME, PHONE, RELATIONSHIP):
Friend - Fredy, fiance - [phone]  Patient has been depressed for some time, laying in the bed most of the day, amotivated, requires a lot of encouragement to take care of self and prepare meals. Patient has a hx of being paranoid, suspicious, but has not presented this way recently, rather, she is depressed. She had an appt with her psychiatrist outpatient yesterday but did not attend. When her fiance went to check on her, he noticed that she was not responsive, breathing unusually, called 911. He checked pt's medications and saw that she had two empty vials of medication (zyprexa and trazodone). He is concerned that she overdosed intentionally as she had unknown but high amount of medication in the vials two days ago. Patient has been depressed since December when her dog . She has had questionable medication adherence per qasim. Of not, patient had asked to go to the hospital 2 days ago but was going to wait for outpatient care, appeared stable to wait. Currently on prozac 10 mg, zyprexa 15 mg and tazodone 50 mg.

## 2024-05-04 NOTE — ED ADULT NURSE REASSESSMENT NOTE - NS ED NURSE REASSESS COMMENT FT1
STEPHEN Lopez from poison controlled called stating that at this point pt is medically cleared. MD Block made aware.

## 2024-05-04 NOTE — ED CDU PROVIDER DISPOSITION NOTE - CLINICAL COURSE
63 yof pmh schizoaffective disorder, multiple previous hospitalization here with 15 zyprexa 15mg and 15 trazodone 50mg overdose. unclear timeline. history of obtained from EMS. patient not giving history but is awake and alert. denies nausea, vomiting, chest pain, sob, abd pain. Attempted to call alternate phone numbers in chart without success.  seen by psych and accepted for involuntary admission to [facility] under Dr. Gutierrez

## 2024-05-04 NOTE — CHART NOTE - NSCHARTNOTEFT_GEN_A_CORE
[place]: pt's EDMD (Arsalan) informed about pt's acceptance at [facility]  ,understood . [facility] called ([facility]) danelle arranged ,ETA first available.

## 2024-05-04 NOTE — ED ADULT NURSE REASSESSMENT NOTE - NS ED NURSE REASSESS COMMENT FT1
RN called at this time for report. Pt A+Ox4, respirations are equal and unlabored at this time. Pt denies pain. Negative CP, SOB, ABD pain, or HA. Security called to bedside to transfer PT to . Belongings taken from locker. 1-1 at bedside.

## 2024-05-04 NOTE — ED BEHAVIORAL HEALTH PROGRESS NOTE - PSYCHIATRIC ISSUES AND PLAN (INCLUDE STANDING AND PRN MEDICATION)
continue home medications, prozac 10 mg, zyprexa 15 mg and tazodone 50 mg  PRNs  - benadryl 50 mg PRN q6h for agitation, anxiety  - zyprexa 5 PRN q6h for agitation  can escalate to IM if needed

## 2024-05-04 NOTE — CHART NOTE - NSCHARTNOTEFT_GEN_A_CORE
SWNote: per Telepsych (Gen) [facility] reviewing and requested repeat CBC. Worker made MD aware (Dr Beckham) will f/u  .

## 2024-05-04 NOTE — ED BEHAVIORAL HEALTH PROGRESS NOTE - CASE SUMMARY/FORMULATION (CLEARLY DOCUMENT RATIONALE FOR DISPOSITION CHANGE)
63-year-old woman, PPH of schizoaffective disorder, history of suicide attempts, initially presented for somnolence, with  concerned that this is 2/2  ingestion of many zyprexa and trazodone tablets in overdose attempt. On eval, patient appears confused and is overall limited ability to engage meaningfully in interview. Denies recent suicide attempts, denies current SI, however unable to provide any information about past psychiatric history.  expresses concern that patient may have overdosed on her home medications given pills missing, reports hx 2 prior suicide attempts. Vitals normal, labs with some derangements (elevated venous blood gases). Current presentation is concerning for suicide attempt, per chart review, prior psychotic episodes present with more delusional thought processes. Patient would benefit from inpatient admission for stabilization and safety based on collateral. 
63-year-old woman, PPH of schizoaffective disorder, history of suicide attempts, initially presented for somnolence, with  concerned that this is 2/2  ingestion of many zyprexa and trazodone tablets in overdose attempt. On eval, patient appears confused and is overall unable to engage meaningfully in interview. Denies recent suicide attempts, denies current SI, however unable to provide any information about past psychiatric history.  expresses concern that patient may have overdosed on her home medications given pills missing, reports hx 2 prior suicide attempts. Vitals normal, labs with some derangements (elevated venous blood gases). Current presentation is concerning for delirium, per chart review, prior psychotic episodes present with more delusional thought processes. Patient is not psychiatrically cleared given concern for recent suicide attempt; still need for more meaningful interview. Discussed with ED team, including consideration for medical admission given patient's current level of confusion and significant fall risk.

## 2024-05-04 NOTE — ED ADULT NURSE REASSESSMENT NOTE - NS ED NURSE REASSESS COMMENT FT1
Report given to ambulance and [facility] to nurse Celena, patient is awaiting for transfer.

## 2024-05-04 NOTE — ED ADULT NURSE REASSESSMENT NOTE - NS ED NURSE REASSESS COMMENT FT1
Pt A+Ox4. Monitor tech called stating pts HR in the 120s. Pt denies SOB or CP at this time. MD made aware.

## 2024-05-05 ENCOUNTER — INPATIENT (INPATIENT)
Facility: HOSPITAL | Age: 64
LOS: 4 days | Discharge: HOME | DRG: 192 | End: 2024-05-10
Attending: FAMILY MEDICINE | Admitting: FAMILY MEDICINE
Payer: MEDICARE

## 2024-05-05 VITALS
WEIGHT: 190.04 LBS | HEART RATE: 97 BPM | TEMPERATURE: 98 F | RESPIRATION RATE: 16 BRPM | OXYGEN SATURATION: 93 % | HEIGHT: 70 IN | SYSTOLIC BLOOD PRESSURE: 109 MMHG | DIASTOLIC BLOOD PRESSURE: 77 MMHG

## 2024-05-05 VITALS
SYSTOLIC BLOOD PRESSURE: 143 MMHG | HEART RATE: 102 BPM | DIASTOLIC BLOOD PRESSURE: 84 MMHG | OXYGEN SATURATION: 94 % | RESPIRATION RATE: 19 BRPM | TEMPERATURE: 98 F

## 2024-05-05 LAB
ALBUMIN SERPL ELPH-MCNC: 4.3 G/DL — SIGNIFICANT CHANGE UP (ref 3.3–5.2)
ALP SERPL-CCNC: 88 U/L — SIGNIFICANT CHANGE UP (ref 40–120)
ALT FLD-CCNC: 25 U/L — SIGNIFICANT CHANGE UP
ANION GAP SERPL CALC-SCNC: 10 MMOL/L — SIGNIFICANT CHANGE UP (ref 5–17)
AST SERPL-CCNC: 32 U/L — HIGH
BASE EXCESS BLDV CALC-SCNC: 3.9 MMOL/L — HIGH (ref -2–3)
BASOPHILS # BLD AUTO: 0.08 K/UL — SIGNIFICANT CHANGE UP (ref 0–0.2)
BASOPHILS NFR BLD AUTO: 0.8 % — SIGNIFICANT CHANGE UP (ref 0–2)
BILIRUB SERPL-MCNC: 0.5 MG/DL — SIGNIFICANT CHANGE UP (ref 0.4–2)
BUN SERPL-MCNC: 13.7 MG/DL — SIGNIFICANT CHANGE UP (ref 8–20)
CA-I SERPL-SCNC: 1.12 MMOL/L — LOW (ref 1.15–1.33)
CALCIUM SERPL-MCNC: 9.2 MG/DL — SIGNIFICANT CHANGE UP (ref 8.4–10.5)
CHLORIDE BLDV-SCNC: 102 MMOL/L — SIGNIFICANT CHANGE UP (ref 96–108)
CHLORIDE SERPL-SCNC: 102 MMOL/L — SIGNIFICANT CHANGE UP (ref 96–108)
CO2 SERPL-SCNC: 27 MMOL/L — SIGNIFICANT CHANGE UP (ref 22–29)
CREAT SERPL-MCNC: 0.9 MG/DL — SIGNIFICANT CHANGE UP (ref 0.5–1.3)
EGFR: 72 ML/MIN/1.73M2 — SIGNIFICANT CHANGE UP
EOSINOPHIL # BLD AUTO: 0.12 K/UL — SIGNIFICANT CHANGE UP (ref 0–0.5)
EOSINOPHIL NFR BLD AUTO: 1.1 % — SIGNIFICANT CHANGE UP (ref 0–6)
GAS PNL BLDV: 137 MMOL/L — SIGNIFICANT CHANGE UP (ref 136–145)
GAS PNL BLDV: SIGNIFICANT CHANGE UP
GAS PNL BLDV: SIGNIFICANT CHANGE UP
GLUCOSE BLDV-MCNC: 234 MG/DL — HIGH (ref 70–99)
GLUCOSE SERPL-MCNC: 99 MG/DL — SIGNIFICANT CHANGE UP (ref 70–99)
HCO3 BLDV-SCNC: 28 MMOL/L — SIGNIFICANT CHANGE UP (ref 22–29)
HCT VFR BLD CALC: 44.1 % — SIGNIFICANT CHANGE UP (ref 34.5–45)
HCT VFR BLDA CALC: 44 % — SIGNIFICANT CHANGE UP
HGB BLD CALC-MCNC: 14.8 G/DL — SIGNIFICANT CHANGE UP (ref 11.7–16.1)
HGB BLD-MCNC: 14.6 G/DL — SIGNIFICANT CHANGE UP (ref 11.5–15.5)
IMM GRANULOCYTES NFR BLD AUTO: 0.6 % — SIGNIFICANT CHANGE UP (ref 0–0.9)
LACTATE BLDV-MCNC: 2.6 MMOL/L — HIGH (ref 0.5–2)
LYMPHOCYTES # BLD AUTO: 2.84 K/UL — SIGNIFICANT CHANGE UP (ref 1–3.3)
LYMPHOCYTES # BLD AUTO: 27 % — SIGNIFICANT CHANGE UP (ref 13–44)
MCHC RBC-ENTMCNC: 28.3 PG — SIGNIFICANT CHANGE UP (ref 27–34)
MCHC RBC-ENTMCNC: 33.1 GM/DL — SIGNIFICANT CHANGE UP (ref 32–36)
MCV RBC AUTO: 85.6 FL — SIGNIFICANT CHANGE UP (ref 80–100)
MONOCYTES # BLD AUTO: 0.92 K/UL — HIGH (ref 0–0.9)
MONOCYTES NFR BLD AUTO: 8.8 % — SIGNIFICANT CHANGE UP (ref 2–14)
NEUTROPHILS # BLD AUTO: 6.49 K/UL — SIGNIFICANT CHANGE UP (ref 1.8–7.4)
NEUTROPHILS NFR BLD AUTO: 61.7 % — SIGNIFICANT CHANGE UP (ref 43–77)
NT-PROBNP SERPL-SCNC: 135 PG/ML — SIGNIFICANT CHANGE UP (ref 0–300)
PCO2 BLDV: 45 MMHG — HIGH (ref 39–42)
PH BLDV: 7.41 — SIGNIFICANT CHANGE UP (ref 7.32–7.43)
PLATELET # BLD AUTO: 238 K/UL — SIGNIFICANT CHANGE UP (ref 150–400)
PO2 BLDV: 81 MMHG — HIGH (ref 25–45)
POTASSIUM BLDV-SCNC: 4 MMOL/L — SIGNIFICANT CHANGE UP (ref 3.5–5.1)
POTASSIUM SERPL-MCNC: 4 MMOL/L — SIGNIFICANT CHANGE UP (ref 3.5–5.3)
POTASSIUM SERPL-SCNC: 4 MMOL/L — SIGNIFICANT CHANGE UP (ref 3.5–5.3)
PROT SERPL-MCNC: 7.3 G/DL — SIGNIFICANT CHANGE UP (ref 6.6–8.7)
RBC # BLD: 5.15 M/UL — SIGNIFICANT CHANGE UP (ref 3.8–5.2)
RBC # FLD: 13 % — SIGNIFICANT CHANGE UP (ref 10.3–14.5)
SAO2 % BLDV: 97.6 % — SIGNIFICANT CHANGE UP
SODIUM SERPL-SCNC: 139 MMOL/L — SIGNIFICANT CHANGE UP (ref 135–145)
TROPONIN T, HIGH SENSITIVITY RESULT: 13 NG/L — SIGNIFICANT CHANGE UP (ref 0–51)
WBC # BLD: 10.51 K/UL — HIGH (ref 3.8–10.5)
WBC # FLD AUTO: 10.51 K/UL — HIGH (ref 3.8–10.5)

## 2024-05-05 PROCEDURE — 71045 X-RAY EXAM CHEST 1 VIEW: CPT | Mod: 26

## 2024-05-05 PROCEDURE — 99285 EMERGENCY DEPT VISIT HI MDM: CPT

## 2024-05-05 RX ORDER — MAGNESIUM SULFATE 500 MG/ML
2 VIAL (ML) INJECTION ONCE
Refills: 0 | Status: COMPLETED | OUTPATIENT
Start: 2024-05-05 | End: 2024-05-05

## 2024-05-05 RX ORDER — ALPRAZOLAM 0.25 MG
0.5 TABLET ORAL ONCE
Refills: 0 | Status: DISCONTINUED | OUTPATIENT
Start: 2024-05-05 | End: 2024-05-05

## 2024-05-05 RX ORDER — ONDANSETRON 8 MG/1
4 TABLET, FILM COATED ORAL ONCE
Refills: 0 | Status: COMPLETED | OUTPATIENT
Start: 2024-05-05 | End: 2024-05-05

## 2024-05-05 RX ORDER — SODIUM CHLORIDE 9 MG/ML
1000 INJECTION INTRAMUSCULAR; INTRAVENOUS; SUBCUTANEOUS ONCE
Refills: 0 | Status: COMPLETED | OUTPATIENT
Start: 2024-05-05 | End: 2024-05-05

## 2024-05-05 RX ORDER — FAMOTIDINE 10 MG/ML
20 INJECTION INTRAVENOUS ONCE
Refills: 0 | Status: COMPLETED | OUTPATIENT
Start: 2024-05-05 | End: 2024-05-05

## 2024-05-05 RX ORDER — IPRATROPIUM/ALBUTEROL SULFATE 18-103MCG
3 AEROSOL WITH ADAPTER (GRAM) INHALATION ONCE
Refills: 0 | Status: COMPLETED | OUTPATIENT
Start: 2024-05-05 | End: 2024-05-05

## 2024-05-05 RX ORDER — NICOTINE POLACRILEX 2 MG
1 GUM BUCCAL ONCE
Refills: 0 | Status: COMPLETED | OUTPATIENT
Start: 2024-05-05 | End: 2024-05-05

## 2024-05-05 RX ORDER — IPRATROPIUM/ALBUTEROL SULFATE 18-103MCG
3 AEROSOL WITH ADAPTER (GRAM) INHALATION
Refills: 0 | Status: COMPLETED | OUTPATIENT
Start: 2024-05-05 | End: 2024-05-05

## 2024-05-05 RX ADMIN — FAMOTIDINE 20 MILLIGRAM(S): 10 INJECTION INTRAVENOUS at 20:56

## 2024-05-05 RX ADMIN — Medication 150 GRAM(S): at 06:06

## 2024-05-05 RX ADMIN — Medication 0.5 MILLIGRAM(S): at 10:55

## 2024-05-05 RX ADMIN — ONDANSETRON 4 MILLIGRAM(S): 8 TABLET, FILM COATED ORAL at 20:56

## 2024-05-05 RX ADMIN — Medication 3 MILLILITER(S): at 20:56

## 2024-05-05 RX ADMIN — SODIUM CHLORIDE 1000 MILLILITER(S): 9 INJECTION INTRAMUSCULAR; INTRAVENOUS; SUBCUTANEOUS at 20:56

## 2024-05-05 RX ADMIN — Medication 40 MILLIGRAM(S): at 20:56

## 2024-05-05 RX ADMIN — Medication 3 MILLILITER(S): at 06:10

## 2024-05-05 RX ADMIN — Medication 3 MILLILITER(S): at 06:01

## 2024-05-05 RX ADMIN — Medication 40 MILLIGRAM(S): at 23:25

## 2024-05-05 RX ADMIN — Medication 125 MILLIGRAM(S): at 06:01

## 2024-05-05 RX ADMIN — Medication 3 MILLILITER(S): at 06:11

## 2024-05-05 NOTE — ED PROVIDER NOTE - CLINICAL SUMMARY MEDICAL DECISION MAKING FREE TEXT BOX
63-year-old female history of  PPH of schizoaffective disorder, history of suicide attempts  initially presented to the ED concerning for overdose attempt.  Patient was medically cleared and sent to [facility].  Patient with history of COPD, on inhalers, active smoker.  Patient was found hypoxic 90 to 92% at [facility] and unable to accommodate patient requiring oxygen and sent back to the ED for further pulmonary evaluation.  No chest pain or shortness of breath      Patient found to have bilateral wheezing.  Story consistent with COPD exacerbation.  93% on room air, does not require oxygen.  Will give new DuoNebs x 3, Solu-Medrol, magnesium to treat for wheezing.  Will get chest x-ray, labs.  if oxygen stable after treatment patient will need to see social work for psych placement back at [facility] or elsewhere.

## 2024-05-05 NOTE — ED PROVIDER NOTE - NS ED ROS FT
CONSTITUTIONAL - no  fever, no diaphoresis, no weight change  SKIN - no rash  HEMATOLOGIC - no bleeding, no bruising  EYES - no eye pain, no blurred vision  ENT - no change in hearing, no pain  RESPIRATORY - no shortness of breath, no cough  CARDIAC - no chest pain, no palpitations  GI - no abd pain, no nausea, no vomiting, no diarrhea, no constipation, no bleeding  GENITO-URINARY - no discharge, no dysuria; no hematuria,   ENDO - no polydipsia, no polyuria, no heat/no cold intolerance  MUSCULOSKELETAL - no joint pain, no swelling, no redness  NEUROLOGIC - no weakness, no headache, no anesthesia, no paresthesias  PSYCH - no anxiety, non suicidal, non homicidal, no hallucination, no depression

## 2024-05-05 NOTE — ED PROVIDER NOTE - CARE PLAN
Principal Discharge DX:	Schizoaffective disorder   1 Principal Discharge DX:	Schizoaffective disorder  Secondary Diagnosis:	COPD exacerbation

## 2024-05-05 NOTE — ED PROVIDER NOTE - PROGRESS NOTE
Ms. Wick said she hasn't had period this month but it was coming.  She will come in for pregnancy test after her period.   Stable.

## 2024-05-05 NOTE — ED ADULT NURSE REASSESSMENT NOTE - NS ED NURSE REASSESS COMMENT FT1
Assumed care from STEPHEN Hussein at 0720, patient A&Ox3 resting in bed, VSS, SPo2 92 % on RA, continues to be on constant observation, No SI or HI at this time.  Awaiting CXR results.

## 2024-05-05 NOTE — ED ADULT NURSE NOTE - CHIEF COMPLAINT QUOTE
[facility] after being sent from [facility] for evaluation.  Patient d/c to [facility] tonight from  for involuntary admission.  Upon arrival, pt o2 sat 90-92% RA.  Hx of COPD

## 2024-05-05 NOTE — ED ADULT TRIAGE NOTE - CHIEF COMPLAINT QUOTE
[facility] after being sent from [facility] for evaluation.  Patient d/c to [facility] tonight from  for involuntary admission.  Upon arrival, pt o2 sat 90-92% RA.  Hx of COPD

## 2024-05-05 NOTE — ED ADULT NURSE REASSESSMENT NOTE - NS ED NURSE REASSESS COMMENT FT1
Patient A&Ox3, awake but restless, anxious, unlabored even breathing noted b/l on RA,  no SI or HI at this time, MD Allan notified of anxiety, medications given as per orders. Awaiting CXR results.

## 2024-05-05 NOTE — ED PROVIDER NOTE - IV ALTEPLASE EXCL ABS HIDDEN
[facility] Progress Note    Stormy Woo Patient Status:  Inpatient    1952 MRN [id number]   Location [facility] [address] Attending Uriah Woo MD   Hosp Day # 3 PCP Sam Guillermo MD     Subjective:  Stormy Woo is a(n) 70 year old female followed by [facility], presents to the ED with c/o increasing leg edema over the last 2 weeks, weakness and worsening dyspnea. She was found to be in Afib with RVR  in the office and hence she was sent to the ED for admission and further work up   D.Dimer: 2.73  BNP: 1,566  CTA: No pulmonary embolus. Bilateral pleural effusions with trace pericardial effusion and ground glass opacities. New from the prior study. Suspicious for edema. Superimposed infiltrates difficult to exclude     Remote h/o hyperthyroid - never treated per daughter, but now with hypothyroid on replacement therapy        Hospital course to date:   2023 Admitted, cardiology consulted, Cardizem drip started  1/3/2023 Cardizem drip infusing, pending Echo  2023 Pending cardiac cath, Cardizem drip on hold per cardiology   2023 Remains in afib - HR up to 130's, Pending: RUPAL/CCV today, IV diureses ordered, rising Wbc: 12.9      Procedure(s) Performed:   2024 Cardiac cath   No angiographic evidence of CAD  Elevated filling pressures, LVEDP 22 mmHg  RHC:  RA: 22  RV: 43/15  PA: 37/22/30  PCWP: 19/21/18  CO: 3.9 L/min  CI: 2.4 L/min/m2    1/3/2023 TTE:   1.  Procedure narrative: A transthoracic echocardiogram was performed. Image      quality was good. The study was technically limited due to poor acoustic      window availability. Intravenous contrast (Definity 2ml) was administered      to opacify the left ventricle.  2.  Left ventricle: The cavity size is mildly dilated. Wall thickness is      normal. Systolic function is severely reduced. The ejection fraction was      measured by biplane method of disks. There is severe diffuse hypokinesis.      Left  ventricular diastolic function parameters are indeterminate at      present time. The ejection fraction is 24%.  3.  Ascending aorta: The vessel is dilated and 3.7cm diameter.  4.  Mitral valve: There is moderate to severe regurgitation.  5.  Left atrium: The atrium is moderately dilated.  6.  Right ventricle: The cavity size is normal. Wall thickness is normal. The      TAPSE is reduced, suggestive of RV systolic dysfunction. Systolic pressure      is moderately increased. The estimated peak pressure is 61mm Hg.  7.  Right atrium: The atrium is dilated.  8.  Tricuspid valve: There is moderate regurgitation.  9.  Pulmonic valve: There is mild regurgitation.  10. Pulmonary arteries: Systolic pressure is moderately to severely increased,      estimated to be 61mm Hg.  11. Inferior vena cava: The IVC is dilated.  12. Pericardium, extracardiac: There is no pericardial effusion. There is a      right pleural effusion and a left pleural effusion.  13. Atrial fibrillation.    Current complaints: just completed cardiac cath     Objective:  General Appearance:    Alert, cooperative, no distress, collaborative MD translates    Head:    Normocephalic   Eyes:    Pupils equal   Ears:    Normal external ears   Nose:   Nares normal   Throat:   Lips moist   Neck:   Supple   Back:       Lungs:     Clear to auscultation bilaterally, respirations unlabored   Chest Wall:    No tenderness or deformity    Heart:    Irregular rate and rhythm, S1 and S2 normal   Breast Exam:      Abdomen:     Soft, non-tender, bowel sounds active all four quadrants,     no masses, no organomegaly   Genitalia:      Rectal:      Extremities:   Extremities normal, atraumatic, no cyanosis or edema   Pulses:   2+ and symmetric all extremities   Skin:   Skin color, texture, turgor normal, no rashes or lesions   Lymph nodes:   Cervical, supraclavicular, and axillary nodes normal             Labs:     Recent Labs   Lab 01/05/23  0421 01/04/23  0416 01/03/23  0422  23   WBC 12.9* 10.0 8.1 8.8   HGB 12.6 12.4 13.0 14.0   MCV 85.1 82.8 85.2 86.7    246 257 274   INR  --   --   --  1.1       Recent Labs   Lab 23  0421 23  0416 23  0422 23   CO2 28 26  --  25   BUN 7 5*  --  8   MG 2.0 2.1 2.4 1.9       Recent Labs   Lab 23  0421 23  0416 23   AST 21 20 33       Invalid input(s): PGLU      Radiology:  XR CHEST PA OR AP 1 VIEW    Result Date: 2023  Narrative: EXAM: XR CHEST AP OR PA CLINICAL INDICATION: Shortness of breath COMPARISON: 2021 FINDINGS: Cardiomegaly with edema and small right effusion. New from the prior examination.  Arthritic changes in the spine.     Impression:  Cardiomegaly with edema and small right effusion. Electronically Signed by: LAURA VILLEGAS M.D. Signed on: 2023 8:24 PM     TRANSTHORACIC ECHO (TTE) COMPLETE W/ W/O IMAGING AGENT    Impression: *[facility]* [address] [place] [id number] [phone] Transthoracic Echocardiogram (TTE) Patient: Stormy Woo      Study Date/Time:     [place] 3 2023 1:47PM MRN:     [id number]                  FIN#:                [id number] :     1952               Ht/Wt:               152.4cm 67.6kg Age:     70                       BSA/BMI:             1.72m^2 29.1kg/m^2 Gender:  F                        Baseline BP:         109 / 70 Ordering Physician:   Lisa Becerra  Referring Physician:  Lisa Becerra  Attending Physician:  Uriah Woo Performing Physician: AMG Diagnostic Physician: Dakota Rizo MD Sonographer:          Nicole Duran RDCS  ------------------------------------------------------------------------------ INDICATIONS:   Atrial fibrillation. ------------------------------------------------------------------------------ STUDY CONCLUSIONS SUMMARY: 1.  Procedure narrative: A transthoracic echocardiogram was performed. Image     quality was good. The study was  technically limited due to poor acoustic     window availability. Intravenous contrast (Definity 2ml) was administered     to opacify the left ventricle. 2.  Left ventricle: The cavity size is mildly dilated. Wall thickness is     normal. Systolic function is severely reduced. The ejection fraction was     measured by biplane method of disks. There is severe diffuse hypokinesis.     Left ventricular diastolic function parameters are indeterminate at     present time. The ejection fraction is 24%. 3.  Ascending aorta: The vessel is dilated and 3.7cm diameter. 4.  Mitral valve: There is moderate to severe regurgitation. 5.  Left atrium: The atrium is moderately dilated. 6.  Right ventricle: The cavity size is normal. Wall thickness is normal. The     TAPSE is reduced, suggestive of RV systolic dysfunction. Systolic pressure     is moderately increased. The estimated peak pressure is 61mm Hg. 7.  Right atrium: The atrium is dilated. 8.  Tricuspid valve: There is moderate regurgitation. 9.  Pulmonic valve: There is mild regurgitation. 10. Pulmonary arteries: Systolic pressure is moderately to severely increased,     estimated to be 61mm Hg. 11. Inferior vena cava: The IVC is dilated. 12. Pericardium, extracardiac: There is no pericardial effusion. There is a     right pleural effusion and a left pleural effusion. 13. Atrial fibrillation. ------------------------------------------------------------------------------ STUDY DATA:  [place]  Procedure:  A transthoracic echocardiogram was performed. Image quality was good. The study was technically limited due to poor acoustic window availability. Intravenous contrast (Definity 2ml) was administered to opacify the left ventricle.  M-mode, complete 2D, complete spectral Doppler, color Doppler, and intravenous contrast injection.  Study status:  Routine.  Study completion:  There were no complications. FINDINGS LEFT VENTRICLE:  The cavity size is mildly dilated. Wall  thickness is normal. Systolic function is severely reduced. There is severe diffuse hypokinesis. Unable to accurately assess left ventricular diastolic function parameters due to atrial fibrillation.    The ejection fraction was measured by biplane method of disks. The ejection fraction is 24%. Left ventricular diastolic function parameters are indeterminate at present time. Doppler parameters are consistent with both elevated ventricular end-diastolic filling pressure and elevated left atrial filling pressure. AORTIC VALVE:  The annulus is mildly calcified. The valve is trileaflet. The leaflets are mildly thickened and mildly calcified. Cusp separation is normal. Velocity is within the normal range. There is no stenosis. There is no regurgitation. AORTA: Aortic root: The root is normal-sized. Ascending aorta: The vessel is dilated and 3.7cm diameter. MITRAL VALVE:  The annulus is normal. The leaflets are mildly thickened and mildly calcified. Leaflet separation is normal. Inflow velocity is within the normal range. There is no evidence for stenosis. There is moderate to severe regurgitation. The peak diastolic gradient is 9mm Hg. The valve area by pressure half-time is 2.3cm^2. The valve area index by pressure half-time is 1.33cm^2/m^2. LEFT ATRIUM:  The atrium is moderately dilated. RIGHT VENTRICLE:  The cavity size is normal. Wall thickness is normal. The TAPSE is reduced, suggestive of RV systolic dysfunction.  Systolic pressure is moderately increased. The estimated peak pressure is 61mm Hg.       The RV pressure during systole is 61mm Hg. PULMONIC VALVE:   The valve is structurally normal. Cusp separation is normal. Velocity is within the normal range. There is mild regurgitation. TRICUSPID VALVE:  The valve is structurally normal. Leaflet separation is normal. Inflow velocity is within the normal range. There is moderate regurgitation. PULMONARY ARTERIES: Not visualized. Systolic pressure is moderately to  severely increased, estimated to be 61mm Hg. RIGHT ATRIUM:  The atrium is dilated.       The estimated central venous pressure is 15mm Hg. PERICARDIUM:   There is no pericardial effusion.   There is a right pleural effusion and a left pleural effusion. SYSTEMIC VEINS: Inferior vena cava: The IVC is dilated.  Respirophasic diameter changes are blunted (< 50%). Atrial fibrillation. ------------------------------------------------------------------------------ Measurements  Left ventricle            Value             Ref       11/04/2022  Left atrium                Value          Ref       11/04/2022  GAVI, LAX chord        (H) 5.4   cm          3.8 - 5.2 4.9         AP dim, ES             (H) 5.6   cm       2.7 - 3.8 3.6  ESD, LAX chord        (H) 4.6   cm          2.2 - 3.5 3.2         AP dim index, ES       (H) 3.3   cm/m^2   1.5 - 2.3 2.1  GAVI/bsa, LAX chord    (N) 3.1   cm/m^2      2.3 - 3.1 2.8         Area ES, A4C           (H) 26    cm^2     <=20      17  ESD/bsa, LAX chord    (H) 2.7   cm/m^2      1.3 - 2.1 1.8         Area/bsa ES, A4C           15.33 cm^2/m^2 --------- 9.6  GAVI major ax, A4C         7.0   cm          --------- 6.9         Area ES, A2C               23    cm^2     --------- 23  ESD major ax, A4C         6.7   cm          --------- 5.0         Area/bsa ES, A2C           13.46 cm^2/m^2 --------- 13.04  FS major axis, A4C        5     %           --------- 28          Vol, S                 (H) 76    ml       22 - 52   ----------  GAVI/bsa major ax, A4C     4.1   cm/m^2      --------- 4.0         Vol/bsa, S             (H) 44    ml/m^2   16 - 34   36  ESD/bsa major ax, A4C     3.9   cm/m^2      --------- 2.9         Vol, ES, 1-p A4C       (H) 84    ml       22 - 52   49  DEANNE, A4C                  27.8  cm^2        --------- 27.5        Vol/bsa, ES, 1-p A4C   (H) 49    ml/m^2   11 - 40   28  COURT, A4C                  22.9  cm^2        --------- 14.0        Vol, ES, 1-p A2C       (H) 68    ml        22 - 52   73  FAC, A4C                  18    %           --------- 49          Vol/bsa, ES, 1-p A2C   (N) 40    ml/m^2   13 - 40   42  EDV                   (H) 157   ml          46 - 106  121         Vol, ES, 2-p               76    ml       --------- 65  ESV                   (H) 95    ml          14 - 42   33          Vol/bsa, ES, 2-p       (H) 44    ml/m^2   16 - 34   37  EF                    (L) 24    %           54 - 74   64  SV                        46    ml          --------- 74          Mitral valve               Value          Ref       11/04/2022  EDV/bsa               (H) 92    ml/m^2      29 - 61   70          Peak E                     1.52  m/sec    --------- 0.73  ESV/bsa               (H) 55    ml/m^2      8 - 24    19          Decel time                 141   ms       --------- 190  SV/bsa                    27    ml/m^2      --------- 43          PHT                        41    ms       --------- 56  SV, 1-p A4C               26    ml          --------- 57          Peak grad, D               9     mm Hg    --------- 2  SV/bsa, 1-p A4C           15    ml/m^2      --------- 32          MVA, PHT                   2.3   cm^2     --------- 4.2  EDV, 2-p              (N) 97    ml          46 - 106  82          MVA/bsa, PHT               1.33  cm^2/m^2 --------- 2.43  ESV, 2-p              (H) 74    ml          14 - 42   31  SV, 2-p                   24    ml          --------- 51          Tricuspid valve            Value          Ref       11/04/2022  EDV/bsa, 2-p          (N) 57    ml/m^2      29 - 61   47          TR peak v              (H) 3.4   m/sec    <=2.8     2.3  ESV/bsa, 2-p          (H) 43    ml/m^2      8 - 24    18          Peak RV-RA grad, S         46    mm Hg    --------- 22  SV/bsa, 2-p               13.8  ml/m^2      --------- 29.4                                                                    Aortic root                Value          Ref       11/04/2022  LVOT                       Value             Ref       11/04/2022  Root diam, ED          (N) 2.8   cm       2.5 - 3.9 2.6  Diam, S                   2.0   cm          --------- 2.1         S-T junct diam, ED     (N) 2.3   cm       2.0 - 3.2 1.9  Area                      3.1   cm^2        --------- 3.5         S-T junct diam/bsa, ED (N) 1.4   cm/m^2   1.1 - 1.9 1.1  Peak janet, S               0.56  m/sec       --------- 0.81  Peak grad, S              1     mm Hg       --------- 3           Ascending aorta            Value          Ref       11/04/2022  Qs                        3.4   L/min       --------- 5.5         AAo AP diam, ED        (H) 3.7   cm       1.9 - 3.5 3.2  Qs/bsa                    2     L/(min-m^2) --------- 3.2         AAo AP diam/bsa, ED    (N) 2.2   cm/m^2   1.0 - 2.2 1.8   Right ventricle           Value             Ref       11/04/2022  Pulmonary artery           Value          Ref       11/04/2022  TAPSE, MM             (L) 1.3   cm          >=1.7     2.1         Pressure, S                61    mm Hg    --------- ----------  Pressure, S               61    mm Hg       --------- 25                                                                    Systemic veins             Value          Ref       11/04/2022                                                                    Estimated CVP              15    mm Hg    --------- 3 Legend: (L)  and  (H)  gabrielle values outside specified reference range. (N)  marks values inside specified reference range. Prepared and electronically signed by Dakota Rizo MD 01/03/2023 15:43    CTA CHEST PULMONARY EMBOLISM W CONTRAST    Result Date: 1/2/2023  Narrative: EXAM: CTA CHEST PULMONARY EMBOLISM W CONTRAST CLINICAL INDICATION: Shortness of breath COMPARISON: 10/7/2022 TECHNIQUE:  2.5 mm axial images with 1.25 mm reformatted axial images obtained through the chest after IV contrast.  Coronal and rotational maximum intensity projection images also obtained. Automated exposure  control and/or iterative reconstruction techniques were utilized as radiation dose reduction strategies on this patient. CONTRAST: Name: Omnipaque Concentration: 350 Volume: 70 mL Route of Administration: Intravenous FINDINGS: Normal heart size.  No pulmonary embolus.  Nonopacification of the thoracic aorta.  Calcification thoracic aorta and coronary arteries. Small left pleural effusion. Moderate size right effusion. Trace pericardial effusion. Reflux of contrast in the IVC. Calcification abdominal aorta. Cystic lesion in the left kidney. Parapelvic cysts on prior CT 10/7/2022. Groundglass opacities are noted bilaterally. Can obscure potential pulmonary nodules Interstitial markings increased bilaterally. More focal groundglass opacity in the left upper lobe and left lower lobe the lungs. These are new from the prior examination. Arthritic changes in the spine. Mild vertebral body height loss T12.     Impression: No pulmonary embolus. Bilateral pleural effusions with trace pericardial effusion and ground glass opacities. New from the prior study. Suspicious for edema. Superimposed infiltrates difficult to exclude. Follow-up CT should be obtained till changes completely resolve.. Electronically Signed by: LAURA VILLEGAS M.D. Signed on: 1/2/2023 10:06 PM     [facility] EXTREMITY LOWER VENOUS DUPLEX    Result Date: 1/3/2023  Narrative: EXAM: US VAS LOWER EXTREMITY VENOUS DUPLEX BILATERAL CLINICAL INDICATION: Bilateral leg swelling. COMPARISON: 11/4/22 TECHNIQUE: Grayscale, color flow Doppler and spectral waveform analysis of both lower extremity veins was performed. FINDINGS: Calf veins are fairly well visualized.  No DVT within the bilateral legs. There is pulsatility of venous flow bilaterally which can be seen with tricuspid regurgitation or chronic right heart failure.     Impression: 1. There is no evidence of deep venous thrombosis in the lower extremities.  Other findings as above. Electronically Signed by: RAQUEL  AIDA BLEVINS Signed on: 1/3/2023 10:44 AM       Cardiology:    LAST EKG:    Encounter Date: 01/02/23   Electrocardiogram 12-Lead   Result Value    Ventricular Rate EKG/Min (BPM) 114    Atrial Rate (BPM) 129    QRS-Interval (MSEC) 78    QT-Interval (MSEC) 288    QTc 396    R Axis (Degrees) -8    T Axis (Degrees) 93    REPORT TEXT      Atrial fibrillation  with rapid ventricular response  with premature ventricular or aberrantly conducted complexes  Low voltage QRS  Septal infarct  , age undetermined  Abnormal ECG  Confirmed by ELBA RAMIREZ MD ([id number]) on 1/3/2023 3:27:08 PM           Assessment & Plan:  Atrial fibrillation with rapid ventricular response (CMS/HCC)  Presents to the ER with worsening dyspnea, palpations and increasing leg edema   Patient was found to be afib with RVR   Cardiology consulted   Cardizem drip started   Lovenox started for DVT prophylaxis at therapeutic dose     Cataract  Will con't home meds and monitor for concerns       Hypothyroidism  Will con't home meds   TSH ordered   Will monitor for concerns      Pedal edema  Diuretics given in ED   Cardiology consulted   Dopplers: no DVT       Restless legs syndrome  Will con't home meds and monitor for concerns       Retinal hemorrhage  Will con't home meds and monitor for concerns     Elevated brain natriuretic peptide (BNP) level  Admission BNP: 1,566   IV diuresis given in ED   Cardiology consulted for additional assistance  TTE - 1/3/2023  1.  Procedure narrative: A transthoracic echocardiogram was performed. Image      quality was good. The study was technically limited due to poor acoustic      window availability. Intravenous contrast (Definity 2ml) was administered      to opacify the left ventricle.  2.  Left ventricle: The cavity size is mildly dilated. Wall thickness is      normal. Systolic function is severely reduced. The ejection fraction was      measured by biplane method of disks. There is severe diffuse hypokinesis.       Left ventricular diastolic function parameters are indeterminate at      present time. The ejection fraction is 24%.  3.  Ascending aorta: The vessel is dilated and 3.7cm diameter.  4.  Mitral valve: There is moderate to severe regurgitation.  5.  Left atrium: The atrium is moderately dilated.  6.  Right ventricle: The cavity size is normal. Wall thickness is normal. The      TAPSE is reduced, suggestive of RV systolic dysfunction. Systolic pressure      is moderately increased. The estimated peak pressure is 61mm Hg.  7.  Right atrium: The atrium is dilated.  8.  Tricuspid valve: There is moderate regurgitation.  9.  Pulmonic valve: There is mild regurgitation.  10. Pulmonary arteries: Systolic pressure is moderately to severely increased,      estimated to be 61mm Hg.  11. Inferior vena cava: The IVC is dilated.  12. Pericardium, extracardiac: There is no pericardial effusion. There is a      right pleural effusion and a left pleural effusion.  13. Atrial fibrillation.       Acute heart failure with preserved ejection fraction (HFpEF) (CMS/[facility])  EF 64%- 8/2022  Now down to 24%           Anxiety  Will monitor   Daughter at bedside providing comfort for patient     Mitral regurgitation  Seen on echo - moderate to severe     Tricuspid regurgitation  Moderate regurgitation seen on echo       Acute HFrEF (heart failure with reduced ejection fraction) (CMS/[facility])  Cardiology following   New Systolic HF with EF 24% -   IV diuretics per cardiology     Echo noted, Lovenox ordered - Transition to oral anticoagulation once work up completed  Cardiac cath completed noted   Pending RUPAL today   HR con't to remain in Afib - HR up to 130's overnight and this AM with activity - cardiology following and adjusting meds    Follow up labs in AM   Acting scribe for Dr Guillermo  Please note time stamp may not reflect actual time patient was assessed   POC discussed with nursing and patient     ALFRED Wynn  1/5/2023  7:39 AM   show

## 2024-05-05 NOTE — ED ADULT NURSE REASSESSMENT NOTE - NS ED NURSE REASSESS COMMENT FT1
Observed wandering to patient room with positive redirection. Patient occupied room 1. Patient is awaiting for transfer, report given to ambulance and [facility], safety maintained.

## 2024-05-05 NOTE — ED PROVIDER NOTE - OBJECTIVE STATEMENT
63-year-old female history of  PPH of schizoaffective disorder, history of suicide attempts  initially presented to the ED concerning for overdose attempt.  Patient was medically cleared and sent to [facility].  Patient with history of COPD, on inhalers, active smoker.  Patient was found hypoxic 90 to 92% at [facility] and unable to accommodate patient requiring oxygen and sent back to the ED for further pulmonary evaluation.  No chest pain or shortness of breath.

## 2024-05-05 NOTE — ED ADULT NURSE REASSESSMENT NOTE - NS ED NURSE REASSESS COMMENT FT1
Patient transferred to [facility], all belongings returned. MD Gutierrez is the receiving MD, report was given to nurse [place]. Patient left safely with the .

## 2024-05-05 NOTE — ED ADULT NURSE NOTE - ED STAT RN HANDOFF DETAILS
Patient A&Ox3, resting in bed, anxious about plan of care, NAD,  vitals stable except elevated b.p 178/98. 1:1 continues to be at bedside, patient continues to be on cardiac monitor and 2 L Nc. medications given as per EMAR, blood sent. Hand off report given to STEPHEN King.

## 2024-05-05 NOTE — ED PROVIDER NOTE - PHYSICAL EXAMINATION
VITAL SIGNS: I have reviewed nursing notes and confirm.  CONSTITUTIONAL:  in no acute distress.  SKIN: Skin exam is warm and dry, no acute rash.  HEAD: Normocephalic; atraumatic.  EYES: PERRL, EOM intact; conjunctiva and sclera clear.  ENT: No nasal discharge; airway clear. Throat clear.  NECK: Supple; non tender.    CARD: Regular rate and rhythm.  RESP: (+) diffuse b/l wheezing   ABD:  soft; non-distended; non-tender;   EXT: Normal ROM. No clubbing, cyanosis or edema.  NEURO: Alert, oriented. Grossly unremarkable. No focal deficits.  moves all extremities,  normal gait   PSYCH: Cooperative.

## 2024-05-05 NOTE — ED PROVIDER NOTE - PROGRESS NOTE DETAILS
Received patient in signout from Dr. Clayton I evaluated patient independently myself she is in no respiratory distress not hypoxic nontachycardic unclear why was refused from [facility] investigating further will speak to [facility]  Will place patient on my observation status until further disposition clarified [facility]: pulmonary assessment noted; psychiatry consulted; will admit

## 2024-05-05 NOTE — ED ADULT NURSE NOTE - OBJECTIVE STATEMENT
pt A & Ox4 c/o anxiety. Respirations even and unlabored. Denies chest pain. Pt recent transfer from here, [facility] to [facility]. Returns today from [facility] for medical evaluation for decreased oxygen saturation. Pt reportedly 92% on admission. Pt reports hx of COPD, depression and PTSD. reports worsening depression over last few weeks, states worse when COPD acts up. Denies SI/HI/AV/VH. Cooperative during assessment. Respiratory WNL for pt baseline. 1:1 continual observation in place. IV established. Blood specimens sent to lab. Medications administered as ordered.

## 2024-05-06 DIAGNOSIS — F25.9 SCHIZOAFFECTIVE DISORDER, UNSPECIFIED: ICD-10-CM

## 2024-05-06 DIAGNOSIS — J44.1 CHRONIC OBSTRUCTIVE PULMONARY DISEASE WITH (ACUTE) EXACERBATION: ICD-10-CM

## 2024-05-06 DIAGNOSIS — F17.200 NICOTINE DEPENDENCE, UNSPECIFIED, UNCOMPLICATED: ICD-10-CM

## 2024-05-06 LAB
D DIMER BLD IA.RAPID-MCNC: 192 NG/ML DDU — SIGNIFICANT CHANGE UP
LACTATE BLDV-MCNC: 3.6 MMOL/L — HIGH (ref 0.5–2)

## 2024-05-06 PROCEDURE — 99223 1ST HOSP IP/OBS HIGH 75: CPT

## 2024-05-06 PROCEDURE — 99222 1ST HOSP IP/OBS MODERATE 55: CPT

## 2024-05-06 RX ORDER — NICOTINE POLACRILEX 2 MG
1 GUM BUCCAL DAILY
Refills: 0 | Status: DISCONTINUED | OUTPATIENT
Start: 2024-05-07 | End: 2024-05-10

## 2024-05-06 RX ORDER — IPRATROPIUM/ALBUTEROL SULFATE 18-103MCG
3 AEROSOL WITH ADAPTER (GRAM) INHALATION EVERY 6 HOURS
Refills: 0 | Status: DISCONTINUED | OUTPATIENT
Start: 2024-05-06 | End: 2024-05-08

## 2024-05-06 RX ORDER — ONDANSETRON 8 MG/1
4 TABLET, FILM COATED ORAL EVERY 8 HOURS
Refills: 0 | Status: DISCONTINUED | OUTPATIENT
Start: 2024-05-06 | End: 2024-05-10

## 2024-05-06 RX ORDER — NICOTINE POLACRILEX 2 MG
1 GUM BUCCAL ONCE
Refills: 0 | Status: COMPLETED | OUTPATIENT
Start: 2024-05-06 | End: 2024-05-06

## 2024-05-06 RX ORDER — IPRATROPIUM/ALBUTEROL SULFATE 18-103MCG
3 AEROSOL WITH ADAPTER (GRAM) INHALATION EVERY 6 HOURS
Refills: 0 | Status: DISCONTINUED | OUTPATIENT
Start: 2024-05-06 | End: 2024-05-10

## 2024-05-06 RX ORDER — PANTOPRAZOLE SODIUM 20 MG/1
40 TABLET, DELAYED RELEASE ORAL
Refills: 0 | Status: DISCONTINUED | OUTPATIENT
Start: 2024-05-06 | End: 2024-05-10

## 2024-05-06 RX ORDER — LANOLIN ALCOHOL/MO/W.PET/CERES
3 CREAM (GRAM) TOPICAL AT BEDTIME
Refills: 0 | Status: DISCONTINUED | OUTPATIENT
Start: 2024-05-06 | End: 2024-05-10

## 2024-05-06 RX ORDER — ACETAMINOPHEN 500 MG
650 TABLET ORAL EVERY 6 HOURS
Refills: 0 | Status: DISCONTINUED | OUTPATIENT
Start: 2024-05-06 | End: 2024-05-10

## 2024-05-06 RX ORDER — NICOTINE POLACRILEX 2 MG
GUM BUCCAL
Refills: 0 | Status: DISCONTINUED | OUTPATIENT
Start: 2024-05-06 | End: 2024-05-10

## 2024-05-06 RX ORDER — HEPARIN SODIUM 5000 [USP'U]/ML
5000 INJECTION INTRAVENOUS; SUBCUTANEOUS EVERY 12 HOURS
Refills: 0 | Status: DISCONTINUED | OUTPATIENT
Start: 2024-05-06 | End: 2024-05-10

## 2024-05-06 RX ADMIN — Medication 100 MILLIGRAM(S): at 17:23

## 2024-05-06 RX ADMIN — PANTOPRAZOLE SODIUM 40 MILLIGRAM(S): 20 TABLET, DELAYED RELEASE ORAL at 13:38

## 2024-05-06 RX ADMIN — Medication 40 MILLIGRAM(S): at 05:51

## 2024-05-06 RX ADMIN — Medication 3 MILLILITER(S): at 14:11

## 2024-05-06 RX ADMIN — Medication 3 MILLILITER(S): at 20:01

## 2024-05-06 RX ADMIN — Medication 40 MILLIGRAM(S): at 23:38

## 2024-05-06 RX ADMIN — Medication 1 PATCH: at 18:54

## 2024-05-06 RX ADMIN — Medication 40 MILLIGRAM(S): at 15:52

## 2024-05-06 RX ADMIN — Medication 1 PATCH: at 17:23

## 2024-05-06 RX ADMIN — HEPARIN SODIUM 5000 UNIT(S): 5000 INJECTION INTRAVENOUS; SUBCUTANEOUS at 13:38

## 2024-05-06 NOTE — BH CONSULTATION LIAISON ASSESSMENT NOTE - NSBHREFERDETAILS_PSY_A_CORE_FT
OD . was being transferred to [facility] but sent back to [facility] for hypoxia

## 2024-05-06 NOTE — CONSULT NOTE ADULT - SUBJECTIVE AND OBJECTIVE BOX
PULMONARY CONSULT NOTE      ELISE CORNELLELA-[id number]    Patient is a 63y old  Female who presents with a chief complaint of   smoking , cough with discolored sputum  no cp  intolerant of Breztri  uses Albuterol prn    HISTORY OF PRESENT ILLNESS:  feels less dyspneic  no cp  MEDICATIONS  (STANDING):  methylPREDNISolone sodium succinate Injectable 40 milliGRAM(s) IV Push every 8 hours      MEDICATIONS  (PRN):      Allergies    Advil (Angioedema)    Intolerances        PAST MEDICAL & SURGICAL HISTORY:  COPD (chronic obstructive pulmonary disease)      PTSD (post-traumatic stress disorder)      Bipolar 1 disorder          FAMILY HISTORY:      SOCIAL HISTORY  Smoking History:     REVIEW OF SYSTEMS:    CONSTITUTIONAL:  No fevers, chills, sweats    HEENT:  Eyes:  No diplopia or blurred vision. ENT:  No earache, sore throat or runny nose.    CARDIOVASCULAR:  No pressure, squeezing, tightness, or heaviness about the chest; no palpitations.    RESPIRATORY:  see HPI    GASTROINTESTINAL:  No abdominal pain, nausea, vomiting or diarrhea.    GENITOURINARY:  No dysuria, frequency or urgency.    NEUROLOGIC:  No paresthesias, fasciculations, seizures or weakness.    PSYCHIATRIC:  No disorder of thought or mood.    Vital Signs Last 24 Hrs  T(C): 36.8 (06 May 2024 08:12), Max: 36.9 (05 May 2024 16:01)  T(F): 98.2 (06 May 2024 08:12), Max: 98.4 (05 May 2024 16:01)  HR: 112 (06 May 2024 10:00) (75 - 123)  BP: 141/99 (06 May 2024 08:12) (124/83 - 141/99)  BP(mean): --  RR: 20 (06 May 2024 10:00) (18 - 20)  SpO2: 94% (06 May 2024 10:00) (91% - 94%)    Parameters below as of 06 May 2024 10:00  Patient On (Oxygen Delivery Method): nasal cannula  O2 Flow (L/min): 2      PHYSICAL EXAMINATION:    GENERAL: The patient is a well-developed, well-nourished _in no apparent distress.     HEENT: Head is normocephalic and atraumatic. Extraocular muscles are intact. Mucous membranes are moist.     NECK: Supple.     LUNGS: mod air entry bilat with faint exp  rhonchi. Respirations unlabored    HEART: Regular rate and rhythm without murmur.    ABDOMEN: Soft, nontender, and nondistended.  No hepatosplenomegaly is noted.    EXTREMITIES: Without any cyanosis, clubbing, rash, lesions or edema.    NEUROLOGIC: Grossly intact.      LABS:                        14.6   10.51 )-----------( 238      ( 05 May 2024 05:30 )             44.1     05-05    139  |  102  |  13.7  ----------------------------<  99  4.0   |  27.0  |  0.90    Ca    9.2      05 May 2024 05:30    TPro  7.3  /  Alb  4.3  /  TBili  0.5  /  DBili  x   /  AST  32<H>  /  ALT  25  /  AlkPhos  88  05-05      Urinalysis Basic - ( 05 May 2024 05:30 )    Color: x / Appearance: x / SG: x / pH: x  Gluc: 99 mg/dL / Ketone: x  / Bili: x / Urobili: x   Blood: x / Protein: x / Nitrite: x   Leuk Esterase: x / RBC: x / WBC x   Sq Epi: x / Non Sq Epi: x / Bacteria: x            D-Dimer Assay, Quantitative: 192 ng/mL DDU (05-06-24 @ 01:06)            MICROBIOLOGY:    RADIOLOGY & ADDITIONAL STUDIES:  CXR reviewed

## 2024-05-06 NOTE — H&P ADULT - ASSESSMENT
63-year-old female history of  PMH of history of COPD not on home oxygen, active smokers, Schizoaffective disorder, history of suicide attempts initially presented to the ED on 05/03/24 concerning for intentional drug overdose.  Patient was medically cleared and sent to [facility] on 05/04. Pt was sent back [facility]  for hypoxic. Patient was found hypoxic 90 to 92% at [facility] and unable to accommodate oxygen and sent back to the ED for further evaluation. Pt was placed on observation. Seen by pulmonary, started on iv steroid today.    COPD Exacerbation  -  -Oxygen supplement. Taper off as tolerated  -Nebs  -Iv solumedrol tapering dose  -Monitor BG while on steroid.  -Reviewed CXR  -F/U pulm rec      Schizoaffective disorder  Recent suicide attempt with drug over dose  -Pt was on Zyprexa fluoxetine trazodone at home which is on hold since last ed visit  -c/s psychiatry. f/u rec about meds.  -1:1 observation.    dvt ppx Lovenox sq    Plan of care roberth pt  ROBERTH Suarez     63-year-old female history of  PMH of history of COPD not on home oxygen, active smokers, Schizoaffective disorder, history of suicide attempts initially presented to the ED on 05/03/24 concerning for intentional drug overdose.  Patient was medically cleared and sent to [facility] on 05/04. Pt was sent back [facility]  for hypoxic. Patient was found hypoxic 90 to 92% at [facility] and unable to accommodate oxygen and sent back to the ED for further evaluation. Pt was placed on observation. Seen by pulmonary, started on iv steroid today.    COPD Exacerbation  -  -Oxygen supplement. Taper off as tolerated  -Nebs  -Iv solumedrol tapering dose  -Monitor BG while on steroid.  -Reviewed CXR.  -F/U pulm rec.      Schizoaffective disorder  Recent suicide attempt with drug over dose  -Pt was on Zyprexa fluoxetine trazodone at home which is on hold since last ed visit  -c/s psychiatry. f/u rec about meds.  -1:1 observation.    dvt ppx Lovenox sq    Plan of care dw pt  ROBERTH Suarez     63-year-old female history of  PMH of history of COPD not on home oxygen, active smokers, Schizoaffective disorder, history of suicide attempts initially presented to the ED on 05/03/24 concerning for intentional drug overdose.  Patient was medically cleared and sent to [facility] on 05/04. Pt was sent back [facility]  for hypoxic. Patient was found hypoxic 90 to 92% at [facility] and unable to accommodate oxygen and sent back to the ED for further evaluation. Pt was placed on observation. Seen by pulmonary, started on iv steroid today.    COPD Exacerbation  -  -Oxygen supplement. Taper off as tolerated  -Nebs  -Iv solumedrol tapering dose. po doxy  -Monitor BG while on steroid.  -Reviewed CXR.  -D-dimer  -F/U pulm rec.      Schizoaffective disorder  Recent suicide attempt with drug over dose  -Pt was on Zyprexa fluoxetine trazodone at home which is on hold since last ed visit  -c/s psychiatry. f/u rec about meds.  -1:1 observation.    dvt ppx Lovenox sq    Plan of care dw pt  ROBERTH Suarez

## 2024-05-06 NOTE — BH CONSULTATION LIAISON ASSESSMENT NOTE - CURRENT MEDICATION
MEDICATIONS  (STANDING):  albuterol/ipratropium for Nebulization 3 milliLiter(s) Nebulizer every 6 hours  albuterol/ipratropium for Nebulization 3 milliLiter(s) Nebulizer every 6 hours  doxycycline monohydrate Capsule 100 milliGRAM(s) Oral every 12 hours  heparin   Injectable 5000 Unit(s) SubCutaneous every 12 hours  methylPREDNISolone sodium succinate Injectable 40 milliGRAM(s) IV Push every 8 hours  nicotine -  14 mG/24Hr(s) Patch      nicotine -  14 mG/24Hr(s) Patch 1 Patch Transdermal once  pantoprazole    Tablet 40 milliGRAM(s) Oral before breakfast    MEDICATIONS  (PRN):  acetaminophen     Tablet .. 650 milliGRAM(s) Oral every 6 hours PRN Temp greater or equal to 38C (100.4F), Mild Pain (1 - 3)  aluminum hydroxide/magnesium hydroxide/simethicone Suspension 30 milliLiter(s) Oral every 4 hours PRN Dyspepsia  melatonin 3 milliGRAM(s) Oral at bedtime PRN Insomnia  ondansetron Injectable 4 milliGRAM(s) IV Push every 8 hours PRN Nausea and/or Vomiting

## 2024-05-06 NOTE — BH CONSULTATION LIAISON ASSESSMENT NOTE - SUMMARY
Patient is a 63 year old female who is currently on SSD, living with her Fiance of over 20 years, with a psychiatric history of schizoaffective disorder,  2 prior suicide attempt by OD, and [facility] who was originally evaluated by [facility] ED for OD and sent to [facility] for psychiatric admission and now sent back and medically admitted for hypoxia.     Patient seen and found to be calm and cooperative. patient admitted to medical floor for hypoxia, recommend restarting psychotropics as below and psychiatry will follow to treat depression and help determine disposition after patient is medically cleared (inpatient psych vs home)     Recs   -1 to 1 observation  -Restart Prozac 10mg po daily   -Restart Zyprexa 15mg po at bedtime   -Trazodone 50mg at bedtime PRN for Insomnia   -will continue to follow

## 2024-05-06 NOTE — BH CONSULTATION LIAISON ASSESSMENT NOTE - TIME CONSULT PERFORMED
Implemented All Universal Safety Interventions:  [place] to call system. Call bell, personal items and telephone within reach. Instruct patient to call for assistance. Room bathroom lighting operational. Non-slip footwear when patient is off stretcher. Physically safe environment: no spills, clutter or unnecessary equipment. Stretcher in lowest position, wheels locked, appropriate side rails in place. 06-May-2024 16:30

## 2024-05-06 NOTE — ED ADULT NURSE REASSESSMENT NOTE - NS ED NURSE REASSESS COMMENT FT1
Patient A&Ox3, resting in bed, awake and calm, SPO2 fluctuating between 79-84 % on R.A. MD Browne informed, patient placed on 2L oxygen via NC, SPO2 94 %. Vitals stable except tachycardia 112/min. awaiting provider reassessment. Patient A&Ox3, resting in bed, awake and calm, SPO2 fluctuating between 79-84 % on R.A. MD Browne informed, patient placed on 2L oxygen via NC, SPO2 94 %. Vitals stable except tachycardia 112/min. awaiting provider reassessment. 1:1 at bed side. Patient A&Ox3, resting in bed, awake and calm, SPO2 fluctuating between 79-84 % on R.A. MD Browne informed, patient placed on 2L oxygen via NC, SPO2 94 %. Vitals stable except tachycardia 112/min. awaiting provider reassessment and psychiatric evaluation. 1:1 at bed side.

## 2024-05-06 NOTE — H&P ADULT - NSHPPHYSICALEXAM_GEN_ALL_CORE
T(C): 36.8 (05-06-24 @ 08:12), Max: 36.9 (05-05-24 @ 16:01)  HR: 112 (05-06-24 @ 10:00) (75 - 123)  BP: 141/99 (05-06-24 @ 08:12) (124/83 - 141/99)  RR: 20 (05-06-24 @ 10:00) (18 - 20)  SpO2: 94% (05-06-24 @ 10:00) (91% - 94%)    GEN - NAD  HEENT - NCAT, EOMI, DERICK, no JVD/bruit.  RESP - positive air entry, no wheeze. mild basal crackles  CARDIO - NS1S2, RRR. No murmurs/rubs/gallops.  ABD - Soft/Non tender/Non distended. Normal BS x4 quadrants.   Ext - No ANDREIA.   MSK - full ROM of BL upper and lower extremities without pain or restriction. BL 5/5 strength on upper and lower extremities.   Neuro - cn 2-12 grossly intact.  no tremor. gait not observed.   Psych- AAOx3. denies suicidal/homicidal ideation. calm/coop now.

## 2024-05-06 NOTE — BH CONSULTATION LIAISON ASSESSMENT NOTE - NSICDXBHPRIMARYDX_PSY_ALL_CORE
[de-identified] : The patient is a 53 year M who returns after undergoing ORIF at [facility]. Had 2 post-op visits at [facility]. The surgery was on 9/13/2023. The patient was recovering at home. He reports some pain today.  Today, Jan 04, 2024 patient is here for a follow up and further evaluation.
Schizoaffective disorder   F25.9

## 2024-05-06 NOTE — ED ADULT NURSE REASSESSMENT NOTE - NS ED NURSE REASSESS COMMENT FT1
Patient A&Ox3, resting in bed, NAD, VSS except tachycardia 113/min, SPO2 91% on 2L NC. I: I at bedside. awaiting admission.

## 2024-05-06 NOTE — BH CONSULTATION LIAISON ASSESSMENT NOTE - NSBHCHARTREVIEWVS_PSY_A_CORE FT
Vital Signs Last 24 Hrs  T(C): 36.7 (06 May 2024 14:06), Max: 36.9 (06 May 2024 12:45)  T(F): 98 (06 May 2024 14:06), Max: 98.5 (06 May 2024 12:45)  HR: 81 (06 May 2024 14:16) (75 - 112)  BP: 170/92 (06 May 2024 14:06) (124/83 - 178/92)  BP(mean): --  RR: 23 (06 May 2024 14:06) (17 - 23)  SpO2: 95% (06 May 2024 14:16) (90% - 95%)    Parameters below as of 06 May 2024 14:16  Patient On (Oxygen Delivery Method): nasal cannula

## 2024-05-06 NOTE — PATIENT PROFILE ADULT - FALL HARM RISK - UNIVERSAL INTERVENTIONS
Bed in lowest position, wheels locked, appropriate side rails in place/Call bell, personal items and telephone in reach/Instruct patient to call for assistance before getting out of bed or chair/Non-slip footwear when patient is out of bed/[place] to call system/Physically safe environment - no spills, clutter or unnecessary equipment/Purposeful Proactive Rounding/Room/bathroom lighting operational, light cord in reach

## 2024-05-06 NOTE — CONSULT NOTE ADULT - TIME BILLING
greater than 50% of time spent reviewing labs, notes, orders and radiographs, coordinating care  discussed with pt and ER team

## 2024-05-06 NOTE — CONSULT NOTE ADULT - ASSESSMENT
COPD exacerbation  complicated by psych and nicotine addiction  Improved, but borderline sp02 at rest  needs continued rx and home 02 eval prior to d/c  CXR , Dimer, BNP, Trop all negative  medrol, nebs, po abx  smoking cessation  home nebulizer  LAMA/LABA/ ICS as out pt  Pulmonary follow up as out pt COPD exacerbation  complicated by psych and nicotine addiction  Improved, but borderline sp02 at rest  needs continued rx and home 02 eval prior to d/c  CXR , Dimer, BNP, Trop all negative  medrol, nebs, po abx if any discolored  sputum  smoking cessation  home nebulizer  LAMA/LABA/ ICS as out pt  psych mgmt per ER/Med team  Pulmonary follow up as out pt COPD exacerbation  complicated by psych and nicotine addiction  Improved, but borderline sp02 at rest  needs continued rx and home 02 eval prior to d/c  CXR , Dimer, BNP, Trop all negative  medrol, nebs, po abx   smoking cessation  home nebulizer  LAMA/LABA/ ICS as out pt  psych mgmt per ER/Med team  Pulmonary follow up as out pt

## 2024-05-06 NOTE — ED ADULT NURSE REASSESSMENT NOTE - NS ED NURSE REASSESS COMMENT FT1
Assumed care from STEPHEN Lang at 0720, patient A&Ox3, resting in bed, continues to be on cardiac monitor and , respirations even and unlabored on RA, 1:1 remains at bedside. Vitals stable. Denies any SI or HI at this time, awaiting provider reassessment.

## 2024-05-06 NOTE — BH CONSULTATION LIAISON ASSESSMENT NOTE - HPI (INCLUDE ILLNESS QUALITY, SEVERITY, DURATION, TIMING, CONTEXT, MODIFYING FACTORS, ASSOCIATED SIGNS AND SYMPTOMS)
Patient is a 63 year old female who is currently on SSD, living with her Fiance of over 20 years, with a psychiatric history of schizoaffective disorder,  2 prior suicide attempt by OD, and currently [facility] who was originally evaluated by [facility] ED for OD and sent to [facility] for psychiatric admission and now sent back and medically admitted for hypoxia.     Patient seen and found to be calm and cooperative while seen laying in bed on O2 by nasal canula. patient explains how she initially ended up in the hospital due taking too much of her medication explaining that she has been very depressed and was trying anything to feel better. patient expresses depressed mood, poor sleep, poor appetite but denies any s/h ideation, symptoms of nelli or AVH     Collateral taken from patient qasim Erazo who corroborates above expresses concern for pts depression stating patient has no motivation, no sleeping and not caring for herself.

## 2024-05-06 NOTE — H&P ADULT - HISTORY OF PRESENT ILLNESS
63-year-old female history of  PMH of history of COPD not on home oxygen, active smokers, Schizoaffective disorder, history of suicide attempts initially presented to the ED on 05/03/24 concerning for intentional drug overdose.  Patient was medically cleared and sent to [facility] on 05/04. Pt was sent back [facility]  for hypoxic. Patient was found hypoxic 90 to 92% at [facility] and unable to accommodate oxygen and sent back to the ED for further evaluation. Pt was placed on observation. Seen by pulmonary, started on iv steroid today. Pt will be admit be medicine. Pt is c/o cough,sob on exertion.Denies cp,fever,chill,N/V/Dizziness.    PMH:COPD not on home oxygen, active smokers, Schizoaffective disorder, history of suicide attempts i  FH: HTN,Alcoholism both parents  SH: smoking cigarette 1/2 PPD,denies alcohol,illicit drugs use.    < from: Xray Chest 1 View- PORTABLE-Urgent (Xray Chest 1 View- PORTABLE-Urgent .) (05.05.24 @ 05:21) >    IMPRESSION: Negative chest.    < end of copied text >    63-year-old female history of  PMH of history of COPD not on home oxygen, active smokers, Schizoaffective disorder, history of suicide attempts initially presented to the ED on 05/03/24 concerning for intentional drug overdose.  Patient was medically cleared and sent to [facility] on 05/04. Pt was sent back [facility]  for hypoxic. Patient was found hypoxic 90 to 92% at [facility] and unable to accommodate oxygen and sent back to the ED for further evaluation. Pt was placed on observation. Seen by pulmonary, started on iv steroid today. Pt will be admit be medicine. Pt is c/o cough,sob on exertion.Denies cp,fever,chill,N/V/Dizziness.    PMH: COPD not on home oxygen, active smokers, Schizoaffective disorder, history of suicide attempts i  FH: HTN,Alcoholism both parents  SH: smoking cigarette 1/2 PPD,denies alcohol,illicit drugs use.    < from: Xray Chest 1 View- PORTABLE-Urgent (Xray Chest 1 View- PORTABLE-Urgent .) (05.05.24 @ 05:21) >    IMPRESSION: Negative chest.    < end of copied text >    63-year-old female history of  PMH of history of COPD not on home oxygen, active smokers, Schizoaffective disorder, history of suicide attempts initially presented to the ED on 05/03/24 concerning for intentional drug overdose.  Patient was medically cleared and sent to [facility] on 05/04. Pt was sent back [facility]  for hypoxic. Patient was found hypoxic 90 to 92% at [facility] and unable to accommodate oxygen and sent back to the ED for further evaluation. Pt was placed on observation. Seen by pulmonary, started on iv steroid today. Pt will be admit be medicine. Pt is c/o cough,sob on exertion.Denies cp,fever,chill,N/V/Dizziness.    PMH: COPD not on home oxygen, active smokers, Schizoaffective disorder, history of suicide attempts.  FH: HTN,Alcoholism both parents  SH: smoking cigarette 1/2 PPD,denies alcohol,illicit drugs use.    < from: Xray Chest 1 View- PORTABLE-Urgent (Xray Chest 1 View- PORTABLE-Urgent .) (05.05.24 @ 05:21) >    IMPRESSION: Negative chest.    < end of copied text >

## 2024-05-07 LAB
ANION GAP SERPL CALC-SCNC: 11 MMOL/L — SIGNIFICANT CHANGE UP (ref 5–17)
BUN SERPL-MCNC: 16.8 MG/DL — SIGNIFICANT CHANGE UP (ref 8–20)
CALCIUM SERPL-MCNC: 8.8 MG/DL — SIGNIFICANT CHANGE UP (ref 8.4–10.5)
CHLORIDE SERPL-SCNC: 102 MMOL/L — SIGNIFICANT CHANGE UP (ref 96–108)
CO2 SERPL-SCNC: 28 MMOL/L — SIGNIFICANT CHANGE UP (ref 22–29)
CREAT SERPL-MCNC: 0.6 MG/DL — SIGNIFICANT CHANGE UP (ref 0.5–1.3)
EGFR: 101 ML/MIN/1.73M2 — SIGNIFICANT CHANGE UP
GLUCOSE SERPL-MCNC: 179 MG/DL — HIGH (ref 70–99)
HCT VFR BLD CALC: 40 % — SIGNIFICANT CHANGE UP (ref 34.5–45)
HGB BLD-MCNC: 13.2 G/DL — SIGNIFICANT CHANGE UP (ref 11.5–15.5)
LACTATE SERPL-SCNC: 2.2 MMOL/L — HIGH (ref 0.5–2)
LACTATE SERPL-SCNC: 2.2 MMOL/L — HIGH (ref 0.5–2)
MCHC RBC-ENTMCNC: 28.9 PG — SIGNIFICANT CHANGE UP (ref 27–34)
MCHC RBC-ENTMCNC: 33 GM/DL — SIGNIFICANT CHANGE UP (ref 32–36)
MCV RBC AUTO: 87.7 FL — SIGNIFICANT CHANGE UP (ref 80–100)
PLATELET # BLD AUTO: 229 K/UL — SIGNIFICANT CHANGE UP (ref 150–400)
POTASSIUM SERPL-MCNC: 4.5 MMOL/L — SIGNIFICANT CHANGE UP (ref 3.5–5.3)
POTASSIUM SERPL-SCNC: 4.5 MMOL/L — SIGNIFICANT CHANGE UP (ref 3.5–5.3)
RBC # BLD: 4.56 M/UL — SIGNIFICANT CHANGE UP (ref 3.8–5.2)
RBC # FLD: 12.9 % — SIGNIFICANT CHANGE UP (ref 10.3–14.5)
SODIUM SERPL-SCNC: 141 MMOL/L — SIGNIFICANT CHANGE UP (ref 135–145)
WBC # BLD: 24.49 K/UL — HIGH (ref 3.8–10.5)
WBC # FLD AUTO: 24.49 K/UL — HIGH (ref 3.8–10.5)

## 2024-05-07 PROCEDURE — 99233 SBSQ HOSP IP/OBS HIGH 50: CPT

## 2024-05-07 PROCEDURE — 99232 SBSQ HOSP IP/OBS MODERATE 35: CPT

## 2024-05-07 RX ORDER — POLYETHYLENE GLYCOL 3350 17 G/17G
17 POWDER, FOR SOLUTION ORAL ONCE
Refills: 0 | Status: COMPLETED | OUTPATIENT
Start: 2024-05-07 | End: 2024-05-08

## 2024-05-07 RX ORDER — SERTRALINE 25 MG/1
25 TABLET, FILM COATED ORAL DAILY
Refills: 0 | Status: DISCONTINUED | OUTPATIENT
Start: 2024-05-07 | End: 2024-05-10

## 2024-05-07 RX ORDER — OLANZAPINE 15 MG/1
15 TABLET, FILM COATED ORAL AT BEDTIME
Refills: 0 | Status: DISCONTINUED | OUTPATIENT
Start: 2024-05-07 | End: 2024-05-10

## 2024-05-07 RX ORDER — SENNA PLUS 8.6 MG/1
2 TABLET ORAL AT BEDTIME
Refills: 0 | Status: DISCONTINUED | OUTPATIENT
Start: 2024-05-07 | End: 2024-05-10

## 2024-05-07 RX ADMIN — Medication 3 MILLILITER(S): at 03:27

## 2024-05-07 RX ADMIN — HEPARIN SODIUM 5000 UNIT(S): 5000 INJECTION INTRAVENOUS; SUBCUTANEOUS at 17:16

## 2024-05-07 RX ADMIN — Medication 100 MILLIGRAM(S): at 06:19

## 2024-05-07 RX ADMIN — Medication 1 PATCH: at 12:39

## 2024-05-07 RX ADMIN — Medication 1 PATCH: at 07:38

## 2024-05-07 RX ADMIN — HEPARIN SODIUM 5000 UNIT(S): 5000 INJECTION INTRAVENOUS; SUBCUTANEOUS at 06:19

## 2024-05-07 RX ADMIN — Medication 3 MILLILITER(S): at 14:57

## 2024-05-07 RX ADMIN — OLANZAPINE 15 MILLIGRAM(S): 15 TABLET, FILM COATED ORAL at 22:52

## 2024-05-07 RX ADMIN — Medication 1 PATCH: at 17:15

## 2024-05-07 RX ADMIN — Medication 1 PATCH: at 23:46

## 2024-05-07 RX ADMIN — Medication 40 MILLIGRAM(S): at 14:04

## 2024-05-07 RX ADMIN — Medication 3 MILLILITER(S): at 19:59

## 2024-05-07 RX ADMIN — Medication 3 MILLILITER(S): at 08:24

## 2024-05-07 RX ADMIN — Medication 100 MILLIGRAM(S): at 17:16

## 2024-05-07 RX ADMIN — Medication 40 MILLIGRAM(S): at 06:19

## 2024-05-07 RX ADMIN — PANTOPRAZOLE SODIUM 40 MILLIGRAM(S): 20 TABLET, DELAYED RELEASE ORAL at 06:19

## 2024-05-07 RX ADMIN — SERTRALINE 25 MILLIGRAM(S): 25 TABLET, FILM COATED ORAL at 14:04

## 2024-05-07 NOTE — PROGRESS NOTE ADULT - TIME BILLING
greater than 50% of time spent reviewing labs, notes, orders and radiographs, coordinating care  discussed with pt and med team, psych

## 2024-05-07 NOTE — PROGRESS NOTE ADULT - ASSESSMENT
63 yr old female with COPD not on home oxygen, schizoaffective disorder with prior suicide attempts, smoker was sent in for evaluation of hypoxia from [facility]. She was admitted to [facility] for intentional drug overdose. IN ED, she was noted to have hypoxia, initially admitted to observation unit, she was evaluated by pulmonary and IV steroids were initiated. Psychiatry consultation requested,  constant observation was continued. She was recommended Zoloft, Trazodone.     1. COPD exacerbation:  transition to Prednisone  wean off oxygen as tolerated.  Duoneb  pulmonary follow up  repeat lactate and Complete course of Doxycycline.   Duoneb    2. Schizoaffective disorder, with intentional drug overdose:  Maintain 1:1  start Zoloft and Zyprexa  psych recommendations noted.  will need further assessment if needs inpatient psych.    3. DVT ppx:  Heparin    Discussed with patient, RN and psychiatry.

## 2024-05-07 NOTE — PROGRESS NOTE ADULT - ASSESSMENT
COPD exacerbation- improved  complicated by psych and nicotine addiction  Lung exam much improved, now 96% RA  wean medrol, slow prednisone taper  finish abx  smoking cessation stressed, discussed nicotine replacement   home nebulizer  LAMA/LABA/ ICS as out pt- has Breztri, please provide spacer  spoke with psych  Pulmonary follow up as out pt COPD exacerbation- improved  complicated by psych and nicotine addiction  Lung exam much improved, now 96% RA  wean medrol, slow prednisone taper  finish abx  smoking cessation stressed, discussed nicotine replacement   home nebulizer  LAMA/LABA/ ICS as out pt- has Breztri, please provide spacer  spoke with psych  Pulmonary follow up as out pt, low dose rad CT scan as out pt

## 2024-05-08 LAB
ANION GAP SERPL CALC-SCNC: 13 MMOL/L — SIGNIFICANT CHANGE UP (ref 5–17)
BASOPHILS # BLD AUTO: 0.03 K/UL — SIGNIFICANT CHANGE UP (ref 0–0.2)
BASOPHILS NFR BLD AUTO: 0.1 % — SIGNIFICANT CHANGE UP (ref 0–2)
BUN SERPL-MCNC: 15.5 MG/DL — SIGNIFICANT CHANGE UP (ref 8–20)
CALCIUM SERPL-MCNC: 8.9 MG/DL — SIGNIFICANT CHANGE UP (ref 8.4–10.5)
CHLORIDE SERPL-SCNC: 104 MMOL/L — SIGNIFICANT CHANGE UP (ref 96–108)
CO2 SERPL-SCNC: 25 MMOL/L — SIGNIFICANT CHANGE UP (ref 22–29)
CREAT SERPL-MCNC: 0.65 MG/DL — SIGNIFICANT CHANGE UP (ref 0.5–1.3)
EGFR: 99 ML/MIN/1.73M2 — SIGNIFICANT CHANGE UP
EOSINOPHIL # BLD AUTO: 0.01 K/UL — SIGNIFICANT CHANGE UP (ref 0–0.5)
EOSINOPHIL NFR BLD AUTO: 0 % — SIGNIFICANT CHANGE UP (ref 0–6)
GLUCOSE SERPL-MCNC: 150 MG/DL — HIGH (ref 70–99)
HCT VFR BLD CALC: 39.9 % — SIGNIFICANT CHANGE UP (ref 34.5–45)
HGB BLD-MCNC: 13.2 G/DL — SIGNIFICANT CHANGE UP (ref 11.5–15.5)
IMM GRANULOCYTES NFR BLD AUTO: 1.3 % — HIGH (ref 0–0.9)
LYMPHOCYTES # BLD AUTO: 10.4 % — LOW (ref 13–44)
LYMPHOCYTES # BLD AUTO: 2.11 K/UL — SIGNIFICANT CHANGE UP (ref 1–3.3)
MAGNESIUM SERPL-MCNC: 2.4 MG/DL — SIGNIFICANT CHANGE UP (ref 1.6–2.6)
MCHC RBC-ENTMCNC: 28.4 PG — SIGNIFICANT CHANGE UP (ref 27–34)
MCHC RBC-ENTMCNC: 33.1 GM/DL — SIGNIFICANT CHANGE UP (ref 32–36)
MCV RBC AUTO: 85.8 FL — SIGNIFICANT CHANGE UP (ref 80–100)
MONOCYTES # BLD AUTO: 0.99 K/UL — HIGH (ref 0–0.9)
MONOCYTES NFR BLD AUTO: 4.9 % — SIGNIFICANT CHANGE UP (ref 2–14)
NEUTROPHILS # BLD AUTO: 16.93 K/UL — HIGH (ref 1.8–7.4)
NEUTROPHILS NFR BLD AUTO: 83.3 % — HIGH (ref 43–77)
PHOSPHATE SERPL-MCNC: 2.5 MG/DL — SIGNIFICANT CHANGE UP (ref 2.4–4.7)
PLATELET # BLD AUTO: 227 K/UL — SIGNIFICANT CHANGE UP (ref 150–400)
POTASSIUM SERPL-MCNC: 3.5 MMOL/L — SIGNIFICANT CHANGE UP (ref 3.5–5.3)
POTASSIUM SERPL-SCNC: 3.5 MMOL/L — SIGNIFICANT CHANGE UP (ref 3.5–5.3)
RBC # BLD: 4.65 M/UL — SIGNIFICANT CHANGE UP (ref 3.8–5.2)
RBC # FLD: 13.1 % — SIGNIFICANT CHANGE UP (ref 10.3–14.5)
SODIUM SERPL-SCNC: 142 MMOL/L — SIGNIFICANT CHANGE UP (ref 135–145)
WBC # BLD: 20.34 K/UL — HIGH (ref 3.8–10.5)
WBC # FLD AUTO: 20.34 K/UL — HIGH (ref 3.8–10.5)

## 2024-05-08 PROCEDURE — 99232 SBSQ HOSP IP/OBS MODERATE 35: CPT

## 2024-05-08 RX ADMIN — SERTRALINE 25 MILLIGRAM(S): 25 TABLET, FILM COATED ORAL at 09:52

## 2024-05-08 RX ADMIN — SENNA PLUS 2 TABLET(S): 8.6 TABLET ORAL at 21:28

## 2024-05-08 RX ADMIN — Medication 100 MILLIGRAM(S): at 05:07

## 2024-05-08 RX ADMIN — Medication 100 MILLIGRAM(S): at 17:09

## 2024-05-08 RX ADMIN — POLYETHYLENE GLYCOL 3350 17 GRAM(S): 17 POWDER, FOR SOLUTION ORAL at 00:35

## 2024-05-08 RX ADMIN — Medication 3 MILLILITER(S): at 08:31

## 2024-05-08 RX ADMIN — Medication 40 MILLIGRAM(S): at 05:06

## 2024-05-08 RX ADMIN — PANTOPRAZOLE SODIUM 40 MILLIGRAM(S): 20 TABLET, DELAYED RELEASE ORAL at 05:06

## 2024-05-08 RX ADMIN — Medication 3 MILLIGRAM(S): at 21:27

## 2024-05-08 RX ADMIN — Medication 3 MILLILITER(S): at 21:06

## 2024-05-08 RX ADMIN — HEPARIN SODIUM 5000 UNIT(S): 5000 INJECTION INTRAVENOUS; SUBCUTANEOUS at 17:09

## 2024-05-08 RX ADMIN — Medication 1 PATCH: at 09:53

## 2024-05-08 RX ADMIN — Medication 3 MILLILITER(S): at 14:22

## 2024-05-08 RX ADMIN — Medication 1 PATCH: at 20:18

## 2024-05-08 RX ADMIN — Medication 1 PATCH: at 20:17

## 2024-05-08 RX ADMIN — Medication 3 MILLILITER(S): at 04:27

## 2024-05-08 RX ADMIN — Medication 1 PATCH: at 09:51

## 2024-05-08 RX ADMIN — HEPARIN SODIUM 5000 UNIT(S): 5000 INJECTION INTRAVENOUS; SUBCUTANEOUS at 05:10

## 2024-05-08 RX ADMIN — OLANZAPINE 15 MILLIGRAM(S): 15 TABLET, FILM COATED ORAL at 21:27

## 2024-05-08 NOTE — PROGRESS NOTE ADULT - ASSESSMENT
63 yr old female with COPD not on home oxygen, schizoaffective disorder with prior suicide attempts, smoker was sent in for evaluation of hypoxia from [facility]. She was admitted to [facility] for intentional drug overdose. IN ED, she was noted to have hypoxia, initially admitted to observation unit, she was evaluated by pulmonary and IV steroids were initiated. Psychiatry consultation requested,  constant observation was continued. She was recommended Zoloft, Trazodone.     *acute on chronic COPD w exacerbation  cont Prednisone  Not able to titrate O2 today, currently on 2 L NC   Duoneb  pulmonary following   repeat lactate and Complete course of Doxycycline.   Duoneb    *Schizoaffective disorder v. Bipolar  Psy following   Maintain 1:1  start Zoloft and Zyprexa  possible mood stabilizer   psych recommendations noted.  likely will dc home     *DVT ppx:  Heparin

## 2024-05-09 ENCOUNTER — TRANSCRIPTION ENCOUNTER (OUTPATIENT)
Age: 64
End: 2024-05-09

## 2024-05-09 LAB
ANION GAP SERPL CALC-SCNC: 11 MMOL/L — SIGNIFICANT CHANGE UP (ref 5–17)
BUN SERPL-MCNC: 22.2 MG/DL — HIGH (ref 8–20)
CALCIUM SERPL-MCNC: 8.7 MG/DL — SIGNIFICANT CHANGE UP (ref 8.4–10.5)
CHLORIDE SERPL-SCNC: 103 MMOL/L — SIGNIFICANT CHANGE UP (ref 96–108)
CO2 SERPL-SCNC: 27 MMOL/L — SIGNIFICANT CHANGE UP (ref 22–29)
CREAT SERPL-MCNC: 0.72 MG/DL — SIGNIFICANT CHANGE UP (ref 0.5–1.3)
EGFR: 94 ML/MIN/1.73M2 — SIGNIFICANT CHANGE UP
GLUCOSE SERPL-MCNC: 96 MG/DL — SIGNIFICANT CHANGE UP (ref 70–99)
HCT VFR BLD CALC: 40 % — SIGNIFICANT CHANGE UP (ref 34.5–45)
HGB BLD-MCNC: 13.1 G/DL — SIGNIFICANT CHANGE UP (ref 11.5–15.5)
MCHC RBC-ENTMCNC: 28.5 PG — SIGNIFICANT CHANGE UP (ref 27–34)
MCHC RBC-ENTMCNC: 32.8 GM/DL — SIGNIFICANT CHANGE UP (ref 32–36)
MCV RBC AUTO: 87.1 FL — SIGNIFICANT CHANGE UP (ref 80–100)
PLATELET # BLD AUTO: 197 K/UL — SIGNIFICANT CHANGE UP (ref 150–400)
POTASSIUM SERPL-MCNC: 3.8 MMOL/L — SIGNIFICANT CHANGE UP (ref 3.5–5.3)
POTASSIUM SERPL-SCNC: 3.8 MMOL/L — SIGNIFICANT CHANGE UP (ref 3.5–5.3)
RBC # BLD: 4.59 M/UL — SIGNIFICANT CHANGE UP (ref 3.8–5.2)
RBC # FLD: 13.3 % — SIGNIFICANT CHANGE UP (ref 10.3–14.5)
SODIUM SERPL-SCNC: 141 MMOL/L — SIGNIFICANT CHANGE UP (ref 135–145)
WBC # BLD: 13.89 K/UL — HIGH (ref 3.8–10.5)
WBC # FLD AUTO: 13.89 K/UL — HIGH (ref 3.8–10.5)

## 2024-05-09 PROCEDURE — 99232 SBSQ HOSP IP/OBS MODERATE 35: CPT

## 2024-05-09 RX ORDER — HYDROXYZINE HCL 10 MG
25 TABLET ORAL
Refills: 0 | Status: DISCONTINUED | OUTPATIENT
Start: 2024-05-09 | End: 2024-05-10

## 2024-05-09 RX ADMIN — HEPARIN SODIUM 5000 UNIT(S): 5000 INJECTION INTRAVENOUS; SUBCUTANEOUS at 17:22

## 2024-05-09 RX ADMIN — Medication 3 MILLILITER(S): at 08:17

## 2024-05-09 RX ADMIN — OLANZAPINE 15 MILLIGRAM(S): 15 TABLET, FILM COATED ORAL at 21:27

## 2024-05-09 RX ADMIN — HEPARIN SODIUM 5000 UNIT(S): 5000 INJECTION INTRAVENOUS; SUBCUTANEOUS at 05:07

## 2024-05-09 RX ADMIN — Medication 1 PATCH: at 21:27

## 2024-05-09 RX ADMIN — Medication 3 MILLILITER(S): at 20:22

## 2024-05-09 RX ADMIN — PANTOPRAZOLE SODIUM 40 MILLIGRAM(S): 20 TABLET, DELAYED RELEASE ORAL at 05:08

## 2024-05-09 RX ADMIN — SENNA PLUS 2 TABLET(S): 8.6 TABLET ORAL at 21:27

## 2024-05-09 RX ADMIN — Medication 100 MILLIGRAM(S): at 17:22

## 2024-05-09 RX ADMIN — Medication 40 MILLIGRAM(S): at 05:07

## 2024-05-09 RX ADMIN — Medication 1 PATCH: at 09:40

## 2024-05-09 RX ADMIN — SERTRALINE 25 MILLIGRAM(S): 25 TABLET, FILM COATED ORAL at 09:41

## 2024-05-09 RX ADMIN — Medication 100 MILLIGRAM(S): at 05:07

## 2024-05-09 RX ADMIN — Medication 3 MILLILITER(S): at 14:32

## 2024-05-09 NOTE — PROGRESS NOTE ADULT - ASSESSMENT
63 yr old female with COPD not on home oxygen, schizoaffective disorder with prior suicide attempts, smoker was sent in for evaluation of hypoxia from [facility]. She was admitted to [facility] for intentional drug overdose. IN ED, she was noted to have hypoxia, initially admitted to observation unit, she was evaluated by pulmonary and IV steroids were initiated. Psychiatry consultation requested,  constant observation was continued. She was recommended Zoloft, Trazodone.     *acute on chronic COPD w exacerbation  cont Prednisone  Not able to titrate O2 today, currently on 2 L NC   Duoneb  pulmonary following   repeat lactate and Complete course of Doxycycline.   Duoneb    *Schizoaffective disorder v. Bipolar  Psy following   Maintain 1:1   Zoloft and Zyprexa  Started Lamictal   psych recommendations noted.  likely will dc home     *DVT ppx:  Heparin

## 2024-05-09 NOTE — DISCHARGE NOTE PROVIDER - PROVIDER TOKENS
PROVIDER:[TOKEN:[[id number]],FOLLOWUP:[2 weeks]],FREE:[LAST:[psy],PHONE:[(   )    -],FAX:[(   )    -]],FREE:[LAST:[pcp],PHONE:[(   )    -],FAX:[(   )    -]]

## 2024-05-09 NOTE — DISCHARGE NOTE PROVIDER - NSDCCPCAREPLAN_GEN_ALL_CORE_FT
PRINCIPAL DISCHARGE DIAGNOSIS  Diagnosis: Schizoaffective disorder  Assessment and Plan of Treatment: Devan discharge on Zoloft, Zyprexa, Lamictal   follow up with psy      SECONDARY DISCHARGE DIAGNOSES  Diagnosis: COPD exacerbation  Assessment and Plan of Treatment: Prednisone anel   will need to follow up with pulmonology as out pt

## 2024-05-09 NOTE — DISCHARGE NOTE PROVIDER - HOSPITAL COURSE
63 yr old female with COPD not on home oxygen, schizoaffective disorder with prior suicide attempts, smoker was sent in for evaluation of hypoxia from [facility]. She was admitted to [facility] for intentional drug overdose. IN ED, she was noted to have hypoxia, initially admitted to observation unit, she was evaluated by pulmonary and IV steroids were initiated. Psychiatry consultation requested,  constant observation was continued. She was recommended Zoloft, Trazodone.     *acute on chronic COPD w exacerbation  s/p Solumedrol   will DC on Prednisone  Not able to titrate O2 today, currently on 2 L NC, will need to discharge on home O2   Duoneb  pulmonary follow up as out pt    Complete course of Doxycycline.   Duoneb    *Schizoaffective disorder v. Bipolar  Psy following   Will discharge on  Zoloft and Zyprexa  Lamictal   Vistaril as needed for sleep or anxiety   psych recommendations noted.  Cleared to dc home 63 yr old female with COPD not on home oxygen, schizoaffective disorder with prior suicide attempts, smoker was sent in for evaluation of hypoxia from [facility]. She was admitted to [facility] for intentional drug overdose. IN ED, she was noted to have hypoxia, initially admitted to observation unit, she was evaluated by pulmonary and IV steroids were initiated. Psychiatry consultation requested,  constant observation was continued. She was recommended Zoloft, Trazodone.     *acute on chronic COPD w exacerbation  s/p Solumedrol   will DC on Prednisone  Duoneb  pulmonary follow up as out pt    will send albuterol   Complete course of Doxycycline.   Duoneb    *Schizoaffective disorder v. Bipolar  Psy following   Will discharge on  Zoloft and Zyprexa  Lamictal   Vistaril as needed for sleep or anxiety   psych recommendations noted.  Cleared to dc home     Vital Signs Last 24 Hrs  T(C): 36.7 (10 May 2024 11:32), Max: 36.7 (10 May 2024 11:32)  T(F): 98.1 (10 May 2024 11:32), Max: 98.1 (10 May 2024 11:32)  HR: 90 (10 May 2024 11:32) (69 - 92)  BP: 134/80 (10 May 2024 11:32) (108/67 - 143/88)  BP(mean): --  RR: 18 (10 May 2024 11:32) (18 - 18)  SpO2: 92% (10 May 2024 11:32) (91% - 98%)    Parameters below as of 10 May 2024 11:32  Patient On (Oxygen Delivery Method): room air      PHYSICAL EXAM:    Constitutional: NAD, awake and alert, well-developed  HEENT: PERR, EOMI, Normal Hearing, MMM  Neck: Soft and supple, No LAD, No JVD  Respiratory: Breath sounds are clear bilaterally, No wheezing, rales or rhonchi  Cardiovascular: S1 and S2, regular rate and rhythm, no Murmurs, gallops or rubs  Gastrointestinal: Bowel Sounds present, soft, nontender, nondistended, no guarding, no rebound  Extremities: No peripheral edema  Vascular: 2+ peripheral pulses  Neurological: A/O x 3, no focal deficits  Musculoskeletal: 5/5 strength b/l upper and lower extremities  Skin: No rashes

## 2024-05-09 NOTE — DISCHARGE NOTE PROVIDER - CARE PROVIDER_API CALL
Nicolás Cordon  Pulmonary Disease  [address], [address]  [place] [address]  Phone: [phone]  Fax: [phone]  Follow Up Time: 2 weeks    psy,   Phone: (   )    -  Fax: (   )    -  Follow Up Time:     pcp,   Phone: (   )    -  Fax: (   )    -  Follow Up Time:

## 2024-05-09 NOTE — PROGRESS NOTE ADULT - SUBJECTIVE AND OBJECTIVE BOX
HPI  Pt is a 62yo F admitted to [facility] for COPD exacerbation  Pt was seen and examined at bedside with 1:1 at bedside. No overnight complaints.     Vital Signs Last 24 Hrs  T(C): 36.6 (10 May 2024 04:36), Max: 36.6 (09 May 2024 17:34)  T(F): 97.8 (10 May 2024 04:36), Max: 97.8 (09 May 2024 17:34)  HR: 69 (10 May 2024 04:36) (69 - 93)  BP: 108/67 (10 May 2024 04:36) (108/67 - 143/88)  BP(mean): --  RR: 18 (10 May 2024 04:36) (18 - 18)  SpO2: 98% (10 May 2024 04:36) (91% - 98%)    Parameters below as of 09 May 2024 20:50  Patient On (Oxygen Delivery Method): nasal cannula  O2 Flow (L/min): 2      I&O's Summary    09 May 2024 07:01  -  10 May 2024 07:00  --------------------------------------------------------  IN: 1280 mL / OUT: 0 mL / NET: 1280 mL        CAPILLARY BLOOD GLUCOSE          PHYSICAL EXAM:    Constitutional: NAD   HEENT: PERR, EOMI,    Neck: Soft and supple, No LAD, No JVD  Respiratory: Breath sounds are clear bilaterally, on 2L NC   Cardiovascular: S1 and S2, regular rate and rhythm, no Murmurs, gallops or rubs  Gastrointestinal: Bowel Sounds present, soft, nontender, nondistended, no guarding, no rebound  Extremities: No peripheral edema  Vascular: 2+ peripheral pulses  Neurological: A/O x 3, no focal deficits  Musculoskeletal: 5/5 strength b/l upper and lower extremities  Skin: No rashes    MEDICATIONS:  MEDICATIONS  (STANDING):  albuterol/ipratropium for Nebulization 3 milliLiter(s) Nebulizer every 6 hours  heparin   Injectable 5000 Unit(s) SubCutaneous every 12 hours  nicotine -  14 mG/24Hr(s) Patch      nicotine -  14 mG/24Hr(s) Patch 1 Patch Transdermal daily  OLANZapine 15 milliGRAM(s) Oral at bedtime  pantoprazole    Tablet 40 milliGRAM(s) Oral before breakfast  predniSONE   Tablet 40 milliGRAM(s) Oral daily  senna 2 Tablet(s) Oral at bedtime  sertraline 25 milliGRAM(s) Oral daily      LABS: All Labs Reviewed:                        13.0   14.96 )-----------( 211      ( 10 May 2024 05:35 )             39.4     05-10    141  |  102  |  24.6<H>  ----------------------------<  94  4.4   |  25.0  |  0.80    Ca    8.4      10 May 2024 05:35            Blood Culture:     RADIOLOGY/EKG:    DVT PPX:    ADVANCED DIRECTIVE:    DISPOSITION:
HPI  Pt is a 62yo F admitted to [facility] for COPD exacerbation  Pt was seen and examined at bedside with 1:1 at bedside. No overnight complaints. Attempted to wean O2, not able to tolerate today.     Vital Signs Last 24 Hrs  T(C): 36.6 (08 May 2024 04:39), Max: 36.6 (08 May 2024 04:39)  T(F): 97.8 (08 May 2024 04:39), Max: 97.8 (08 May 2024 04:39)  HR: 99 (08 May 2024 09:49) (79 - 99)  BP: 159/82 (08 May 2024 04:39) (122/77 - 159/82)  BP(mean): --  RR: 18 (08 May 2024 04:39) (18 - 18)  SpO2: 94% (08 May 2024 09:49) (91% - 100%)    Parameters below as of 08 May 2024 09:49  Patient On (Oxygen Delivery Method): nasal cannula  O2 Flow (L/min): 2      I&O's Summary    07 May 2024 07:01  -  08 May 2024 07:00  --------------------------------------------------------  IN: 800 mL / OUT: 0 mL / NET: 800 mL    08 May 2024 07:01  -  08 May 2024 10:52  --------------------------------------------------------  IN: 400 mL / OUT: 0 mL / NET: 400 mL        CAPILLARY BLOOD GLUCOSE          PHYSICAL EXAM:    Constitutional: NAD   HEENT: PERR, EOMI,    Neck: Soft and supple, No LAD, No JVD  Respiratory: Breath sounds are clear bilaterally, on 2L NC   Cardiovascular: S1 and S2, regular rate and rhythm, no Murmurs, gallops or rubs  Gastrointestinal: Bowel Sounds present, soft, nontender, nondistended, no guarding, no rebound  Extremities: No peripheral edema  Vascular: 2+ peripheral pulses  Neurological: A/O x 3, no focal deficits  Musculoskeletal: 5/5 strength b/l upper and lower extremities  Skin: No rashes  Psy: no SI or HI     MEDICATIONS:  MEDICATIONS  (STANDING):  albuterol/ipratropium for Nebulization 3 milliLiter(s) Nebulizer every 6 hours  albuterol/ipratropium for Nebulization 3 milliLiter(s) Nebulizer every 6 hours  doxycycline monohydrate Capsule 100 milliGRAM(s) Oral every 12 hours  heparin   Injectable 5000 Unit(s) SubCutaneous every 12 hours  nicotine -  14 mG/24Hr(s) Patch 1 Patch Transdermal daily  nicotine -  14 mG/24Hr(s) Patch      OLANZapine 15 milliGRAM(s) Oral at bedtime  pantoprazole    Tablet 40 milliGRAM(s) Oral before breakfast  predniSONE   Tablet 40 milliGRAM(s) Oral daily  senna 2 Tablet(s) Oral at bedtime  sertraline 25 milliGRAM(s) Oral daily      LABS: All Labs Reviewed:                        13.2   20.34 )-----------( 227      ( 08 May 2024 05:18 )             39.9     05-08    142  |  104  |  15.5  ----------------------------<  150<H>  3.5   |  25.0  |  0.65    Ca    8.9      08 May 2024 05:18  Phos  2.5     05-08  Mg     2.4     05-08            Blood Culture:     RADIOLOGY/EKG:    DVT PPX:    ADVANCED DIRECTIVE:    DISPOSITION:
PULMONARY PROGRESS NOTE      [id number]    Patient is a 63y old  Female who presents with a chief complaint of copd exacerbation (06 May 2024 12:35)      INTERVAL HPI/OVERNIGHT EVENTS:  feels sig better  no cp  no sig sputum  MEDICATIONS  (STANDING):  albuterol/ipratropium for Nebulization 3 milliLiter(s) Nebulizer every 6 hours  albuterol/ipratropium for Nebulization 3 milliLiter(s) Nebulizer every 6 hours  doxycycline monohydrate Capsule 100 milliGRAM(s) Oral every 12 hours  heparin   Injectable 5000 Unit(s) SubCutaneous every 12 hours  methylPREDNISolone sodium succinate Injectable 40 milliGRAM(s) IV Push every 8 hours  nicotine -  14 mG/24Hr(s) Patch      nicotine -  14 mG/24Hr(s) Patch 1 Patch Transdermal daily  pantoprazole    Tablet 40 milliGRAM(s) Oral before breakfast      MEDICATIONS  (PRN):  acetaminophen     Tablet .. 650 milliGRAM(s) Oral every 6 hours PRN Temp greater or equal to 38C (100.4F), Mild Pain (1 - 3)  aluminum hydroxide/magnesium hydroxide/simethicone Suspension 30 milliLiter(s) Oral every 4 hours PRN Dyspepsia  melatonin 3 milliGRAM(s) Oral at bedtime PRN Insomnia  ondansetron Injectable 4 milliGRAM(s) IV Push every 8 hours PRN Nausea and/or Vomiting      Allergies    Advil (Angioedema)    Intolerances        PAST MEDICAL & SURGICAL HISTORY:  COPD (chronic obstructive pulmonary disease)      PTSD (post-traumatic stress disorder)      Bipolar 1 disorder          SOCIAL HISTORY  Smoking History:       REVIEW OF SYSTEMS:    CONSTITUTIONAL:  No distress    HEENT:  Eyes:  No diplopia or blurred vision. ENT:  No earache, sore throat or runny nose.    CARDIOVASCULAR:  No pressure, squeezing, tightness, heaviness or aching about the chest; no palpitations.    RESPIRATORY:  see HPI    GASTROINTESTINAL:  No nausea, vomiting or diarrhea.    GENITOURINARY:  No dysuria, frequency or urgency.    NEUROLOGIC:  No paresthesias, fasciculations, seizures or weakness.    PSYCHIATRIC:  No disorder of thought or mood.    Vital Signs Last 24 Hrs  T(C): 36.5 (07 May 2024 11:11), Max: 36.9 (06 May 2024 12:45)  T(F): 97.7 (07 May 2024 11:11), Max: 98.5 (06 May 2024 12:45)  HR: 79 (07 May 2024 11:11) (79 - 103)  BP: 122/77 (07 May 2024 11:11) (122/77 - 178/92)  BP(mean): --  RR: 18 (07 May 2024 11:11) (17 - 23)  SpO2: 93% (07 May 2024 11:49) (90% - 95%)    Parameters below as of 07 May 2024 11:49  Patient On (Oxygen Delivery Method): room air        PHYSICAL EXAMINATION:    GENERAL: The patient is awake and alert in no apparent distress.     HEENT: Head is normocephalic and atraumatic. Extraocular muscles are intact. Mucous membranes are moist.    NECK: Supple.    LUNGS: mod air entry bilat ; respirations unlabored    HEART: Regular rate and rhythm without murmur.    ABDOMEN: Soft, nontender, and nondistended.      EXTREMITIES: Without any cyanosis, clubbing, rash, lesions or edema.    NEUROLOGIC: Grossly intact.    LABS:                        13.2   24.49 )-----------( 229      ( 07 May 2024 06:36 )             40.0     05-07    141  |  102  |  16.8  ----------------------------<  179<H>  4.5   |  28.0  |  0.60    Ca    8.8      07 May 2024 06:36        Urinalysis Basic - ( 07 May 2024 06:36 )    Color: x / Appearance: x / SG: x / pH: x  Gluc: 179 mg/dL / Ketone: x  / Bili: x / Urobili: x   Blood: x / Protein: x / Nitrite: x   Leuk Esterase: x / RBC: x / WBC x   Sq Epi: x / Non Sq Epi: x / Bacteria: x                Lactate, Blood: 2.2 mmol/L (05-07-24 @ 08:45)        MICROBIOLOGY:    RADIOLOGY & ADDITIONAL STUDIES:
INTERVAL HPI/OVERNIGHT EVENTS:    CC: COPD exacerbation, schizoaffective disorder.    No overnight events  feels anxious  no shortness of breath    Vital Signs Last 24 Hrs  T(C): 36.5 (07 May 2024 11:11), Max: 36.8 (06 May 2024 19:05)  T(F): 97.7 (07 May 2024 11:11), Max: 98.2 (06 May 2024 19:05)  HR: 79 (07 May 2024 11:11) (79 - 95)  BP: 122/77 (07 May 2024 11:11) (122/77 - 170/92)  BP(mean): --  RR: 18 (07 May 2024 11:11) (18 - 23)  SpO2: 93% (07 May 2024 11:49) (91% - 95%)    Parameters below as of 07 May 2024 11:49  Patient On (Oxygen Delivery Method): room air        PHYSICAL EXAM:    GENERAL: alert, flat affect, not in distress  CHEST/LUNG: b/l air entry  HEART: reg  ABDOMEN: soft, bs+, non tender  EXTREMITIES:  no edema, tenderness    MEDICATIONS  (STANDING):  albuterol/ipratropium for Nebulization 3 milliLiter(s) Nebulizer every 6 hours  albuterol/ipratropium for Nebulization 3 milliLiter(s) Nebulizer every 6 hours  doxycycline monohydrate Capsule 100 milliGRAM(s) Oral every 12 hours  heparin   Injectable 5000 Unit(s) SubCutaneous every 12 hours  nicotine -  14 mG/24Hr(s) Patch      nicotine -  14 mG/24Hr(s) Patch 1 Patch Transdermal daily  OLANZapine 15 milliGRAM(s) Oral at bedtime  pantoprazole    Tablet 40 milliGRAM(s) Oral before breakfast  predniSONE   Tablet 40 milliGRAM(s) Oral daily  sertraline 25 milliGRAM(s) Oral daily    MEDICATIONS  (PRN):  acetaminophen     Tablet .. 650 milliGRAM(s) Oral every 6 hours PRN Temp greater or equal to 38C (100.4F), Mild Pain (1 - 3)  aluminum hydroxide/magnesium hydroxide/simethicone Suspension 30 milliLiter(s) Oral every 4 hours PRN Dyspepsia  melatonin 3 milliGRAM(s) Oral at bedtime PRN Insomnia  ondansetron Injectable 4 milliGRAM(s) IV Push every 8 hours PRN Nausea and/or Vomiting      Allergies    Advil (Angioedema)    Intolerances          LABS:                          13.2   24.49 )-----------( 229      ( 07 May 2024 06:36 )             40.0     05-07    141  |  102  |  16.8  ----------------------------<  179<H>  4.5   |  28.0  |  0.60    Ca    8.8      07 May 2024 06:36        Urinalysis Basic - ( 07 May 2024 06:36 )    Color: x / Appearance: x / SG: x / pH: x  Gluc: 179 mg/dL / Ketone: x  / Bili: x / Urobili: x   Blood: x / Protein: x / Nitrite: x   Leuk Esterase: x / RBC: x / WBC x   Sq Epi: x / Non Sq Epi: x / Bacteria: x        RADIOLOGY & ADDITIONAL TESTS:

## 2024-05-09 NOTE — DISCHARGE NOTE PROVIDER - NSDCMRMEDTOKEN_GEN_ALL_CORE_FT
Albuterol (Eqv-Ventolin HFA) 90 mcg/inh inhalation aerosol: 2 puff(s) inhaled every 4 hours as needed for  shortness of breath and/or wheezing  Desyrel 50 mg oral tablet: 1 tab(s) orally once a day (at bedtime) as needed for prn for sleep  FLUoxetine 10 mg oral capsule: 1 cap(s) orally once a day  ZyPREXA 15 mg oral tablet: 1 tab(s) orally once a day Morning   Albuterol (Eqv-Ventolin HFA) 90 mcg/inh inhalation aerosol: 2 puff(s) inhaled every 4 hours as needed for  shortness of breath and/or wheezing  pantoprazole 40 mg oral delayed release tablet: 1 tab(s) orally once a day (before a meal)  predniSONE 20 mg oral tablet: 2 tab(s) orally once a day  sertraline 25 mg oral tablet: 1 tab(s) orally once a day  ZyPREXA 15 mg oral tablet: 1 tab(s) orally once a day Morning   Albuterol (Eqv-Ventolin HFA) 90 mcg/inh inhalation aerosol: 2 puff(s) inhaled every 4 hours as needed for  shortness of breath and/or wheezing  albuterol 2.5 mg/3 mL (0.083%) inhalation solution: 3 milliliter(s) by nebulizer every 6 hours as needed for  cough  pantoprazole 40 mg oral delayed release tablet: 1 tab(s) orally once a day (before a meal)  predniSONE 20 mg oral tablet: 2 tab(s) orally once a day  sertraline 25 mg oral tablet: 1 tab(s) orally once a day  ZyPREXA 15 mg oral tablet: 1 tab(s) orally once a day Morning

## 2024-05-10 ENCOUNTER — TRANSCRIPTION ENCOUNTER (OUTPATIENT)
Age: 64
End: 2024-05-10

## 2024-05-10 ENCOUNTER — NON-APPOINTMENT (OUTPATIENT)
Age: 64
End: 2024-05-10

## 2024-05-10 VITALS
TEMPERATURE: 98 F | HEART RATE: 95 BPM | RESPIRATION RATE: 18 BRPM | DIASTOLIC BLOOD PRESSURE: 73 MMHG | OXYGEN SATURATION: 91 % | SYSTOLIC BLOOD PRESSURE: 134 MMHG

## 2024-05-10 PROBLEM — Z00.00 ENCOUNTER FOR PREVENTIVE HEALTH EXAMINATION: Status: ACTIVE | Noted: 2024-05-10

## 2024-05-10 LAB
ANION GAP SERPL CALC-SCNC: 14 MMOL/L — SIGNIFICANT CHANGE UP (ref 5–17)
BUN SERPL-MCNC: 24.6 MG/DL — HIGH (ref 8–20)
CALCIUM SERPL-MCNC: 8.4 MG/DL — SIGNIFICANT CHANGE UP (ref 8.4–10.5)
CHLORIDE SERPL-SCNC: 102 MMOL/L — SIGNIFICANT CHANGE UP (ref 96–108)
CO2 SERPL-SCNC: 25 MMOL/L — SIGNIFICANT CHANGE UP (ref 22–29)
CREAT SERPL-MCNC: 0.8 MG/DL — SIGNIFICANT CHANGE UP (ref 0.5–1.3)
EGFR: 83 ML/MIN/1.73M2 — SIGNIFICANT CHANGE UP
GLUCOSE SERPL-MCNC: 94 MG/DL — SIGNIFICANT CHANGE UP (ref 70–99)
HCT VFR BLD CALC: 39.4 % — SIGNIFICANT CHANGE UP (ref 34.5–45)
HGB BLD-MCNC: 13 G/DL — SIGNIFICANT CHANGE UP (ref 11.5–15.5)
MCHC RBC-ENTMCNC: 28.8 PG — SIGNIFICANT CHANGE UP (ref 27–34)
MCHC RBC-ENTMCNC: 33 GM/DL — SIGNIFICANT CHANGE UP (ref 32–36)
MCV RBC AUTO: 87.4 FL — SIGNIFICANT CHANGE UP (ref 80–100)
PLATELET # BLD AUTO: 211 K/UL — SIGNIFICANT CHANGE UP (ref 150–400)
POTASSIUM SERPL-MCNC: 4.4 MMOL/L — SIGNIFICANT CHANGE UP (ref 3.5–5.3)
POTASSIUM SERPL-SCNC: 4.4 MMOL/L — SIGNIFICANT CHANGE UP (ref 3.5–5.3)
RBC # BLD: 4.51 M/UL — SIGNIFICANT CHANGE UP (ref 3.8–5.2)
RBC # FLD: 13.4 % — SIGNIFICANT CHANGE UP (ref 10.3–14.5)
SODIUM SERPL-SCNC: 141 MMOL/L — SIGNIFICANT CHANGE UP (ref 135–145)
WBC # BLD: 14.96 K/UL — HIGH (ref 3.8–10.5)
WBC # FLD AUTO: 14.96 K/UL — HIGH (ref 3.8–10.5)

## 2024-05-10 PROCEDURE — 94760 N-INVAS EAR/PLS OXIMETRY 1: CPT

## 2024-05-10 PROCEDURE — 93005 ELECTROCARDIOGRAM TRACING: CPT

## 2024-05-10 PROCEDURE — 80307 DRUG TEST PRSMV CHEM ANLYZR: CPT

## 2024-05-10 PROCEDURE — 83605 ASSAY OF LACTIC ACID: CPT

## 2024-05-10 PROCEDURE — 71045 X-RAY EXAM CHEST 1 VIEW: CPT

## 2024-05-10 PROCEDURE — 80076 HEPATIC FUNCTION PANEL: CPT

## 2024-05-10 PROCEDURE — 83735 ASSAY OF MAGNESIUM: CPT

## 2024-05-10 PROCEDURE — 82330 ASSAY OF CALCIUM: CPT

## 2024-05-10 PROCEDURE — 80053 COMPREHEN METABOLIC PANEL: CPT

## 2024-05-10 PROCEDURE — 84484 ASSAY OF TROPONIN QUANT: CPT

## 2024-05-10 PROCEDURE — G0378: CPT

## 2024-05-10 PROCEDURE — 80048 BASIC METABOLIC PNL TOTAL CA: CPT

## 2024-05-10 PROCEDURE — 82803 BLOOD GASES ANY COMBINATION: CPT

## 2024-05-10 PROCEDURE — 82947 ASSAY GLUCOSE BLOOD QUANT: CPT

## 2024-05-10 PROCEDURE — 85018 HEMOGLOBIN: CPT

## 2024-05-10 PROCEDURE — 36415 COLL VENOUS BLD VENIPUNCTURE: CPT

## 2024-05-10 PROCEDURE — 87637 SARSCOV2&INF A&B&RSV AMP PRB: CPT

## 2024-05-10 PROCEDURE — 99285 EMERGENCY DEPT VISIT HI MDM: CPT

## 2024-05-10 PROCEDURE — 99239 HOSP IP/OBS DSCHRG MGMT >30: CPT

## 2024-05-10 PROCEDURE — 99232 SBSQ HOSP IP/OBS MODERATE 35: CPT

## 2024-05-10 PROCEDURE — 96375 TX/PRO/DX INJ NEW DRUG ADDON: CPT

## 2024-05-10 PROCEDURE — 85025 COMPLETE CBC W/AUTO DIFF WBC: CPT

## 2024-05-10 PROCEDURE — 96374 THER/PROPH/DIAG INJ IV PUSH: CPT

## 2024-05-10 PROCEDURE — 82435 ASSAY OF BLOOD CHLORIDE: CPT

## 2024-05-10 PROCEDURE — 84132 ASSAY OF SERUM POTASSIUM: CPT

## 2024-05-10 PROCEDURE — 85379 FIBRIN DEGRADATION QUANT: CPT

## 2024-05-10 PROCEDURE — 85027 COMPLETE CBC AUTOMATED: CPT

## 2024-05-10 PROCEDURE — 82962 GLUCOSE BLOOD TEST: CPT

## 2024-05-10 PROCEDURE — 84295 ASSAY OF SERUM SODIUM: CPT

## 2024-05-10 PROCEDURE — 81003 URINALYSIS AUTO W/O SCOPE: CPT

## 2024-05-10 PROCEDURE — 83880 ASSAY OF NATRIURETIC PEPTIDE: CPT

## 2024-05-10 PROCEDURE — 85014 HEMATOCRIT: CPT

## 2024-05-10 PROCEDURE — 94640 AIRWAY INHALATION TREATMENT: CPT

## 2024-05-10 PROCEDURE — 84100 ASSAY OF PHOSPHORUS: CPT

## 2024-05-10 RX ORDER — ALBUTEROL 90 UG/1
3 AEROSOL, METERED ORAL
Qty: 1 | Refills: 0
Start: 2024-05-10 | End: 2024-05-23

## 2024-05-10 RX ORDER — ALBUTEROL 90 UG/1
2 AEROSOL, METERED ORAL
Qty: 1 | Refills: 0
Start: 2024-05-10 | End: 2024-06-08

## 2024-05-10 RX ORDER — ALBUTEROL 90 UG/1
2 AEROSOL, METERED ORAL
Refills: 0 | DISCHARGE

## 2024-05-10 RX ORDER — PANTOPRAZOLE SODIUM 20 MG/1
1 TABLET, DELAYED RELEASE ORAL
Qty: 30 | Refills: 0
Start: 2024-05-10 | End: 2024-06-08

## 2024-05-10 RX ORDER — TRAZODONE HCL 50 MG
1 TABLET ORAL
Refills: 0 | DISCHARGE

## 2024-05-10 RX ORDER — SERTRALINE 25 MG/1
1 TABLET, FILM COATED ORAL
Qty: 30 | Refills: 0
Start: 2024-05-10 | End: 2024-06-08

## 2024-05-10 RX ADMIN — Medication 100 MILLIGRAM(S): at 06:15

## 2024-05-10 RX ADMIN — PANTOPRAZOLE SODIUM 40 MILLIGRAM(S): 20 TABLET, DELAYED RELEASE ORAL at 06:15

## 2024-05-10 RX ADMIN — HEPARIN SODIUM 5000 UNIT(S): 5000 INJECTION INTRAVENOUS; SUBCUTANEOUS at 06:15

## 2024-05-10 RX ADMIN — Medication 40 MILLIGRAM(S): at 06:15

## 2024-05-10 RX ADMIN — Medication 3 MILLIGRAM(S): at 00:35

## 2024-05-10 RX ADMIN — Medication 3 MILLILITER(S): at 09:00

## 2024-05-10 RX ADMIN — SERTRALINE 25 MILLIGRAM(S): 25 TABLET, FILM COATED ORAL at 11:43
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
## 2024-05-10 NOTE — BH CONSULTATION LIAISON PROGRESS NOTE - NSBHASSESSMENTFT_PSY_ALL_CORE
Patient is a 63 year old female who is currently on SSD, living with her Fiance of over 20 years, with a psychiatric history of schizoaffective disorder,  2 prior suicide attempt by OD, and [facility] who was originally evaluated by [facility] ED for OD and sent to [facility] for psychiatric admission and now sent back and medically admitted for hypoxia.     Patient mood/affect brighter today, reports mood improved after speaking to significant other.  She denies any AVH or paranoia when asked directly. She continues to deny and SI/HI, intent or plan when asked directly.   Patient no longer wants to go to inpatient psychiatry, reports she would like to return home.  Discussed with patient that psych will continue to follow and discharge contingent on both medical and psychiatric clearance.     Recs   zoloft 25mg po qdaily  continue zyprexa 15mg po qHS  add lamictal 25mg po qdaily - patient consents. discussed potential s/e profile and risk for SJS- patient verbalized understanding of aforementioned  continue 1:1 obs  psychiatry to follow 
Patient is a 63 year old female who is currently on SSD, living with her Fiance of over 20 years, with a psychiatric history of schizoaffective disorder,  2 prior suicide attempt by OD, and currently following with [facility] who was originally evaluated by [facility] ED for OD and sent to [facility] for psychiatric admission and now sent back and medically admitted for hypoxia.     Patient mood/affect depressed, tearful during interview.  Patient denies recent event was suicide attempt, but does relay feeling triggered by recent events.  She reports sleep and appetite overall adequate.  She denies any AVH or paranoia when asked directly. She continues to deny and SI/HI, intent or plan when asked directly.   Patient does not like the way that prozac makes her feel.  She is agreeable to change SSRI- discussed trialing zoloft and potentially adding a mood stabilizer.  Discussed recommendation to return to inpatient psych when medically cleared.    Recs  d/c prozac  add zoloft 25mg po qdaily  continue zyprexa 15mg po qHS  consider adding mood stabilizer  continue 1:1 obs  psychiatry to follow 
Patient is a 63 year old female who is currently on SSD, living with her Fiance of over 20 years, with a psychiatric history of schizoaffective disorder,  2 prior suicide attempt by OD, and currently following with [facility] who was originally evaluated by [facility] ED for OD and sent to [facility] for psychiatric admission and now sent back and medically admitted for hypoxia.     Patient mood/affect euthymic.  She reports motivation to go home today and reports looking forward to seeing her significant other.  She is future oriented, remains hopeful she may get a job in the future.   She denies any AVH or paranoia when asked directly. She continues to deny any SI/HI, intent or plan when asked directly.   Safety plan complete.  From a psychiatric standpoint, patient cleared for d/c.     Recs   zoloft 25mg po qdaily  continue zyprexa 15mg po qHS  --> AIMS prior to d/c is negative  continue lamictal 25mg po qdaily - patient consents  d/c vistaril - patient has not taken   please print safety plan and give patient a copy  recs discussed with Dr. Dumont  
Patient is a 63 year old female who is currently on SSD, living with her Fiance of over 20 years, with a psychiatric history of schizoaffective disorder,  2 prior suicide attempt by OD, and [facility] who was originally evaluated by [facility] ED for OD and sent to [facility] for psychiatric admission and now sent back and medically admitted for hypoxia.     Patient mood/affect brighter today, reports mood improved after speaking to significant other.  She denies any AVH or paranoia when asked directly. She continues to deny and SI/HI, intent or plan when asked directly.   Patient no longer wants to go to inpatient psychiatry, reports she would like to return home.  Discussed with patient that psych will continue to follow and discharge contingent on both medical and psychiatric clearance.     Recs   zoloft 25mg po qdaily  continue zyprexa 15mg po qHS  continue lamictal 25mg po qdaily - patient consents  d/c melatonin 3mg PRN   add vistaril 25mgpo BID PRN for anxiety or insomnia  continue 1:1 obs  psychiatry to follow     patient requesting to shower- cleared from psych standpoint

## 2024-05-10 NOTE — BH CONSULTATION LIAISON PROGRESS NOTE - NSBHATTESTAPPBILLTIME_PSY_A_CORE
I attest my time as ROXIE is greater than 50% of the total combined time spent on qualifying patient care activities. I have reviewed and verified the documentation.

## 2024-05-10 NOTE — BH CONSULTATION LIAISON PROGRESS NOTE - NSBHFUPINTERVALCCFT_PSY_A_CORE
"I just wanted to sleep"
"I just wanted to sleep"
"I feel relaxed and patient"
"I'm anxious and I didn't sleep"

## 2024-05-10 NOTE — BH CONSULTATION LIAISON PROGRESS NOTE - NSBHMSEMOVE_PSY_A_CORE
[FreeTextEntry1] : 49-year-old gentleman with pain in his LEFT shoulder going on for about 2-1/2 months now.  MRI showed chondral wear seen on the glenoid and subchondral cysts. There is degeneration of the biceps tendon and rotator cuff. There is a small partial tear of the superior fibers of the subscapularis and probably some degeneration or possible tearing of the biceps tendon and the superior labrum.\par some of this may be acute from the fall or he may have just aggravated the underlying changes that were there.\par \par If he has ongoing pain and there may be an indication to proceed at some point with surgery which may include a debridement, biceps tenodesis and possible repair of the subscapularis. His a.c. joint which looked inflamed on MRI was nontender today\par Followup in about 5-6 weeks to check on his progress and see if the shoulder is improving.
No abnormal movements

## 2024-05-10 NOTE — BH CONSULTATION LIAISON PROGRESS NOTE - NSBHATTESTTYPEVISIT_PSY_A_CORE
On-site Attending supervising ROXIE (99XXX codes)
On-site Attending supervising RXOIE (99XXX codes)

## 2024-05-10 NOTE — BH CONSULTATION LIAISON PROGRESS NOTE - NSBHCHARTREVIEWLAB_PSY_A_CORE FT
Basic Metabolic Panel in AM (05.09.24 @ 05:46)   Sodium: 141 mmol/L  Potassium: 3.8 mmol/L  Chloride: 103: Chloride reference range changed from ..10/26/2022 mmol/L  Carbon Dioxide: 27.0 mmol/L  Anion Gap: 11 mmol/L  Blood Urea Nitrogen: 22.2 mg/dL  Creatinine: 0.72 mg/dL  Glucose: 96 mg/dL  Calcium: 8.7 mg/dL  eGFR: 94: The estimated glomerular filtration rate (eGFR) is calculated using the   2021 CKD-EPI creatinine equation, which does not have a coefficient for   race and is validated in individuals 18 years of age and older (N Engl J   Med 2021; 385:2619-7169). Creatinine-based eGFR may be inaccurate in   various situations including but not limited to extremes of muscle mass,   altered dietary protein intake, or medications that affect renal tubular   creatinine secretion. mL/min/1.73m2  
Basic Metabolic Panel in AM (05.08.24 @ 05:18)   Sodium: 142 mmol/L  Potassium: 3.5 mmol/L  Chloride: 104: Chloride reference range changed from ..10/26/2022 mmol/L  Carbon Dioxide: 25.0 mmol/L  Anion Gap: 13 mmol/L  Blood Urea Nitrogen: 15.5 mg/dL  Creatinine: 0.65 mg/dL  Glucose: 150 mg/dL  Calcium: 8.9 mg/dL  eGFR: 99: The estimated glomerular filtration rate (eGFR) is calculated using the   2021 CKD-EPI creatinine equation, which does not have a coefficient for   race and is validated in individuals 18 years of age and older (N Engl J   Med 2021; 385:9088-2967). Creatinine-based eGFR may be inaccurate in   various situations including but not limited to extremes of muscle mass,   altered dietary protein intake, or medications that affect renal tubular   creatinine secretion. mL/min/1.73m2  
Basic Metabolic Panel in AM (05.09.24 @ 05:46)   Sodium: 141 mmol/L  Potassium: 3.8 mmol/L  Chloride: 103: Chloride reference range changed from ..10/26/2022 mmol/L  Carbon Dioxide: 27.0 mmol/L  Anion Gap: 11 mmol/L  Blood Urea Nitrogen: 22.2 mg/dL  Creatinine: 0.72 mg/dL  Glucose: 96 mg/dL  Calcium: 8.7 mg/dL  eGFR: 94: The estimated glomerular filtration rate (eGFR) is calculated using the   2021 CKD-EPI creatinine equation, which does not have a coefficient for   race and is validated in individuals 18 years of age and older (N Engl J   Med 2021; 385:6376-3001). Creatinine-based eGFR may be inaccurate in   various situations including but not limited to extremes of muscle mass,   altered dietary protein intake, or medications that affect renal tubular   creatinine secretion. mL/min/1.73m2

## 2024-05-10 NOTE — DISCHARGE NOTE NURSING/CASE MANAGEMENT/SOCIAL WORK - NSDCFUADDAPPT_GEN_ALL_CORE_FT
[facility]:   *Appointment is via phone*  Appointment Date: 5/13  Appointment Time: 1pm  You will be called at: #[phone] [address] [place] [id number] [phone]

## 2024-05-10 NOTE — BH CONSULTATION LIAISON PROGRESS NOTE - NSBHCONSULTFOLLOWAFTERCARE_PSY_A_CORE FT
f/u with [facility] appointment  f/u with [facility] as it will likely require several appointments to establish care with [facility].    **Patient educated on recommendations and verbalizes understanding/agreement

## 2024-05-10 NOTE — DISCHARGE NOTE NURSING/CASE MANAGEMENT/SOCIAL WORK - NSDCPEFALRISK_GEN_ALL_CORE
For information on Fall & Injury Prevention, visit: [email or web address] OR  [email or web address] OR  https://www.cdc.gov/steadi/patient.html

## 2024-05-10 NOTE — BH CONSULTATION LIAISON PROGRESS NOTE - CURRENT MEDICATION
MEDICATIONS  (STANDING):  albuterol/ipratropium for Nebulization 3 milliLiter(s) Nebulizer every 6 hours  albuterol/ipratropium for Nebulization 3 milliLiter(s) Nebulizer every 6 hours  doxycycline monohydrate Capsule 100 milliGRAM(s) Oral every 12 hours  heparin   Injectable 5000 Unit(s) SubCutaneous every 12 hours  methylPREDNISolone sodium succinate Injectable 40 milliGRAM(s) IV Push every 8 hours  nicotine -  14 mG/24Hr(s) Patch      nicotine -  14 mG/24Hr(s) Patch 1 Patch Transdermal daily  pantoprazole    Tablet 40 milliGRAM(s) Oral before breakfast    MEDICATIONS  (PRN):  acetaminophen     Tablet .. 650 milliGRAM(s) Oral every 6 hours PRN Temp greater or equal to 38C (100.4F), Mild Pain (1 - 3)  aluminum hydroxide/magnesium hydroxide/simethicone Suspension 30 milliLiter(s) Oral every 4 hours PRN Dyspepsia  melatonin 3 milliGRAM(s) Oral at bedtime PRN Insomnia  ondansetron Injectable 4 milliGRAM(s) IV Push every 8 hours PRN Nausea and/or Vomiting  
MEDICATIONS  (STANDING):  albuterol/ipratropium for Nebulization 3 milliLiter(s) Nebulizer every 6 hours  albuterol/ipratropium for Nebulization 3 milliLiter(s) Nebulizer every 6 hours  doxycycline monohydrate Capsule 100 milliGRAM(s) Oral every 12 hours  heparin   Injectable 5000 Unit(s) SubCutaneous every 12 hours  nicotine -  14 mG/24Hr(s) Patch      nicotine -  14 mG/24Hr(s) Patch 1 Patch Transdermal daily  OLANZapine 15 milliGRAM(s) Oral at bedtime  pantoprazole    Tablet 40 milliGRAM(s) Oral before breakfast  predniSONE   Tablet 40 milliGRAM(s) Oral daily  senna 2 Tablet(s) Oral at bedtime  sertraline 25 milliGRAM(s) Oral daily    MEDICATIONS  (PRN):  acetaminophen     Tablet .. 650 milliGRAM(s) Oral every 6 hours PRN Temp greater or equal to 38C (100.4F), Mild Pain (1 - 3)  aluminum hydroxide/magnesium hydroxide/simethicone Suspension 30 milliLiter(s) Oral every 4 hours PRN Dyspepsia  melatonin 3 milliGRAM(s) Oral at bedtime PRN Insomnia  ondansetron Injectable 4 milliGRAM(s) IV Push every 8 hours PRN Nausea and/or Vomiting  
MEDICATIONS  (STANDING):  albuterol/ipratropium for Nebulization 3 milliLiter(s) Nebulizer every 6 hours  doxycycline monohydrate Capsule 100 milliGRAM(s) Oral every 12 hours  heparin   Injectable 5000 Unit(s) SubCutaneous every 12 hours  nicotine -  14 mG/24Hr(s) Patch      nicotine -  14 mG/24Hr(s) Patch 1 Patch Transdermal daily  OLANZapine 15 milliGRAM(s) Oral at bedtime  pantoprazole    Tablet 40 milliGRAM(s) Oral before breakfast  predniSONE   Tablet 40 milliGRAM(s) Oral daily  senna 2 Tablet(s) Oral at bedtime  sertraline 25 milliGRAM(s) Oral daily    MEDICATIONS  (PRN):  acetaminophen     Tablet .. 650 milliGRAM(s) Oral every 6 hours PRN Temp greater or equal to 38C (100.4F), Mild Pain (1 - 3)  aluminum hydroxide/magnesium hydroxide/simethicone Suspension 30 milliLiter(s) Oral every 4 hours PRN Dyspepsia  melatonin 3 milliGRAM(s) Oral at bedtime PRN Insomnia  ondansetron Injectable 4 milliGRAM(s) IV Push every 8 hours PRN Nausea and/or Vomiting  
MEDICATIONS  (STANDING):  albuterol/ipratropium for Nebulization 3 milliLiter(s) Nebulizer every 6 hours  heparin   Injectable 5000 Unit(s) SubCutaneous every 12 hours  nicotine -  14 mG/24Hr(s) Patch      nicotine -  14 mG/24Hr(s) Patch 1 Patch Transdermal daily  OLANZapine 15 milliGRAM(s) Oral at bedtime  pantoprazole    Tablet 40 milliGRAM(s) Oral before breakfast  predniSONE   Tablet 40 milliGRAM(s) Oral daily  senna 2 Tablet(s) Oral at bedtime  sertraline 25 milliGRAM(s) Oral daily    MEDICATIONS  (PRN):  acetaminophen     Tablet .. 650 milliGRAM(s) Oral every 6 hours PRN Temp greater or equal to 38C (100.4F), Mild Pain (1 - 3)  aluminum hydroxide/magnesium hydroxide/simethicone Suspension 30 milliLiter(s) Oral every 4 hours PRN Dyspepsia  hydrOXYzine hydrochloride 25 milliGRAM(s) Oral two times a day PRN Anxiety or insomnia  melatonin 3 milliGRAM(s) Oral at bedtime PRN Insomnia  ondansetron Injectable 4 milliGRAM(s) IV Push every 8 hours PRN Nausea and/or Vomiting

## 2024-05-10 NOTE — DISCHARGE NOTE NURSING/CASE MANAGEMENT/SOCIAL WORK - NSDCPEEMAIL_GEN_ALL_CORE
[facility] for Tobacco Control email [email or web address]

## 2024-05-10 NOTE — DISCHARGE NOTE NURSING/CASE MANAGEMENT/SOCIAL WORK - PATIENT PORTAL LINK FT
You can access the FollowMyHealth Patient Portal offered by [facility] by registering at the following website: [email or web address]. By joining [employer]’s FollowMyHealth portal, you will also be able to view your health information using other applications (apps) compatible with our system.

## 2024-05-10 NOTE — DISCHARGE NOTE NURSING/CASE MANAGEMENT/SOCIAL WORK - NSDCPEWEB_GEN_ALL_CORE
[facility] for Tobacco Control website --- [email or web address] website --- [email or web address]

## 2024-05-10 NOTE — BH CONSULTATION LIAISON PROGRESS NOTE - NSBHCHARTREVIEWVS_PSY_A_CORE FT
Vital Signs Last 24 Hrs  T(C): 36.1 (09 May 2024 10:23), Max: 36.9 (08 May 2024 16:16)  T(F): 97 (09 May 2024 10:23), Max: 98.5 (08 May 2024 16:16)  HR: 93 (09 May 2024 10:23) (61 - 100)  BP: 138/86 (09 May 2024 10:23) (124/75 - 143/78)  BP(mean): --  RR: 18 (09 May 2024 10:23) (18 - 18)  SpO2: 97% (09 May 2024 10:23) (92% - 97%)    Parameters below as of 09 May 2024 10:23  Patient On (Oxygen Delivery Method): nasal cannula  O2 Flow (L/min): 2  
Vital Signs Last 24 Hrs  T(C): 36.6 (08 May 2024 10:58), Max: 36.6 (08 May 2024 04:39)  T(F): 97.9 (08 May 2024 10:58), Max: 97.9 (08 May 2024 10:58)  HR: 86 (08 May 2024 10:58) (86 - 99)  BP: 147/81 (08 May 2024 10:58) (139/75 - 159/82)  BP(mean): --  RR: 16 (08 May 2024 10:58) (16 - 18)  SpO2: 92% (08 May 2024 10:58) (91% - 100%)    Parameters below as of 08 May 2024 10:58  Patient On (Oxygen Delivery Method): nasal cannula    
Vital Signs Last 24 Hrs  T(C): 36.5 (07 May 2024 11:11), Max: 36.9 (06 May 2024 12:45)  T(F): 97.7 (07 May 2024 11:11), Max: 98.5 (06 May 2024 12:45)  HR: 79 (07 May 2024 11:11) (79 - 103)  BP: 122/77 (07 May 2024 11:11) (122/77 - 178/92)  BP(mean): --  RR: 18 (07 May 2024 11:11) (17 - 23)  SpO2: 91% (07 May 2024 11:11) (90% - 95%)    Parameters below as of 07 May 2024 11:11  Patient On (Oxygen Delivery Method): nasal cannula  O2 Flow (L/min): 2  
Vital Signs Last 24 Hrs  T(C): 36.7 (10 May 2024 11:32), Max: 36.7 (10 May 2024 11:32)  T(F): 98.1 (10 May 2024 11:32), Max: 98.1 (10 May 2024 11:32)  HR: 90 (10 May 2024 11:32) (69 - 92)  BP: 134/80 (10 May 2024 11:32) (108/67 - 143/88)  BP(mean): --  RR: 18 (10 May 2024 11:32) (18 - 18)  SpO2: 92% (10 May 2024 11:32) (91% - 98%)    Parameters below as of 10 May 2024 11:32  Patient On (Oxygen Delivery Method): room air

## 2024-05-13 LAB — GLUCOSE BLDC GLUCOMTR-MCNC: 135 MG/DL — HIGH (ref 70–99)

## 2024-06-13 ENCOUNTER — INPATIENT (INPATIENT)
Facility: HOSPITAL | Age: 64
LOS: 5 days | Discharge: HOME | DRG: 917 | End: 2024-06-19
Attending: STUDENT IN AN ORGANIZED HEALTH CARE EDUCATION/TRAINING PROGRAM | Admitting: INTERNAL MEDICINE
Payer: MEDICARE

## 2024-06-13 ENCOUNTER — RESULT REVIEW (OUTPATIENT)
Age: 64
End: 2024-06-13

## 2024-06-13 VITALS
DIASTOLIC BLOOD PRESSURE: 106 MMHG | SYSTOLIC BLOOD PRESSURE: 146 MMHG | RESPIRATION RATE: 17 BRPM | TEMPERATURE: 97 F | OXYGEN SATURATION: 99 % | HEART RATE: 142 BPM | HEIGHT: 70 IN

## 2024-06-13 DIAGNOSIS — R41.82 ALTERED MENTAL STATUS, UNSPECIFIED: ICD-10-CM

## 2024-06-13 LAB
ALBUMIN SERPL ELPH-MCNC: 4.5 G/DL — SIGNIFICANT CHANGE UP (ref 3.3–5.2)
ALP SERPL-CCNC: 86 U/L — SIGNIFICANT CHANGE UP (ref 40–120)
ALT FLD-CCNC: 20 U/L — SIGNIFICANT CHANGE UP
AMMONIA BLD-MCNC: 40 UMOL/L — SIGNIFICANT CHANGE UP (ref 11–55)
AMPHET UR-MCNC: NEGATIVE — SIGNIFICANT CHANGE UP
ANION GAP SERPL CALC-SCNC: 19 MMOL/L — HIGH (ref 5–17)
APAP SERPL-MCNC: <3 UG/ML — LOW (ref 10–26)
APPEARANCE CSF: CLEAR — SIGNIFICANT CHANGE UP
APPEARANCE SPUN FLD: COLORLESS — SIGNIFICANT CHANGE UP
APPEARANCE UR: CLEAR — SIGNIFICANT CHANGE UP
AST SERPL-CCNC: 22 U/L — SIGNIFICANT CHANGE UP
BARBITURATES UR SCN-MCNC: NEGATIVE — SIGNIFICANT CHANGE UP
BASE EXCESS BLDV CALC-SCNC: 0.7 MMOL/L — SIGNIFICANT CHANGE UP (ref -2–3)
BASOPHILS # BLD AUTO: 0.15 K/UL — SIGNIFICANT CHANGE UP (ref 0–0.2)
BASOPHILS NFR BLD AUTO: 0.7 % — SIGNIFICANT CHANGE UP (ref 0–2)
BENZODIAZ UR-MCNC: NEGATIVE — SIGNIFICANT CHANGE UP
BILIRUB SERPL-MCNC: 0.4 MG/DL — SIGNIFICANT CHANGE UP (ref 0.4–2)
BILIRUB UR-MCNC: NEGATIVE — SIGNIFICANT CHANGE UP
BUN SERPL-MCNC: 11.4 MG/DL — SIGNIFICANT CHANGE UP (ref 8–20)
CA-I SERPL-SCNC: 1.16 MMOL/L — SIGNIFICANT CHANGE UP (ref 1.15–1.33)
CALCIUM SERPL-MCNC: 9.5 MG/DL — SIGNIFICANT CHANGE UP (ref 8.4–10.5)
CHLORIDE BLDV-SCNC: 100 MMOL/L — SIGNIFICANT CHANGE UP (ref 96–108)
CHLORIDE SERPL-SCNC: 95 MMOL/L — LOW (ref 96–108)
CO2 SERPL-SCNC: 23 MMOL/L — SIGNIFICANT CHANGE UP (ref 22–29)
COCAINE METAB.OTHER UR-MCNC: NEGATIVE — SIGNIFICANT CHANGE UP
COLOR CSF: SIGNIFICANT CHANGE UP
COLOR SPEC: YELLOW — SIGNIFICANT CHANGE UP
CREAT SERPL-MCNC: 0.71 MG/DL — SIGNIFICANT CHANGE UP (ref 0.5–1.3)
CRYPTOC AG CSF-ACNC: NEGATIVE — SIGNIFICANT CHANGE UP
DIFF PNL FLD: NEGATIVE — SIGNIFICANT CHANGE UP
EGFR: 95 ML/MIN/1.73M2 — SIGNIFICANT CHANGE UP
EOSINOPHIL # BLD AUTO: 0.14 K/UL — SIGNIFICANT CHANGE UP (ref 0–0.5)
EOSINOPHIL NFR BLD AUTO: 0.6 % — SIGNIFICANT CHANGE UP (ref 0–6)
ETHANOL SERPL-MCNC: <10 MG/DL — SIGNIFICANT CHANGE UP (ref 0–9)
FENTANYL UR QL SCN: NEGATIVE — SIGNIFICANT CHANGE UP
FLUAV AG NPH QL: SIGNIFICANT CHANGE UP
FLUBV AG NPH QL: SIGNIFICANT CHANGE UP
GAS PNL BLDA: SIGNIFICANT CHANGE UP
GAS PNL BLDA: SIGNIFICANT CHANGE UP
GAS PNL BLDV: 135 MMOL/L — LOW (ref 136–145)
GAS PNL BLDV: SIGNIFICANT CHANGE UP
GLUCOSE BLDV-MCNC: 213 MG/DL — HIGH (ref 70–99)
GLUCOSE CSF-MCNC: 101 MG/DLG/24H — HIGH (ref 40–70)
GLUCOSE SERPL-MCNC: 206 MG/DL — HIGH (ref 70–99)
GLUCOSE UR QL: NEGATIVE MG/DL — SIGNIFICANT CHANGE UP
GRAM STN FLD: SIGNIFICANT CHANGE UP
GRAM STN FLD: SIGNIFICANT CHANGE UP
HCO3 BLDV-SCNC: 26 MMOL/L — SIGNIFICANT CHANGE UP (ref 22–29)
HCT VFR BLD CALC: 44.7 % — SIGNIFICANT CHANGE UP (ref 34.5–45)
HCT VFR BLDA CALC: 47 % — SIGNIFICANT CHANGE UP
HGB BLD CALC-MCNC: 15.8 G/DL — SIGNIFICANT CHANGE UP (ref 11.7–16.1)
HGB BLD-MCNC: 15 G/DL — SIGNIFICANT CHANGE UP (ref 11.5–15.5)
IMM GRANULOCYTES NFR BLD AUTO: 1.1 % — HIGH (ref 0–0.9)
KETONES UR-MCNC: NEGATIVE MG/DL — SIGNIFICANT CHANGE UP
LACTATE BLDV-MCNC: 1.7 MMOL/L — SIGNIFICANT CHANGE UP (ref 0.5–2)
LEUKOCYTE ESTERASE UR-ACNC: NEGATIVE — SIGNIFICANT CHANGE UP
LYMPHOCYTES # BLD AUTO: 16.4 % — SIGNIFICANT CHANGE UP (ref 13–44)
LYMPHOCYTES # BLD AUTO: 3.64 K/UL — HIGH (ref 1–3.3)
MAGNESIUM SERPL-MCNC: 1.9 MG/DL — SIGNIFICANT CHANGE UP (ref 1.6–2.6)
MCHC RBC-ENTMCNC: 28.2 PG — SIGNIFICANT CHANGE UP (ref 27–34)
MCHC RBC-ENTMCNC: 33.6 GM/DL — SIGNIFICANT CHANGE UP (ref 32–36)
MCV RBC AUTO: 84 FL — SIGNIFICANT CHANGE UP (ref 80–100)
METHADONE UR-MCNC: NEGATIVE — SIGNIFICANT CHANGE UP
MONOCYTES # BLD AUTO: 1.24 K/UL — HIGH (ref 0–0.9)
MONOCYTES NFR BLD AUTO: 5.6 % — SIGNIFICANT CHANGE UP (ref 2–14)
MRSA PCR RESULT.: SIGNIFICANT CHANGE UP
NEUTROPHILS # BLD AUTO: 16.83 K/UL — HIGH (ref 1.8–7.4)
NEUTROPHILS # CSF: SIGNIFICANT CHANGE UP % (ref 0–6)
NEUTROPHILS NFR BLD AUTO: 75.6 % — SIGNIFICANT CHANGE UP (ref 43–77)
NIGHT BLUE STAIN TISS: SIGNIFICANT CHANGE UP
NITRITE UR-MCNC: NEGATIVE — SIGNIFICANT CHANGE UP
NRBC NFR CSF: 0 /UL — SIGNIFICANT CHANGE UP (ref 0–5)
NT-PROBNP SERPL-SCNC: 83 PG/ML — SIGNIFICANT CHANGE UP (ref 0–300)
OPIATES UR-MCNC: NEGATIVE — SIGNIFICANT CHANGE UP
OSMOLALITY SERPL: 286 MOSMOL/KG — SIGNIFICANT CHANGE UP (ref 280–301)
OSMOLALITY UR: 545 MOSM/KG — SIGNIFICANT CHANGE UP (ref 300–1000)
PCO2 BLDV: 45 MMHG — HIGH (ref 39–42)
PCP SPEC-MCNC: SIGNIFICANT CHANGE UP
PCP UR-MCNC: NEGATIVE — SIGNIFICANT CHANGE UP
PH BLDV: 7.37 — SIGNIFICANT CHANGE UP (ref 7.32–7.43)
PH UR: 6 — SIGNIFICANT CHANGE UP (ref 5–8)
PHOSPHATE SERPL-MCNC: 3.8 MG/DL — SIGNIFICANT CHANGE UP (ref 2.4–4.7)
PLATELET # BLD AUTO: 432 K/UL — HIGH (ref 150–400)
PO2 BLDV: 130 MMHG — HIGH (ref 25–45)
POTASSIUM BLDV-SCNC: 3.1 MMOL/L — LOW (ref 3.5–5.1)
POTASSIUM SERPL-MCNC: 3.3 MMOL/L — LOW (ref 3.5–5.3)
POTASSIUM SERPL-SCNC: 3.3 MMOL/L — LOW (ref 3.5–5.3)
PROT CSF-MCNC: 49 MG/DL — HIGH (ref 15–45)
PROT SERPL-MCNC: 7.9 G/DL — SIGNIFICANT CHANGE UP (ref 6.6–8.7)
PROT UR-MCNC: NEGATIVE MG/DL — SIGNIFICANT CHANGE UP
RBC # BLD: 5.32 M/UL — HIGH (ref 3.8–5.2)
RBC # CSF: 7 /CMM — HIGH (ref 0–1)
RBC # FLD: 13.2 % — SIGNIFICANT CHANGE UP (ref 10.3–14.5)
RSV RNA NPH QL NAA+NON-PROBE: SIGNIFICANT CHANGE UP
S AUREUS DNA NOSE QL NAA+PROBE: SIGNIFICANT CHANGE UP
SALICYLATES SERPL-MCNC: <0.6 MG/DL — LOW (ref 10–20)
SAO2 % BLDV: 98.7 % — SIGNIFICANT CHANGE UP
SARS-COV-2 RNA SPEC QL NAA+PROBE: SIGNIFICANT CHANGE UP
SODIUM SERPL-SCNC: 137 MMOL/L — SIGNIFICANT CHANGE UP (ref 135–145)
SP GR SPEC: 1.01 — SIGNIFICANT CHANGE UP (ref 1–1.03)
SPECIMEN SOURCE: SIGNIFICANT CHANGE UP
SPECIMEN SOURCE: SIGNIFICANT CHANGE UP
THC UR QL: NEGATIVE — SIGNIFICANT CHANGE UP
TROPONIN T, HIGH SENSITIVITY RESULT: 19 NG/L — SIGNIFICANT CHANGE UP (ref 0–51)
TSH SERPL-MCNC: 1.91 UIU/ML — SIGNIFICANT CHANGE UP (ref 0.27–4.2)
TUBE TYPE: SIGNIFICANT CHANGE UP
UROBILINOGEN FLD QL: 1 MG/DL — SIGNIFICANT CHANGE UP (ref 0.2–1)
VIT B12 SERPL-MCNC: 557 PG/ML — SIGNIFICANT CHANGE UP (ref 232–1245)
WBC # BLD: 22.24 K/UL — HIGH (ref 3.8–10.5)
WBC # FLD AUTO: 22.24 K/UL — HIGH (ref 3.8–10.5)

## 2024-06-13 PROCEDURE — 99291 CRITICAL CARE FIRST HOUR: CPT | Mod: FT,25

## 2024-06-13 PROCEDURE — 99233 SBSQ HOSP IP/OBS HIGH 50: CPT | Mod: 25

## 2024-06-13 PROCEDURE — 99292 CRITICAL CARE ADDL 30 MIN: CPT | Mod: 25

## 2024-06-13 PROCEDURE — 31500 INSERT EMERGENCY AIRWAY: CPT

## 2024-06-13 PROCEDURE — 72125 CT NECK SPINE W/O DYE: CPT | Mod: 26,MC

## 2024-06-13 PROCEDURE — 93010 ELECTROCARDIOGRAM REPORT: CPT

## 2024-06-13 PROCEDURE — 70450 CT HEAD/BRAIN W/O DYE: CPT | Mod: 26,MC

## 2024-06-13 PROCEDURE — 95720 EEG PHY/QHP EA INCR W/VEEG: CPT

## 2024-06-13 PROCEDURE — 74176 CT ABD & PELVIS W/O CONTRAST: CPT | Mod: 26,MC

## 2024-06-13 PROCEDURE — 71045 X-RAY EXAM CHEST 1 VIEW: CPT | Mod: 26

## 2024-06-13 PROCEDURE — 71250 CT THORAX DX C-: CPT | Mod: 26,MC

## 2024-06-13 PROCEDURE — 62270 DX LMBR SPI PNXR: CPT | Mod: GC

## 2024-06-13 RX ORDER — LEVETIRACETAM 100 MG/ML
1000 INJECTION INTRAVENOUS EVERY 12 HOURS
Refills: 0 | Status: DISCONTINUED | OUTPATIENT
Start: 2024-06-13 | End: 2024-06-14

## 2024-06-13 RX ORDER — DIAZEPAM 10 MG/1
5 TABLET ORAL EVERY 6 HOURS
Refills: 0 | Status: DISCONTINUED | OUTPATIENT
Start: 2024-06-13 | End: 2024-06-19

## 2024-06-13 RX ORDER — AMPICILLIN TRIHYDRATE 250 MG
2 CAPSULE ORAL ONCE
Refills: 0 | Status: COMPLETED | OUTPATIENT
Start: 2024-06-13 | End: 2024-06-13

## 2024-06-13 RX ORDER — PANTOPRAZOLE SODIUM 40 MG/10ML
40 INJECTION, POWDER, FOR SOLUTION INTRAVENOUS DAILY
Refills: 0 | Status: DISCONTINUED | OUTPATIENT
Start: 2024-06-13 | End: 2024-06-13

## 2024-06-13 RX ORDER — PROPOFOL 10 MG/ML
30 INJECTION, EMULSION INTRAVENOUS
Qty: 1000 | Refills: 0 | Status: DISCONTINUED | OUTPATIENT
Start: 2024-06-13 | End: 2024-06-13

## 2024-06-13 RX ORDER — CEFTRIAXONE SODIUM 500 MG
2000 VIAL (EA) INJECTION EVERY 12 HOURS
Refills: 0 | Status: DISCONTINUED | OUTPATIENT
Start: 2024-06-13 | End: 2024-06-14

## 2024-06-13 RX ORDER — LEVETIRACETAM 100 MG/ML
3000 INJECTION INTRAVENOUS ONCE
Refills: 0 | Status: COMPLETED | OUTPATIENT
Start: 2024-06-13 | End: 2024-06-13

## 2024-06-13 RX ORDER — POTASSIUM CHLORIDE 600 MG/1
10 TABLET, FILM COATED, EXTENDED RELEASE ORAL
Refills: 0 | Status: COMPLETED | OUTPATIENT
Start: 2024-06-13 | End: 2024-06-13

## 2024-06-13 RX ORDER — THIAMINE HCL 100 MG
500 TABLET ORAL EVERY 8 HOURS
Refills: 0 | Status: DISCONTINUED | OUTPATIENT
Start: 2024-06-13 | End: 2024-06-15

## 2024-06-13 RX ORDER — IPRATROPIUM BROMIDE AND ALBUTEROL SULFATE .5; 3 MG/3ML; MG/3ML
9 SOLUTION RESPIRATORY (INHALATION) ONCE
Refills: 0 | Status: COMPLETED | OUTPATIENT
Start: 2024-06-13 | End: 2024-06-13

## 2024-06-13 RX ORDER — SERTRALINE HYDROCHLORIDE 100 MG/1
50 TABLET, FILM COATED ORAL DAILY
Refills: 0 | Status: DISCONTINUED | OUTPATIENT
Start: 2024-06-13 | End: 2024-06-14

## 2024-06-13 RX ORDER — ACYCLOVIR SODIUM 50 MG/ML
700 VIAL (ML) INTRAVENOUS ONCE
Refills: 0 | Status: COMPLETED | OUTPATIENT
Start: 2024-06-13 | End: 2024-06-13

## 2024-06-13 RX ORDER — AMPICILLIN TRIHYDRATE 250 MG
CAPSULE ORAL
Refills: 0 | Status: DISCONTINUED | OUTPATIENT
Start: 2024-06-13 | End: 2024-06-14

## 2024-06-13 RX ORDER — OLANZAPINE 2.5 MG/1
10 TABLET, FILM COATED ORAL ONCE
Refills: 0 | Status: COMPLETED | OUTPATIENT
Start: 2024-06-13 | End: 2024-06-13

## 2024-06-13 RX ORDER — DEXAMETHASONE 1 MG/1
6 TABLET ORAL EVERY 6 HOURS
Refills: 0 | Status: DISCONTINUED | OUTPATIENT
Start: 2024-06-13 | End: 2024-06-14

## 2024-06-13 RX ORDER — ROCURONIUM BROMIDE 100 MG/10ML
100 INJECTION INTRAVENOUS ONCE
Refills: 0 | Status: COMPLETED | OUTPATIENT
Start: 2024-06-13 | End: 2024-06-13

## 2024-06-13 RX ORDER — DEXTROSE MONOHYDRATE AND SODIUM CHLORIDE 5; .3 G/100ML; G/100ML
2000 INJECTION, SOLUTION INTRAVENOUS ONCE
Refills: 0 | Status: COMPLETED | OUTPATIENT
Start: 2024-06-13 | End: 2024-06-13

## 2024-06-13 RX ORDER — METHYLPREDNISOLONE ACETATE 20 MG/ML
125 VIAL (ML) INJECTION ONCE
Refills: 0 | Status: COMPLETED | OUTPATIENT
Start: 2024-06-13 | End: 2024-06-13

## 2024-06-13 RX ORDER — NALOXONE HYDROCHLORIDE 1 MG/ML
2 INJECTION PARENTERAL ONCE
Refills: 0 | Status: COMPLETED | OUTPATIENT
Start: 2024-06-13 | End: 2024-06-13

## 2024-06-13 RX ORDER — ACYCLOVIR SODIUM 50 MG/ML
700 VIAL (ML) INTRAVENOUS EVERY 8 HOURS
Refills: 0 | Status: DISCONTINUED | OUTPATIENT
Start: 2024-06-13 | End: 2024-06-14

## 2024-06-13 RX ORDER — ROCURONIUM BROMIDE 100 MG/10ML
50 INJECTION INTRAVENOUS ONCE
Refills: 0 | Status: COMPLETED | OUTPATIENT
Start: 2024-06-13 | End: 2024-06-13

## 2024-06-13 RX ORDER — OLANZAPINE 15 MG/1
1 TABLET, FILM COATED ORAL
Refills: 0 | DISCHARGE

## 2024-06-13 RX ORDER — AMPICILLIN TRIHYDRATE 250 MG
2 CAPSULE ORAL EVERY 4 HOURS
Refills: 0 | Status: DISCONTINUED | OUTPATIENT
Start: 2024-06-13 | End: 2024-06-14

## 2024-06-13 RX ORDER — ACYCLOVIR SODIUM 50 MG/ML
VIAL (ML) INTRAVENOUS
Refills: 0 | Status: DISCONTINUED | OUTPATIENT
Start: 2024-06-13 | End: 2024-06-14

## 2024-06-13 RX ORDER — LAMOTRIGINE 100 MG/1
25 TABLET ORAL DAILY
Refills: 0 | Status: DISCONTINUED | OUTPATIENT
Start: 2024-06-13 | End: 2024-06-14

## 2024-06-13 RX ORDER — CEFTRIAXONE SODIUM 500 MG
2000 VIAL (EA) INJECTION EVERY 12 HOURS
Refills: 0 | Status: DISCONTINUED | OUTPATIENT
Start: 2024-06-13 | End: 2024-06-13

## 2024-06-13 RX ORDER — ETOMIDATE 2 MG/ML
20 INJECTION, SOLUTION INTRAVENOUS ONCE
Refills: 0 | Status: COMPLETED | OUTPATIENT
Start: 2024-06-13 | End: 2024-06-13

## 2024-06-13 RX ORDER — ENOXAPARIN SODIUM 100 MG/ML
40 INJECTION SUBCUTANEOUS EVERY 12 HOURS
Refills: 0 | Status: DISCONTINUED | OUTPATIENT
Start: 2024-06-13 | End: 2024-06-14

## 2024-06-13 RX ORDER — VANCOMYCIN HYDROCHLORIDE 50 MG/ML
1500 KIT ORAL EVERY 12 HOURS
Refills: 0 | Status: DISCONTINUED | OUTPATIENT
Start: 2024-06-13 | End: 2024-06-14

## 2024-06-13 RX ORDER — OLANZAPINE 2.5 MG/1
20 TABLET, FILM COATED ORAL DAILY
Refills: 0 | Status: DISCONTINUED | OUTPATIENT
Start: 2024-06-13 | End: 2024-06-14

## 2024-06-13 RX ORDER — LORAZEPAM 0.5 MG
2 TABLET ORAL ONCE
Refills: 0 | Status: DISCONTINUED | OUTPATIENT
Start: 2024-06-13 | End: 2024-06-13

## 2024-06-13 RX ADMIN — Medication 200 GRAM(S): at 08:35

## 2024-06-13 RX ADMIN — ENOXAPARIN SODIUM 40 MILLIGRAM(S): 100 INJECTION SUBCUTANEOUS at 17:04

## 2024-06-13 RX ADMIN — DIAZEPAM 5 MILLIGRAM(S): 10 TABLET ORAL at 23:11

## 2024-06-13 RX ADMIN — DEXTROSE MONOHYDRATE AND SODIUM CHLORIDE 1000 MILLILITER(S): 5; .3 INJECTION, SOLUTION INTRAVENOUS at 06:45

## 2024-06-13 RX ADMIN — Medication 200 GRAM(S): at 20:37

## 2024-06-13 RX ADMIN — DEXAMETHASONE 6 MILLIGRAM(S): 1 TABLET ORAL at 12:23

## 2024-06-13 RX ADMIN — IPRATROPIUM BROMIDE AND ALBUTEROL SULFATE 3 MILLILITER(S): .5; 3 SOLUTION RESPIRATORY (INHALATION) at 04:28

## 2024-06-13 RX ADMIN — Medication 2000 MILLIGRAM(S): at 17:03

## 2024-06-13 RX ADMIN — Medication 2 MILLIGRAM(S): at 03:15

## 2024-06-13 RX ADMIN — Medication 200 GRAM(S): at 16:36

## 2024-06-13 RX ADMIN — ETOMIDATE 20 MILLIGRAM(S): 2 INJECTION, SOLUTION INTRAVENOUS at 03:23

## 2024-06-13 RX ADMIN — LEVETIRACETAM 1000 MILLIGRAM(S): 100 INJECTION INTRAVENOUS at 20:38

## 2024-06-13 RX ADMIN — POTASSIUM CHLORIDE 100 MILLIEQUIVALENT(S): 600 TABLET, FILM COATED, EXTENDED RELEASE ORAL at 07:56

## 2024-06-13 RX ADMIN — Medication 125 MILLIGRAM(S): at 05:27

## 2024-06-13 RX ADMIN — ROCURONIUM BROMIDE 50 MILLIGRAM(S): 100 INJECTION INTRAVENOUS at 14:50

## 2024-06-13 RX ADMIN — Medication 200 GRAM(S): at 12:24

## 2024-06-13 RX ADMIN — Medication 264 MILLIGRAM(S): at 08:35

## 2024-06-13 RX ADMIN — ROCURONIUM BROMIDE 100 MILLIGRAM(S): 100 INJECTION INTRAVENOUS at 03:23

## 2024-06-13 RX ADMIN — VANCOMYCIN HYDROCHLORIDE 300 MILLIGRAM(S): KIT at 20:37

## 2024-06-13 RX ADMIN — Medication 2000 MILLIGRAM(S): at 07:56

## 2024-06-13 RX ADMIN — Medication 105 MILLIGRAM(S): at 23:13

## 2024-06-13 RX ADMIN — VANCOMYCIN HYDROCHLORIDE 300 MILLIGRAM(S): KIT at 08:35

## 2024-06-13 RX ADMIN — Medication 15 MILLILITER(S): at 07:40

## 2024-06-13 RX ADMIN — OLANZAPINE 10 MILLIGRAM(S): 2.5 TABLET, FILM COATED ORAL at 22:29

## 2024-06-13 RX ADMIN — PROPOFOL 14.4 MICROGRAM(S)/KG/MIN: 10 INJECTION, EMULSION INTRAVENOUS at 12:49

## 2024-06-13 RX ADMIN — Medication 15 MILLILITER(S): at 17:04

## 2024-06-13 RX ADMIN — ENOXAPARIN SODIUM 40 MILLIGRAM(S): 100 INJECTION SUBCUTANEOUS at 07:41

## 2024-06-13 RX ADMIN — DEXAMETHASONE 6 MILLIGRAM(S): 1 TABLET ORAL at 07:56

## 2024-06-13 RX ADMIN — POTASSIUM CHLORIDE 100 MILLIEQUIVALENT(S): 600 TABLET, FILM COATED, EXTENDED RELEASE ORAL at 09:36

## 2024-06-13 RX ADMIN — POTASSIUM CHLORIDE 100 MILLIEQUIVALENT(S): 600 TABLET, FILM COATED, EXTENDED RELEASE ORAL at 06:45

## 2024-06-13 RX ADMIN — Medication 105 MILLIGRAM(S): at 14:50

## 2024-06-13 RX ADMIN — DEXAMETHASONE 6 MILLIGRAM(S): 1 TABLET ORAL at 17:04

## 2024-06-13 RX ADMIN — Medication 264 MILLIGRAM(S): at 16:36

## 2024-06-13 RX ADMIN — PANTOPRAZOLE SODIUM 40 MILLIGRAM(S): 40 INJECTION, POWDER, FOR SOLUTION INTRAVENOUS at 12:23

## 2024-06-13 RX ADMIN — LEVETIRACETAM 720 MILLIGRAM(S): 100 INJECTION INTRAVENOUS at 09:36

## 2024-06-13 RX ADMIN — NALOXONE HYDROCHLORIDE 2 MILLIGRAM(S): 1 INJECTION PARENTERAL at 03:30

## 2024-06-14 LAB
ALBUMIN CSF-MCNC: 26.8 MG/DL — HIGH (ref 14–25)
ALBUMIN SERPL ELPH-MCNC: 3865 MG/DL — SIGNIFICANT CHANGE UP (ref 3500–5200)
ANION GAP SERPL CALC-SCNC: 13 MMOL/L — SIGNIFICANT CHANGE UP (ref 5–17)
BUN SERPL-MCNC: 8.7 MG/DL — SIGNIFICANT CHANGE UP (ref 8–20)
CALCIUM SERPL-MCNC: 9 MG/DL — SIGNIFICANT CHANGE UP (ref 8.4–10.5)
CHLORIDE SERPL-SCNC: 105 MMOL/L — SIGNIFICANT CHANGE UP (ref 96–108)
CO2 SERPL-SCNC: 24 MMOL/L — SIGNIFICANT CHANGE UP (ref 22–29)
CREAT SERPL-MCNC: 0.69 MG/DL — SIGNIFICANT CHANGE UP (ref 0.5–1.3)
CSF PCR RESULT: SIGNIFICANT CHANGE UP
EGFR: 97 ML/MIN/1.73M2 — SIGNIFICANT CHANGE UP
GLUCOSE SERPL-MCNC: 115 MG/DL — HIGH (ref 70–99)
HCT VFR BLD CALC: 39.9 % — SIGNIFICANT CHANGE UP (ref 34.5–45)
HGB BLD-MCNC: 13.6 G/DL — SIGNIFICANT CHANGE UP (ref 11.5–15.5)
IGG CSF-MCNC: 4.1 MG/DL — HIGH
IGG FLD-MCNC: 904 MG/DL — SIGNIFICANT CHANGE UP (ref 610–1660)
IGG SYNTH RATE SER+CSF CALC-MRATE: 3.2 MG/DAY — SIGNIFICANT CHANGE UP
IGG/ALB CLEAR SER+CSF-RTO: 0.7 — SIGNIFICANT CHANGE UP
IGG/ALB CSF: 0.15 RATIO — SIGNIFICANT CHANGE UP
IGG/ALB SER: 0.23 RATIO — SIGNIFICANT CHANGE UP
LDH CSF L TO P-CCNC: 11 U/L — SIGNIFICANT CHANGE UP
LDH FLD-CCNC: 11 U/L — SIGNIFICANT CHANGE UP
MAGNESIUM SERPL-MCNC: 2.1 MG/DL — SIGNIFICANT CHANGE UP (ref 1.6–2.6)
MCHC RBC-ENTMCNC: 28.5 PG — SIGNIFICANT CHANGE UP (ref 27–34)
MCHC RBC-ENTMCNC: 34.1 GM/DL — SIGNIFICANT CHANGE UP (ref 32–36)
MCV RBC AUTO: 83.6 FL — SIGNIFICANT CHANGE UP (ref 80–100)
PHOSPHATE SERPL-MCNC: 3.2 MG/DL — SIGNIFICANT CHANGE UP (ref 2.4–4.7)
PLATELET # BLD AUTO: 290 K/UL — SIGNIFICANT CHANGE UP (ref 150–400)
POTASSIUM SERPL-MCNC: 3.7 MMOL/L — SIGNIFICANT CHANGE UP (ref 3.5–5.3)
POTASSIUM SERPL-SCNC: 3.7 MMOL/L — SIGNIFICANT CHANGE UP (ref 3.5–5.3)
PROCALCITONIN SERPL-MCNC: 0.12 NG/ML — HIGH (ref 0.02–0.1)
RBC # BLD: 4.77 M/UL — SIGNIFICANT CHANGE UP (ref 3.8–5.2)
RBC # FLD: 13.2 % — SIGNIFICANT CHANGE UP (ref 10.3–14.5)
SODIUM SERPL-SCNC: 142 MMOL/L — SIGNIFICANT CHANGE UP (ref 135–145)
WBC # BLD: 21.34 K/UL — HIGH (ref 3.8–10.5)
WBC # FLD AUTO: 21.34 K/UL — HIGH (ref 3.8–10.5)

## 2024-06-14 PROCEDURE — 99223 1ST HOSP IP/OBS HIGH 75: CPT

## 2024-06-14 PROCEDURE — 99233 SBSQ HOSP IP/OBS HIGH 50: CPT | Mod: GC

## 2024-06-14 PROCEDURE — 99233 SBSQ HOSP IP/OBS HIGH 50: CPT

## 2024-06-14 RX ORDER — ENOXAPARIN SODIUM 100 MG/ML
40 INJECTION SUBCUTANEOUS
Refills: 0 | Status: DISCONTINUED | OUTPATIENT
Start: 2024-06-15 | End: 2024-06-19

## 2024-06-14 RX ORDER — POTASSIUM CHLORIDE 600 MG/1
40 TABLET, FILM COATED, EXTENDED RELEASE ORAL ONCE
Refills: 0 | Status: COMPLETED | OUTPATIENT
Start: 2024-06-14 | End: 2024-06-14

## 2024-06-14 RX ORDER — BUDESONIDE/FORMOTEROL FUMARATE 160-4.5MCG
2 HFA AEROSOL WITH ADAPTER (GRAM) INHALATION
Refills: 0 | Status: DISCONTINUED | OUTPATIENT
Start: 2024-06-14 | End: 2024-06-19

## 2024-06-14 RX ORDER — OLANZAPINE 2.5 MG/1
20 TABLET, FILM COATED ORAL AT BEDTIME
Refills: 0 | Status: DISCONTINUED | OUTPATIENT
Start: 2024-06-14 | End: 2024-06-19

## 2024-06-14 RX ORDER — LAMOTRIGINE 100 MG/1
25 TABLET ORAL AT BEDTIME
Refills: 0 | Status: DISCONTINUED | OUTPATIENT
Start: 2024-06-14 | End: 2024-06-19

## 2024-06-14 RX ORDER — TIOTROPIUM BROMIDE 18 UG/1
2 CAPSULE ORAL; RESPIRATORY (INHALATION) DAILY
Refills: 0 | Status: DISCONTINUED | OUTPATIENT
Start: 2024-06-14 | End: 2024-06-19

## 2024-06-14 RX ORDER — POTASSIUM CHLORIDE 600 MG/1
10 TABLET, FILM COATED, EXTENDED RELEASE ORAL
Refills: 0 | Status: DISCONTINUED | OUTPATIENT
Start: 2024-06-14 | End: 2024-06-14

## 2024-06-14 RX ADMIN — Medication 105 MILLIGRAM(S): at 14:06

## 2024-06-14 RX ADMIN — LAMOTRIGINE 25 MILLIGRAM(S): 100 TABLET ORAL at 12:11

## 2024-06-14 RX ADMIN — Medication 105 MILLIGRAM(S): at 05:31

## 2024-06-14 RX ADMIN — POTASSIUM CHLORIDE 40 MILLIEQUIVALENT(S): 600 TABLET, FILM COATED, EXTENDED RELEASE ORAL at 06:37

## 2024-06-14 RX ADMIN — Medication 2 PUFF(S): at 20:49

## 2024-06-14 RX ADMIN — LAMOTRIGINE 25 MILLIGRAM(S): 100 TABLET ORAL at 22:30

## 2024-06-14 RX ADMIN — Medication 105 MILLIGRAM(S): at 22:30

## 2024-06-14 RX ADMIN — Medication 1 APPLICATION(S): at 12:13

## 2024-06-14 RX ADMIN — OLANZAPINE 20 MILLIGRAM(S): 2.5 TABLET, FILM COATED ORAL at 22:30

## 2024-06-14 RX ADMIN — ENOXAPARIN SODIUM 40 MILLIGRAM(S): 100 INJECTION SUBCUTANEOUS at 05:31

## 2024-06-14 RX ADMIN — LEVETIRACETAM 1000 MILLIGRAM(S): 100 INJECTION INTRAVENOUS at 07:33

## 2024-06-14 RX ADMIN — Medication 2000 MILLIGRAM(S): at 05:31

## 2024-06-14 RX ADMIN — OLANZAPINE 20 MILLIGRAM(S): 2.5 TABLET, FILM COATED ORAL at 12:10

## 2024-06-14 RX ADMIN — SERTRALINE HYDROCHLORIDE 50 MILLIGRAM(S): 100 TABLET, FILM COATED ORAL at 12:10

## 2024-06-15 LAB
ANION GAP SERPL CALC-SCNC: 11 MMOL/L — SIGNIFICANT CHANGE UP (ref 5–17)
BASOPHILS # BLD AUTO: 0.06 K/UL — SIGNIFICANT CHANGE UP (ref 0–0.2)
BASOPHILS NFR BLD AUTO: 0.4 % — SIGNIFICANT CHANGE UP (ref 0–2)
BUN SERPL-MCNC: 24.5 MG/DL — HIGH (ref 8–20)
CALCIUM SERPL-MCNC: 8.5 MG/DL — SIGNIFICANT CHANGE UP (ref 8.4–10.5)
CHLORIDE SERPL-SCNC: 105 MMOL/L — SIGNIFICANT CHANGE UP (ref 96–108)
CO2 SERPL-SCNC: 26 MMOL/L — SIGNIFICANT CHANGE UP (ref 22–29)
CREAT SERPL-MCNC: 1 MG/DL — SIGNIFICANT CHANGE UP (ref 0.5–1.3)
EGFR: 63 ML/MIN/1.73M2 — SIGNIFICANT CHANGE UP
EOSINOPHIL # BLD AUTO: 0.02 K/UL — SIGNIFICANT CHANGE UP (ref 0–0.5)
EOSINOPHIL NFR BLD AUTO: 0.1 % — SIGNIFICANT CHANGE UP (ref 0–6)
GLUCOSE SERPL-MCNC: 82 MG/DL — SIGNIFICANT CHANGE UP (ref 70–99)
HCT VFR BLD CALC: 38.2 % — SIGNIFICANT CHANGE UP (ref 34.5–45)
HGB BLD-MCNC: 12.6 G/DL — SIGNIFICANT CHANGE UP (ref 11.5–15.5)
IMM GRANULOCYTES NFR BLD AUTO: 0.6 % — SIGNIFICANT CHANGE UP (ref 0–0.9)
JCPYV DNA # CSF NAA+PROBE: SIGNIFICANT CHANGE UP COPIES/ML
LYMPHOCYTES # BLD AUTO: 26.8 % — SIGNIFICANT CHANGE UP (ref 13–44)
LYMPHOCYTES # BLD AUTO: 3.79 K/UL — HIGH (ref 1–3.3)
MCHC RBC-ENTMCNC: 28.8 PG — SIGNIFICANT CHANGE UP (ref 27–34)
MCHC RBC-ENTMCNC: 33 GM/DL — SIGNIFICANT CHANGE UP (ref 32–36)
MCV RBC AUTO: 87.2 FL — SIGNIFICANT CHANGE UP (ref 80–100)
MONOCYTES # BLD AUTO: 0.98 K/UL — HIGH (ref 0–0.9)
MONOCYTES NFR BLD AUTO: 6.9 % — SIGNIFICANT CHANGE UP (ref 2–14)
NEUTROPHILS # BLD AUTO: 9.23 K/UL — HIGH (ref 1.8–7.4)
NEUTROPHILS NFR BLD AUTO: 65.2 % — SIGNIFICANT CHANGE UP (ref 43–77)
PLATELET # BLD AUTO: 237 K/UL — SIGNIFICANT CHANGE UP (ref 150–400)
POTASSIUM SERPL-MCNC: 4 MMOL/L — SIGNIFICANT CHANGE UP (ref 3.5–5.3)
POTASSIUM SERPL-SCNC: 4 MMOL/L — SIGNIFICANT CHANGE UP (ref 3.5–5.3)
RBC # BLD: 4.38 M/UL — SIGNIFICANT CHANGE UP (ref 3.8–5.2)
RBC # FLD: 13.8 % — SIGNIFICANT CHANGE UP (ref 10.3–14.5)
SODIUM SERPL-SCNC: 142 MMOL/L — SIGNIFICANT CHANGE UP (ref 135–145)
WBC # BLD: 14.16 K/UL — HIGH (ref 3.8–10.5)
WBC # FLD AUTO: 14.16 K/UL — HIGH (ref 3.8–10.5)

## 2024-06-15 PROCEDURE — 99233 SBSQ HOSP IP/OBS HIGH 50: CPT

## 2024-06-15 PROCEDURE — 99232 SBSQ HOSP IP/OBS MODERATE 35: CPT

## 2024-06-15 PROCEDURE — 99233 SBSQ HOSP IP/OBS HIGH 50: CPT | Mod: GC

## 2024-06-15 RX ADMIN — TIOTROPIUM BROMIDE 2 PUFF(S): 18 CAPSULE ORAL; RESPIRATORY (INHALATION) at 08:47

## 2024-06-15 RX ADMIN — Medication 105 MILLIGRAM(S): at 17:55

## 2024-06-15 RX ADMIN — Medication 1 APPLICATION(S): at 10:19

## 2024-06-15 RX ADMIN — Medication 2 PUFF(S): at 08:50

## 2024-06-15 RX ADMIN — Medication 105 MILLIGRAM(S): at 11:09

## 2024-06-15 RX ADMIN — OLANZAPINE 20 MILLIGRAM(S): 2.5 TABLET, FILM COATED ORAL at 21:22

## 2024-06-15 RX ADMIN — ENOXAPARIN SODIUM 40 MILLIGRAM(S): 100 INJECTION SUBCUTANEOUS at 11:09

## 2024-06-15 RX ADMIN — LAMOTRIGINE 25 MILLIGRAM(S): 100 TABLET ORAL at 21:22

## 2024-06-16 LAB
-  AMPICILLIN: SIGNIFICANT CHANGE UP
-  CIPROFLOXACIN: SIGNIFICANT CHANGE UP
-  LEVOFLOXACIN: SIGNIFICANT CHANGE UP
-  NITROFURANTOIN: SIGNIFICANT CHANGE UP
-  TETRACYCLINE: SIGNIFICANT CHANGE UP
-  VANCOMYCIN: SIGNIFICANT CHANGE UP
CULTURE RESULTS: ABNORMAL
METHOD TYPE: SIGNIFICANT CHANGE UP
ORGANISM # SPEC MICROSCOPIC CNT: ABNORMAL
ORGANISM # SPEC MICROSCOPIC CNT: SIGNIFICANT CHANGE UP
SPECIMEN SOURCE: SIGNIFICANT CHANGE UP

## 2024-06-16 PROCEDURE — 99232 SBSQ HOSP IP/OBS MODERATE 35: CPT

## 2024-06-16 RX ORDER — NICOTINE POLACRILEX 2 MG/1
1 LOZENGE ORAL DAILY
Refills: 0 | Status: DISCONTINUED | OUTPATIENT
Start: 2024-06-16 | End: 2024-06-19

## 2024-06-16 RX ORDER — ONDANSETRON HYDROCHLORIDE 2 MG/ML
4 INJECTION INTRAMUSCULAR; INTRAVENOUS EVERY 8 HOURS
Refills: 0 | Status: DISCONTINUED | OUTPATIENT
Start: 2024-06-16 | End: 2024-06-19

## 2024-06-16 RX ADMIN — OLANZAPINE 20 MILLIGRAM(S): 2.5 TABLET, FILM COATED ORAL at 21:54

## 2024-06-16 RX ADMIN — NICOTINE POLACRILEX 1 PATCH: 2 LOZENGE ORAL at 17:50

## 2024-06-16 RX ADMIN — ENOXAPARIN SODIUM 40 MILLIGRAM(S): 100 INJECTION SUBCUTANEOUS at 10:21

## 2024-06-16 RX ADMIN — NICOTINE POLACRILEX 1 PATCH: 2 LOZENGE ORAL at 21:56

## 2024-06-16 RX ADMIN — Medication 1 APPLICATION(S): at 08:53

## 2024-06-16 RX ADMIN — LAMOTRIGINE 25 MILLIGRAM(S): 100 TABLET ORAL at 21:54

## 2024-06-17 ENCOUNTER — TRANSCRIPTION ENCOUNTER (OUTPATIENT)
Age: 64
End: 2024-06-17

## 2024-06-17 LAB
ANION GAP SERPL CALC-SCNC: 14 MMOL/L — SIGNIFICANT CHANGE UP (ref 5–17)
BASOPHILS # BLD AUTO: 0.07 K/UL — SIGNIFICANT CHANGE UP (ref 0–0.2)
BASOPHILS NFR BLD AUTO: 0.6 % — SIGNIFICANT CHANGE UP (ref 0–2)
BUN SERPL-MCNC: 16.6 MG/DL — SIGNIFICANT CHANGE UP (ref 8–20)
CALCIUM SERPL-MCNC: 8.8 MG/DL — SIGNIFICANT CHANGE UP (ref 8.4–10.5)
CHLORIDE SERPL-SCNC: 101 MMOL/L — SIGNIFICANT CHANGE UP (ref 96–108)
CO2 SERPL-SCNC: 24 MMOL/L — SIGNIFICANT CHANGE UP (ref 22–29)
CREAT SERPL-MCNC: 0.83 MG/DL — SIGNIFICANT CHANGE UP (ref 0.5–1.3)
EGFR: 79 ML/MIN/1.73M2 — SIGNIFICANT CHANGE UP
EOSINOPHIL # BLD AUTO: 0.19 K/UL — SIGNIFICANT CHANGE UP (ref 0–0.5)
EOSINOPHIL NFR BLD AUTO: 1.7 % — SIGNIFICANT CHANGE UP (ref 0–6)
GLUCOSE SERPL-MCNC: 101 MG/DL — HIGH (ref 70–99)
HCT VFR BLD CALC: 40.9 % — SIGNIFICANT CHANGE UP (ref 34.5–45)
HGB BLD-MCNC: 13.5 G/DL — SIGNIFICANT CHANGE UP (ref 11.5–15.5)
IMM GRANULOCYTES NFR BLD AUTO: 0.6 % — SIGNIFICANT CHANGE UP (ref 0–0.9)
INNER EAR 68KD AB FLD QL: <1.5 U/L — SIGNIFICANT CHANGE UP (ref 0–3.1)
LYMPHOCYTES # BLD AUTO: 27.5 % — SIGNIFICANT CHANGE UP (ref 13–44)
LYMPHOCYTES # BLD AUTO: 3 K/UL — SIGNIFICANT CHANGE UP (ref 1–3.3)
MAGNESIUM SERPL-MCNC: 2.1 MG/DL — SIGNIFICANT CHANGE UP (ref 1.6–2.6)
MCHC RBC-ENTMCNC: 28.4 PG — SIGNIFICANT CHANGE UP (ref 27–34)
MCHC RBC-ENTMCNC: 33 GM/DL — SIGNIFICANT CHANGE UP (ref 32–36)
MCV RBC AUTO: 85.9 FL — SIGNIFICANT CHANGE UP (ref 80–100)
MONOCYTES # BLD AUTO: 0.77 K/UL — SIGNIFICANT CHANGE UP (ref 0–0.9)
MONOCYTES NFR BLD AUTO: 7.1 % — SIGNIFICANT CHANGE UP (ref 2–14)
NEUTROPHILS # BLD AUTO: 6.82 K/UL — SIGNIFICANT CHANGE UP (ref 1.8–7.4)
NEUTROPHILS NFR BLD AUTO: 62.5 % — SIGNIFICANT CHANGE UP (ref 43–77)
PHOSPHATE SERPL-MCNC: 3.8 MG/DL — SIGNIFICANT CHANGE UP (ref 2.4–4.7)
PLATELET # BLD AUTO: 233 K/UL — SIGNIFICANT CHANGE UP (ref 150–400)
POTASSIUM SERPL-MCNC: 4 MMOL/L — SIGNIFICANT CHANGE UP (ref 3.5–5.3)
POTASSIUM SERPL-SCNC: 4 MMOL/L — SIGNIFICANT CHANGE UP (ref 3.5–5.3)
RBC # BLD: 4.76 M/UL — SIGNIFICANT CHANGE UP (ref 3.8–5.2)
RBC # FLD: 13.3 % — SIGNIFICANT CHANGE UP (ref 10.3–14.5)
SODIUM SERPL-SCNC: 139 MMOL/L — SIGNIFICANT CHANGE UP (ref 135–145)
VDRL CSF-TITR: SIGNIFICANT CHANGE UP
WBC # BLD: 10.92 K/UL — HIGH (ref 3.8–10.5)
WBC # FLD AUTO: 10.92 K/UL — HIGH (ref 3.8–10.5)
WNV IGG CSF IA-ACNC: NEGATIVE — SIGNIFICANT CHANGE UP
WNV IGM CSF IA-ACNC: NEGATIVE — SIGNIFICANT CHANGE UP

## 2024-06-17 PROCEDURE — 99232 SBSQ HOSP IP/OBS MODERATE 35: CPT

## 2024-06-17 PROCEDURE — 99232 SBSQ HOSP IP/OBS MODERATE 35: CPT | Mod: GC

## 2024-06-17 RX ORDER — SERTRALINE HYDROCHLORIDE 100 MG/1
1 TABLET, FILM COATED ORAL
Refills: 0 | DISCHARGE

## 2024-06-17 RX ORDER — NICOTINE POLACRILEX 2 MG/1
0 LOZENGE ORAL
Qty: 0 | Refills: 0 | DISCHARGE
Start: 2024-06-17

## 2024-06-17 RX ORDER — SENNOSIDES 8.6 MG
1 TABLET ORAL DAILY
Refills: 0 | Status: DISCONTINUED | OUTPATIENT
Start: 2024-06-17 | End: 2024-06-19

## 2024-06-17 RX ORDER — POLYETHYLENE GLYCOL 3350 1 G/G
17 POWDER ORAL DAILY
Refills: 0 | Status: DISCONTINUED | OUTPATIENT
Start: 2024-06-17 | End: 2024-06-19

## 2024-06-17 RX ADMIN — POLYETHYLENE GLYCOL 3350 17 GRAM(S): 1 POWDER ORAL at 10:01

## 2024-06-17 RX ADMIN — ENOXAPARIN SODIUM 40 MILLIGRAM(S): 100 INJECTION SUBCUTANEOUS at 10:00

## 2024-06-17 RX ADMIN — TIOTROPIUM BROMIDE 2 PUFF(S): 18 CAPSULE ORAL; RESPIRATORY (INHALATION) at 10:24

## 2024-06-17 RX ADMIN — LAMOTRIGINE 25 MILLIGRAM(S): 100 TABLET ORAL at 22:03

## 2024-06-17 RX ADMIN — OLANZAPINE 20 MILLIGRAM(S): 2.5 TABLET, FILM COATED ORAL at 22:03

## 2024-06-17 RX ADMIN — Medication 1 TABLET(S): at 10:01

## 2024-06-17 RX ADMIN — NICOTINE POLACRILEX 1 PATCH: 2 LOZENGE ORAL at 08:26

## 2024-06-17 RX ADMIN — NICOTINE POLACRILEX 1 PATCH: 2 LOZENGE ORAL at 20:00

## 2024-06-18 LAB
B BURGDOR DNA SPEC QL NAA+PROBE: NEGATIVE — SIGNIFICANT CHANGE UP
CULTURE RESULTS: SIGNIFICANT CHANGE UP
EBV PCR: SIGNIFICANT CHANGE UP IU/ML
LYME IGG LINE BLOT INTERP.: NEGATIVE — SIGNIFICANT CHANGE UP
LYME IGM LINE BLOT INTERP.: NEGATIVE — SIGNIFICANT CHANGE UP
P18 AB. IGG: SIGNIFICANT CHANGE UP
P23 AB. IGG: SIGNIFICANT CHANGE UP
P23 AB. IGM: SIGNIFICANT CHANGE UP
P28 AB. IGG: SIGNIFICANT CHANGE UP
P30 AB. IGG: SIGNIFICANT CHANGE UP
P39 AB. IGG: SIGNIFICANT CHANGE UP
P39 AB. IGM: SIGNIFICANT CHANGE UP
P41 AB. IGG: SIGNIFICANT CHANGE UP
P41 AB. IGM: SIGNIFICANT CHANGE UP
P45 AB. IGG: SIGNIFICANT CHANGE UP
P58 AB. IGG: SIGNIFICANT CHANGE UP
P66 AB. IGG: SIGNIFICANT CHANGE UP
P93 AB. IGG: SIGNIFICANT CHANGE UP
SARS-COV-2 RNA SPEC QL NAA+PROBE: SIGNIFICANT CHANGE UP
SPECIMEN SOURCE: SIGNIFICANT CHANGE UP

## 2024-06-18 PROCEDURE — 99232 SBSQ HOSP IP/OBS MODERATE 35: CPT | Mod: GC

## 2024-06-18 PROCEDURE — 99232 SBSQ HOSP IP/OBS MODERATE 35: CPT

## 2024-06-18 RX ORDER — NICOTINE POLACRILEX 2 MG/1
1 LOZENGE ORAL ONCE
Refills: 0 | Status: DISCONTINUED | OUTPATIENT
Start: 2024-06-18 | End: 2024-06-19

## 2024-06-18 RX ORDER — NICOTINE POLACRILEX 2 MG/1
14 LOZENGE ORAL ONCE
Refills: 0 | Status: DISCONTINUED | OUTPATIENT
Start: 2024-06-18 | End: 2024-06-18

## 2024-06-18 RX ADMIN — OLANZAPINE 20 MILLIGRAM(S): 2.5 TABLET, FILM COATED ORAL at 21:02

## 2024-06-18 RX ADMIN — NICOTINE POLACRILEX 1 PATCH: 2 LOZENGE ORAL at 08:15

## 2024-06-18 RX ADMIN — ENOXAPARIN SODIUM 40 MILLIGRAM(S): 100 INJECTION SUBCUTANEOUS at 11:02

## 2024-06-18 RX ADMIN — Medication 1 APPLICATION(S): at 11:02

## 2024-06-18 RX ADMIN — Medication 1 TABLET(S): at 11:05

## 2024-06-18 RX ADMIN — TIOTROPIUM BROMIDE 2 PUFF(S): 18 CAPSULE ORAL; RESPIRATORY (INHALATION) at 08:14

## 2024-06-18 RX ADMIN — NICOTINE POLACRILEX 1 PATCH: 2 LOZENGE ORAL at 16:47

## 2024-06-18 RX ADMIN — POLYETHYLENE GLYCOL 3350 17 GRAM(S): 1 POWDER ORAL at 11:03

## 2024-06-18 RX ADMIN — Medication 2 PUFF(S): at 08:14

## 2024-06-18 RX ADMIN — LAMOTRIGINE 25 MILLIGRAM(S): 100 TABLET ORAL at 21:02

## 2024-06-19 ENCOUNTER — INPATIENT (INPATIENT)
Facility: HOSPITAL | Age: 64
LOS: 12 days | Discharge: HOME | End: 2024-07-02
Attending: PSYCHIATRY & NEUROLOGY | Admitting: PSYCHIATRY & NEUROLOGY
Payer: MEDICARE

## 2024-06-19 ENCOUNTER — TRANSCRIPTION ENCOUNTER (OUTPATIENT)
Age: 64
End: 2024-06-19

## 2024-06-19 VITALS
OXYGEN SATURATION: 96 % | RESPIRATION RATE: 17 BRPM | HEART RATE: 96 BPM | DIASTOLIC BLOOD PRESSURE: 79 MMHG | TEMPERATURE: 99 F | SYSTOLIC BLOOD PRESSURE: 120 MMHG

## 2024-06-19 VITALS — TEMPERATURE: 98 F | HEIGHT: 67 IN | WEIGHT: 207.01 LBS

## 2024-06-19 DIAGNOSIS — F25.9 SCHIZOAFFECTIVE DISORDER, UNSPECIFIED: ICD-10-CM

## 2024-06-19 LAB — MBP CSF-MCNC: 1.7 NG/ML — SIGNIFICANT CHANGE UP (ref 0–4.7)

## 2024-06-19 PROCEDURE — 36415 COLL VENOUS BLD VENIPUNCTURE: CPT

## 2024-06-19 PROCEDURE — 81003 URINALYSIS AUTO W/O SCOPE: CPT

## 2024-06-19 PROCEDURE — 84145 PROCALCITONIN (PCT): CPT

## 2024-06-19 PROCEDURE — 80053 COMPREHEN METABOLIC PANEL: CPT

## 2024-06-19 PROCEDURE — 74176 CT ABD & PELVIS W/O CONTRAST: CPT | Mod: MC

## 2024-06-19 PROCEDURE — 82607 VITAMIN B-12: CPT

## 2024-06-19 PROCEDURE — 86403 PARTICLE AGGLUT ANTBDY SCRN: CPT

## 2024-06-19 PROCEDURE — 83935 ASSAY OF URINE OSMOLALITY: CPT

## 2024-06-19 PROCEDURE — 95714 VEEG EA 12-26 HR UNMNTR: CPT

## 2024-06-19 PROCEDURE — 71045 X-RAY EXAM CHEST 1 VIEW: CPT

## 2024-06-19 PROCEDURE — 86341 ISLET CELL ANTIBODY: CPT

## 2024-06-19 PROCEDURE — 87070 CULTURE OTHR SPECIMN AEROBIC: CPT

## 2024-06-19 PROCEDURE — 87086 URINE CULTURE/COLONY COUNT: CPT

## 2024-06-19 PROCEDURE — 86255 FLUORESCENT ANTIBODY SCREEN: CPT

## 2024-06-19 PROCEDURE — 83930 ASSAY OF BLOOD OSMOLALITY: CPT

## 2024-06-19 PROCEDURE — 85018 HEMOGLOBIN: CPT

## 2024-06-19 PROCEDURE — 87637 SARSCOV2&INF A&B&RSV AMP PRB: CPT

## 2024-06-19 PROCEDURE — 99291 CRITICAL CARE FIRST HOUR: CPT

## 2024-06-19 PROCEDURE — 82140 ASSAY OF AMMONIA: CPT

## 2024-06-19 PROCEDURE — 86362 MOG-IGG1 ANTB CBA EACH: CPT

## 2024-06-19 PROCEDURE — 83735 ASSAY OF MAGNESIUM: CPT

## 2024-06-19 PROCEDURE — 99239 HOSP IP/OBS DSCHRG MGMT >30: CPT

## 2024-06-19 PROCEDURE — 85027 COMPLETE CBC AUTOMATED: CPT

## 2024-06-19 PROCEDURE — 82330 ASSAY OF CALCIUM: CPT

## 2024-06-19 PROCEDURE — 82042 OTHER SOURCE ALBUMIN QUAN EA: CPT

## 2024-06-19 PROCEDURE — 93005 ELECTROCARDIOGRAM TRACING: CPT

## 2024-06-19 PROCEDURE — 87186 SC STD MICRODIL/AGAR DIL: CPT

## 2024-06-19 PROCEDURE — 82962 GLUCOSE BLOOD TEST: CPT

## 2024-06-19 PROCEDURE — 84100 ASSAY OF PHOSPHORUS: CPT

## 2024-06-19 PROCEDURE — 89051 BODY FLUID CELL COUNT: CPT

## 2024-06-19 PROCEDURE — 87205 SMEAR GRAM STAIN: CPT

## 2024-06-19 PROCEDURE — 82945 GLUCOSE OTHER FLUID: CPT

## 2024-06-19 PROCEDURE — 95813 EEG EXTND MNTR 61-119 MIN: CPT

## 2024-06-19 PROCEDURE — 83605 ASSAY OF LACTIC ACID: CPT

## 2024-06-19 PROCEDURE — 87635 SARS-COV-2 COVID-19 AMP PRB: CPT

## 2024-06-19 PROCEDURE — 94640 AIRWAY INHALATION TREATMENT: CPT

## 2024-06-19 PROCEDURE — 87102 FUNGUS ISOLATION CULTURE: CPT

## 2024-06-19 PROCEDURE — 83873 ASSAY OF CSF PROTEIN: CPT

## 2024-06-19 PROCEDURE — 86617 LYME DISEASE ANTIBODY: CPT

## 2024-06-19 PROCEDURE — 83615 LACTATE (LD) (LDH) ENZYME: CPT

## 2024-06-19 PROCEDURE — 36600 WITHDRAWAL OF ARTERIAL BLOOD: CPT

## 2024-06-19 PROCEDURE — 87077 CULTURE AEROBIC IDENTIFY: CPT

## 2024-06-19 PROCEDURE — 82435 ASSAY OF BLOOD CHLORIDE: CPT

## 2024-06-19 PROCEDURE — 84157 ASSAY OF PROTEIN OTHER: CPT

## 2024-06-19 PROCEDURE — 85025 COMPLETE CBC W/AUTO DIFF WBC: CPT

## 2024-06-19 PROCEDURE — 87799 DETECT AGENT NOS DNA QUANT: CPT

## 2024-06-19 PROCEDURE — 99233 SBSQ HOSP IP/OBS HIGH 50: CPT

## 2024-06-19 PROCEDURE — 87476 LYME DIS DNA AMP PROBE: CPT

## 2024-06-19 PROCEDURE — 94002 VENT MGMT INPAT INIT DAY: CPT

## 2024-06-19 PROCEDURE — 87483 CNS DNA AMP PROBE TYPE 12-25: CPT

## 2024-06-19 PROCEDURE — 86592 SYPHILIS TEST NON-TREP QUAL: CPT

## 2024-06-19 PROCEDURE — 71250 CT THORAX DX C-: CPT | Mod: MC

## 2024-06-19 PROCEDURE — 99223 1ST HOSP IP/OBS HIGH 75: CPT | Mod: GC

## 2024-06-19 PROCEDURE — 82803 BLOOD GASES ANY COMBINATION: CPT

## 2024-06-19 PROCEDURE — 87640 STAPH A DNA AMP PROBE: CPT

## 2024-06-19 PROCEDURE — 82040 ASSAY OF SERUM ALBUMIN: CPT

## 2024-06-19 PROCEDURE — 83880 ASSAY OF NATRIURETIC PEPTIDE: CPT

## 2024-06-19 PROCEDURE — 86789 WEST NILE VIRUS ANTIBODY: CPT

## 2024-06-19 PROCEDURE — 82947 ASSAY GLUCOSE BLOOD QUANT: CPT

## 2024-06-19 PROCEDURE — 87641 MR-STAPH DNA AMP PROBE: CPT

## 2024-06-19 PROCEDURE — C8929: CPT

## 2024-06-19 PROCEDURE — 96375 TX/PRO/DX INJ NEW DRUG ADDON: CPT | Mod: XU

## 2024-06-19 PROCEDURE — 84295 ASSAY OF SERUM SODIUM: CPT

## 2024-06-19 PROCEDURE — 70450 CT HEAD/BRAIN W/O DYE: CPT | Mod: MC

## 2024-06-19 PROCEDURE — 31500 INSERT EMERGENCY AIRWAY: CPT

## 2024-06-19 PROCEDURE — 80307 DRUG TEST PRSMV CHEM ANLYZR: CPT

## 2024-06-19 PROCEDURE — 82784 ASSAY IGA/IGD/IGG/IGM EACH: CPT

## 2024-06-19 PROCEDURE — 82164 ANGIOTENSIN I ENZYME TEST: CPT

## 2024-06-19 PROCEDURE — 87116 MYCOBACTERIA CULTURE: CPT

## 2024-06-19 PROCEDURE — 95700 EEG CONT REC W/VID EEG TECH: CPT

## 2024-06-19 PROCEDURE — 87385 HISTOPLASMA CAPSUL AG IA: CPT

## 2024-06-19 PROCEDURE — 80048 BASIC METABOLIC PNL TOTAL CA: CPT

## 2024-06-19 PROCEDURE — 84443 ASSAY THYROID STIM HORMONE: CPT

## 2024-06-19 PROCEDURE — 84484 ASSAY OF TROPONIN QUANT: CPT

## 2024-06-19 PROCEDURE — 72125 CT NECK SPINE W/O DYE: CPT | Mod: MC

## 2024-06-19 PROCEDURE — 86788 WEST NILE VIRUS AB IGM: CPT

## 2024-06-19 PROCEDURE — 85014 HEMATOCRIT: CPT

## 2024-06-19 PROCEDURE — 84132 ASSAY OF SERUM POTASSIUM: CPT

## 2024-06-19 PROCEDURE — 96374 THER/PROPH/DIAG INJ IV PUSH: CPT | Mod: XU

## 2024-06-19 RX ORDER — OLANZAPINE 2.5 MG/1
20 TABLET, FILM COATED ORAL AT BEDTIME
Refills: 0 | Status: DISCONTINUED | OUTPATIENT
Start: 2024-06-19 | End: 2024-07-02

## 2024-06-19 RX ORDER — DIPHENHYDRAMINE HCL 12.5MG/5ML
50 ELIXIR ORAL ONCE
Refills: 0 | Status: DISCONTINUED | OUTPATIENT
Start: 2024-06-19 | End: 2024-07-02

## 2024-06-19 RX ORDER — LORAZEPAM 0.5 MG
2 TABLET ORAL EVERY 6 HOURS
Refills: 0 | Status: DISCONTINUED | OUTPATIENT
Start: 2024-06-19 | End: 2024-06-19

## 2024-06-19 RX ORDER — ALBUTEROL 90 MCG
2 AEROSOL REFILL (GRAM) INHALATION EVERY 4 HOURS
Refills: 0 | Status: DISCONTINUED | OUTPATIENT
Start: 2024-06-19 | End: 2024-07-02

## 2024-06-19 RX ORDER — DIPHENHYDRAMINE HCL 12.5MG/5ML
50 ELIXIR ORAL EVERY 6 HOURS
Refills: 0 | Status: DISCONTINUED | OUTPATIENT
Start: 2024-06-19 | End: 2024-06-19

## 2024-06-19 RX ORDER — BUDESONIDE/FORMOTEROL FUMARATE 160-4.5MCG
2 HFA AEROSOL WITH ADAPTER (GRAM) INHALATION
Refills: 0 | Status: DISCONTINUED | OUTPATIENT
Start: 2024-06-19 | End: 2024-07-02

## 2024-06-19 RX ORDER — HALOPERIDOL DECANOATE 100 MG/ML
5 VIAL (ML) INTRAMUSCULAR EVERY 6 HOURS
Refills: 0 | Status: DISCONTINUED | OUTPATIENT
Start: 2024-06-19 | End: 2024-07-02

## 2024-06-19 RX ORDER — LAMOTRIGINE 100 MG/1
25 TABLET ORAL DAILY
Refills: 0 | Status: DISCONTINUED | OUTPATIENT
Start: 2024-06-19 | End: 2024-07-02

## 2024-06-19 RX ORDER — NICOTINE POLACRILEX 2 MG/1
1 LOZENGE ORAL DAILY
Refills: 0 | Status: DISCONTINUED | OUTPATIENT
Start: 2024-06-19 | End: 2024-07-02

## 2024-06-19 RX ORDER — PANTOPRAZOLE SODIUM 40 MG/10ML
40 INJECTION, POWDER, FOR SOLUTION INTRAVENOUS
Refills: 0 | Status: DISCONTINUED | OUTPATIENT
Start: 2024-06-19 | End: 2024-07-02

## 2024-06-19 RX ORDER — LORAZEPAM 0.5 MG
2 TABLET ORAL EVERY 6 HOURS
Refills: 0 | Status: DISCONTINUED | OUTPATIENT
Start: 2024-06-19 | End: 2024-06-26

## 2024-06-19 RX ORDER — HALOPERIDOL DECANOATE 100 MG/ML
5 VIAL (ML) INTRAMUSCULAR EVERY 6 HOURS
Refills: 0 | Status: DISCONTINUED | OUTPATIENT
Start: 2024-06-19 | End: 2024-06-19

## 2024-06-19 RX ORDER — HALOPERIDOL DECANOATE 100 MG/ML
5 VIAL (ML) INTRAMUSCULAR ONCE
Refills: 0 | Status: DISCONTINUED | OUTPATIENT
Start: 2024-06-19 | End: 2024-07-02

## 2024-06-19 RX ORDER — TIOTROPIUM BROMIDE 18 UG/1
2 CAPSULE ORAL; RESPIRATORY (INHALATION) DAILY
Refills: 0 | Status: DISCONTINUED | OUTPATIENT
Start: 2024-06-19 | End: 2024-07-02

## 2024-06-19 RX ORDER — LORAZEPAM 0.5 MG
2 TABLET ORAL ONCE
Refills: 0 | Status: DISCONTINUED | OUTPATIENT
Start: 2024-06-19 | End: 2024-06-26

## 2024-06-19 RX ORDER — DIPHENHYDRAMINE HCL 12.5MG/5ML
50 ELIXIR ORAL EVERY 6 HOURS
Refills: 0 | Status: DISCONTINUED | OUTPATIENT
Start: 2024-06-19 | End: 2024-07-02

## 2024-06-19 RX ADMIN — POLYETHYLENE GLYCOL 3350 17 GRAM(S): 1 POWDER ORAL at 10:56

## 2024-06-19 RX ADMIN — NICOTINE POLACRILEX 1 PATCH: 2 LOZENGE ORAL at 07:45

## 2024-06-19 RX ADMIN — TIOTROPIUM BROMIDE 2 PUFF(S): 18 CAPSULE ORAL; RESPIRATORY (INHALATION) at 07:44

## 2024-06-19 RX ADMIN — ENOXAPARIN SODIUM 40 MILLIGRAM(S): 100 INJECTION SUBCUTANEOUS at 10:56

## 2024-06-19 RX ADMIN — Medication 2 PUFF(S): at 07:43

## 2024-06-19 RX ADMIN — OLANZAPINE 20 MILLIGRAM(S): 2.5 TABLET, FILM COATED ORAL at 20:20

## 2024-06-19 RX ADMIN — Medication 1 TABLET(S): at 10:56

## 2024-06-19 RX ADMIN — Medication 1 APPLICATION(S): at 10:56

## 2024-06-20 LAB — H CAPSUL AG CSF IA-MCNC: SIGNIFICANT CHANGE UP

## 2024-06-20 PROCEDURE — 99223 1ST HOSP IP/OBS HIGH 75: CPT

## 2024-06-20 PROCEDURE — 99232 SBSQ HOSP IP/OBS MODERATE 35: CPT

## 2024-06-20 PROCEDURE — 90853 GROUP PSYCHOTHERAPY: CPT

## 2024-06-20 RX ORDER — MAGNESIUM, ALUMINUM HYDROXIDE 400-400
30 TABLET,CHEWABLE ORAL EVERY 6 HOURS
Refills: 0 | Status: DISCONTINUED | OUTPATIENT
Start: 2024-06-20 | End: 2024-07-02

## 2024-06-20 RX ADMIN — LAMOTRIGINE 25 MILLIGRAM(S): 100 TABLET ORAL at 08:30

## 2024-06-20 RX ADMIN — OLANZAPINE 20 MILLIGRAM(S): 2.5 TABLET, FILM COATED ORAL at 20:29

## 2024-06-20 RX ADMIN — TIOTROPIUM BROMIDE 2 PUFF(S): 18 CAPSULE ORAL; RESPIRATORY (INHALATION) at 09:17

## 2024-06-20 RX ADMIN — Medication 2 PUFF(S): at 09:17

## 2024-06-20 RX ADMIN — NICOTINE POLACRILEX 1 PATCH: 2 LOZENGE ORAL at 08:01

## 2024-06-21 PROCEDURE — 90853 GROUP PSYCHOTHERAPY: CPT

## 2024-06-21 PROCEDURE — 99232 SBSQ HOSP IP/OBS MODERATE 35: CPT

## 2024-06-21 RX ADMIN — NICOTINE POLACRILEX 1 PATCH: 2 LOZENGE ORAL at 08:16

## 2024-06-21 RX ADMIN — OLANZAPINE 20 MILLIGRAM(S): 2.5 TABLET, FILM COATED ORAL at 20:09

## 2024-06-21 RX ADMIN — LAMOTRIGINE 25 MILLIGRAM(S): 100 TABLET ORAL at 08:16

## 2024-06-21 RX ADMIN — TIOTROPIUM BROMIDE 2 PUFF(S): 18 CAPSULE ORAL; RESPIRATORY (INHALATION) at 08:17

## 2024-06-21 RX ADMIN — Medication 2 PUFF(S): at 08:16

## 2024-06-22 PROCEDURE — 99232 SBSQ HOSP IP/OBS MODERATE 35: CPT

## 2024-06-22 RX ADMIN — LAMOTRIGINE 25 MILLIGRAM(S): 100 TABLET ORAL at 08:05

## 2024-06-22 RX ADMIN — NICOTINE POLACRILEX 1 PATCH: 2 LOZENGE ORAL at 08:07

## 2024-06-22 RX ADMIN — Medication 2 PUFF(S): at 08:06

## 2024-06-22 RX ADMIN — NICOTINE POLACRILEX 1 PATCH: 2 LOZENGE ORAL at 07:49

## 2024-06-22 RX ADMIN — NICOTINE POLACRILEX 1 PATCH: 2 LOZENGE ORAL at 08:58

## 2024-06-22 RX ADMIN — OLANZAPINE 20 MILLIGRAM(S): 2.5 TABLET, FILM COATED ORAL at 20:15

## 2024-06-22 RX ADMIN — TIOTROPIUM BROMIDE 2 PUFF(S): 18 CAPSULE ORAL; RESPIRATORY (INHALATION) at 08:06

## 2024-06-23 PROCEDURE — 99232 SBSQ HOSP IP/OBS MODERATE 35: CPT

## 2024-06-23 RX ADMIN — OLANZAPINE 20 MILLIGRAM(S): 2.5 TABLET, FILM COATED ORAL at 20:21

## 2024-06-23 RX ADMIN — TIOTROPIUM BROMIDE 2 PUFF(S): 18 CAPSULE ORAL; RESPIRATORY (INHALATION) at 08:13

## 2024-06-23 RX ADMIN — NICOTINE POLACRILEX 1 PATCH: 2 LOZENGE ORAL at 08:12

## 2024-06-23 RX ADMIN — NICOTINE POLACRILEX 1 PATCH: 2 LOZENGE ORAL at 08:02

## 2024-06-23 RX ADMIN — NICOTINE POLACRILEX 1 PATCH: 2 LOZENGE ORAL at 08:11

## 2024-06-23 RX ADMIN — LAMOTRIGINE 25 MILLIGRAM(S): 100 TABLET ORAL at 08:02

## 2024-06-23 RX ADMIN — Medication 2 PUFF(S): at 08:13

## 2024-06-24 PROCEDURE — 99232 SBSQ HOSP IP/OBS MODERATE 35: CPT

## 2024-06-24 PROCEDURE — 90853 GROUP PSYCHOTHERAPY: CPT

## 2024-06-24 RX ORDER — FLUOXETINE HCL 40 MG
20 CAPSULE ORAL DAILY
Refills: 0 | Status: DISCONTINUED | OUTPATIENT
Start: 2024-06-25 | End: 2024-07-02

## 2024-06-24 RX ORDER — FLUOXETINE HCL 40 MG
20 CAPSULE ORAL ONCE
Refills: 0 | Status: COMPLETED | OUTPATIENT
Start: 2024-06-24 | End: 2024-06-24

## 2024-06-24 RX ADMIN — NICOTINE POLACRILEX 1 PATCH: 2 LOZENGE ORAL at 08:09

## 2024-06-24 RX ADMIN — NICOTINE POLACRILEX 1 PATCH: 2 LOZENGE ORAL at 08:45

## 2024-06-24 RX ADMIN — NICOTINE POLACRILEX 1 PATCH: 2 LOZENGE ORAL at 18:17

## 2024-06-24 RX ADMIN — NICOTINE POLACRILEX 1 PATCH: 2 LOZENGE ORAL at 07:24

## 2024-06-24 RX ADMIN — LAMOTRIGINE 25 MILLIGRAM(S): 100 TABLET ORAL at 08:09

## 2024-06-24 RX ADMIN — OLANZAPINE 20 MILLIGRAM(S): 2.5 TABLET, FILM COATED ORAL at 20:07

## 2024-06-24 RX ADMIN — Medication 20 MILLIGRAM(S): at 11:50

## 2024-06-24 RX ADMIN — PANTOPRAZOLE SODIUM 40 MILLIGRAM(S): 40 INJECTION, POWDER, FOR SOLUTION INTRAVENOUS at 08:10

## 2024-06-25 LAB — MOG AB CSF QL CBA IFA: NEGATIVE — SIGNIFICANT CHANGE UP

## 2024-06-25 PROCEDURE — 90853 GROUP PSYCHOTHERAPY: CPT

## 2024-06-25 RX ADMIN — Medication 20 MILLIGRAM(S): at 08:03

## 2024-06-25 RX ADMIN — NICOTINE POLACRILEX 1 PATCH: 2 LOZENGE ORAL at 08:05

## 2024-06-25 RX ADMIN — OLANZAPINE 20 MILLIGRAM(S): 2.5 TABLET, FILM COATED ORAL at 20:05

## 2024-06-25 RX ADMIN — LAMOTRIGINE 25 MILLIGRAM(S): 100 TABLET ORAL at 08:03

## 2024-06-25 RX ADMIN — TIOTROPIUM BROMIDE 2 PUFF(S): 18 CAPSULE ORAL; RESPIRATORY (INHALATION) at 08:54

## 2024-06-26 PROCEDURE — 99232 SBSQ HOSP IP/OBS MODERATE 35: CPT

## 2024-06-26 PROCEDURE — 90853 GROUP PSYCHOTHERAPY: CPT

## 2024-06-26 RX ORDER — LORAZEPAM 0.5 MG
2 TABLET ORAL ONCE
Refills: 0 | Status: DISCONTINUED | OUTPATIENT
Start: 2024-06-26 | End: 2024-07-02

## 2024-06-26 RX ORDER — LORAZEPAM 0.5 MG
1 TABLET ORAL EVERY 6 HOURS
Refills: 0 | Status: DISCONTINUED | OUTPATIENT
Start: 2024-06-26 | End: 2024-07-02

## 2024-06-26 RX ADMIN — NICOTINE POLACRILEX 1 PATCH: 2 LOZENGE ORAL at 08:11

## 2024-06-26 RX ADMIN — LAMOTRIGINE 25 MILLIGRAM(S): 100 TABLET ORAL at 08:11

## 2024-06-26 RX ADMIN — Medication 20 MILLIGRAM(S): at 08:11

## 2024-06-26 RX ADMIN — NICOTINE POLACRILEX 1 PATCH: 2 LOZENGE ORAL at 07:36

## 2024-06-26 RX ADMIN — TIOTROPIUM BROMIDE 2 PUFF(S): 18 CAPSULE ORAL; RESPIRATORY (INHALATION) at 08:13

## 2024-06-26 RX ADMIN — OLANZAPINE 20 MILLIGRAM(S): 2.5 TABLET, FILM COATED ORAL at 20:24

## 2024-06-27 PROCEDURE — 90853 GROUP PSYCHOTHERAPY: CPT

## 2024-06-27 RX ADMIN — OLANZAPINE 20 MILLIGRAM(S): 2.5 TABLET, FILM COATED ORAL at 20:25

## 2024-06-27 RX ADMIN — Medication 2 PUFF(S): at 08:06

## 2024-06-27 RX ADMIN — NICOTINE POLACRILEX 1 PATCH: 2 LOZENGE ORAL at 19:26

## 2024-06-27 RX ADMIN — Medication 20 MILLIGRAM(S): at 08:05

## 2024-06-27 RX ADMIN — Medication 2 PUFF(S): at 20:28

## 2024-06-27 RX ADMIN — NICOTINE POLACRILEX 1 PATCH: 2 LOZENGE ORAL at 08:05

## 2024-06-27 RX ADMIN — TIOTROPIUM BROMIDE 2 PUFF(S): 18 CAPSULE ORAL; RESPIRATORY (INHALATION) at 08:06

## 2024-06-27 RX ADMIN — LAMOTRIGINE 25 MILLIGRAM(S): 100 TABLET ORAL at 08:05

## 2024-06-28 PROCEDURE — 90853 GROUP PSYCHOTHERAPY: CPT

## 2024-06-28 PROCEDURE — 99232 SBSQ HOSP IP/OBS MODERATE 35: CPT

## 2024-06-28 RX ADMIN — NICOTINE POLACRILEX 1 PATCH: 2 LOZENGE ORAL at 19:45

## 2024-06-28 RX ADMIN — LAMOTRIGINE 25 MILLIGRAM(S): 100 TABLET ORAL at 08:01

## 2024-06-28 RX ADMIN — NICOTINE POLACRILEX 1 PATCH: 2 LOZENGE ORAL at 08:01

## 2024-06-28 RX ADMIN — OLANZAPINE 20 MILLIGRAM(S): 2.5 TABLET, FILM COATED ORAL at 20:09

## 2024-06-28 RX ADMIN — TIOTROPIUM BROMIDE 2 PUFF(S): 18 CAPSULE ORAL; RESPIRATORY (INHALATION) at 18:39

## 2024-06-28 RX ADMIN — Medication 20 MILLIGRAM(S): at 08:01

## 2024-06-29 RX ADMIN — NICOTINE POLACRILEX 1 PATCH: 2 LOZENGE ORAL at 07:42

## 2024-06-29 RX ADMIN — Medication 20 MILLIGRAM(S): at 08:12

## 2024-06-29 RX ADMIN — NICOTINE POLACRILEX 1 PATCH: 2 LOZENGE ORAL at 07:47

## 2024-06-29 RX ADMIN — LAMOTRIGINE 25 MILLIGRAM(S): 100 TABLET ORAL at 08:12

## 2024-06-29 RX ADMIN — OLANZAPINE 20 MILLIGRAM(S): 2.5 TABLET, FILM COATED ORAL at 20:26

## 2024-06-29 RX ADMIN — TIOTROPIUM BROMIDE 2 PUFF(S): 18 CAPSULE ORAL; RESPIRATORY (INHALATION) at 09:14

## 2024-06-30 RX ADMIN — LAMOTRIGINE 25 MILLIGRAM(S): 100 TABLET ORAL at 08:25

## 2024-06-30 RX ADMIN — NICOTINE POLACRILEX 1 PATCH: 2 LOZENGE ORAL at 09:17

## 2024-06-30 RX ADMIN — NICOTINE POLACRILEX 1 PATCH: 2 LOZENGE ORAL at 08:09

## 2024-06-30 RX ADMIN — TIOTROPIUM BROMIDE 2 PUFF(S): 18 CAPSULE ORAL; RESPIRATORY (INHALATION) at 08:25

## 2024-06-30 RX ADMIN — OLANZAPINE 20 MILLIGRAM(S): 2.5 TABLET, FILM COATED ORAL at 20:05

## 2024-06-30 RX ADMIN — Medication 20 MILLIGRAM(S): at 08:25

## 2024-07-01 PROCEDURE — 90853 GROUP PSYCHOTHERAPY: CPT

## 2024-07-01 PROCEDURE — 99232 SBSQ HOSP IP/OBS MODERATE 35: CPT

## 2024-07-01 RX ORDER — BUDESONIDE/FORMOTEROL FUMARATE 160-4.5MCG
2 HFA AEROSOL WITH ADAPTER (GRAM) INHALATION
Qty: 1 | Refills: 0
Start: 2024-07-01 | End: 2024-07-30

## 2024-07-01 RX ORDER — PANTOPRAZOLE SODIUM 40 MG/10ML
1 INJECTION, POWDER, FOR SOLUTION INTRAVENOUS
Qty: 30 | Refills: 0
Start: 2024-07-01 | End: 2024-07-30

## 2024-07-01 RX ORDER — FLUOXETINE HCL 40 MG
1 CAPSULE ORAL
Qty: 30 | Refills: 0
Start: 2024-07-01 | End: 2024-07-30

## 2024-07-01 RX ORDER — NICOTINE POLACRILEX 2 MG/1
1 LOZENGE ORAL
Qty: 30 | Refills: 0
Start: 2024-07-01 | End: 2024-07-30

## 2024-07-01 RX ORDER — LAMOTRIGINE 100 MG/1
1 TABLET ORAL
Refills: 0 | DISCHARGE

## 2024-07-01 RX ORDER — ALBUTEROL 90 MCG
2 AEROSOL REFILL (GRAM) INHALATION
Qty: 1 | Refills: 0
Start: 2024-07-01 | End: 2024-07-30

## 2024-07-01 RX ORDER — OLANZAPINE 2.5 MG/1
1 TABLET, FILM COATED ORAL
Refills: 0 | DISCHARGE

## 2024-07-01 RX ORDER — FLUTICASONE FUROATE, UMECLIDINIUM BROMIDE AND VILANTEROL TRIFENATATE 200; 62.5; 25 UG/1; UG/1; UG/1
1 POWDER RESPIRATORY (INHALATION)
Refills: 0 | DISCHARGE

## 2024-07-01 RX ORDER — OLANZAPINE 2.5 MG/1
1 TABLET, FILM COATED ORAL
Qty: 30 | Refills: 0
Start: 2024-07-01 | End: 2024-07-30

## 2024-07-01 RX ORDER — LAMOTRIGINE 100 MG/1
1 TABLET ORAL
Qty: 30 | Refills: 0
Start: 2024-07-01 | End: 2024-07-30

## 2024-07-01 RX ORDER — TIOTROPIUM BROMIDE 18 UG/1
2 CAPSULE ORAL; RESPIRATORY (INHALATION)
Qty: 1 | Refills: 0
Start: 2024-07-01 | End: 2024-07-30

## 2024-07-01 RX ADMIN — LAMOTRIGINE 25 MILLIGRAM(S): 100 TABLET ORAL at 08:04

## 2024-07-01 RX ADMIN — Medication 20 MILLIGRAM(S): at 08:03

## 2024-07-01 RX ADMIN — TIOTROPIUM BROMIDE 2 PUFF(S): 18 CAPSULE ORAL; RESPIRATORY (INHALATION) at 08:06

## 2024-07-01 RX ADMIN — NICOTINE POLACRILEX 1 PATCH: 2 LOZENGE ORAL at 08:02

## 2024-07-01 RX ADMIN — OLANZAPINE 20 MILLIGRAM(S): 2.5 TABLET, FILM COATED ORAL at 20:17

## 2024-07-02 VITALS — TEMPERATURE: 98 F

## 2024-07-02 RX ADMIN — Medication 20 MILLIGRAM(S): at 08:01

## 2024-07-02 RX ADMIN — LAMOTRIGINE 25 MILLIGRAM(S): 100 TABLET ORAL at 08:01

## 2024-07-02 RX ADMIN — TIOTROPIUM BROMIDE 2 PUFF(S): 18 CAPSULE ORAL; RESPIRATORY (INHALATION) at 08:01

## 2024-07-02 RX ADMIN — NICOTINE POLACRILEX 1 PATCH: 2 LOZENGE ORAL at 08:00

## 2024-07-10 LAB
C NEOFORM RRNA SPEC NAA+PROBE-ACNC: SIGNIFICANT CHANGE UP
CMV DNA CSF QL NAA+PROBE: SIGNIFICANT CHANGE UP
E COLI K1 DNA CSF QL NAA+NON-PROBE: SIGNIFICANT CHANGE UP
ESCHERICHIA COLI K1: SIGNIFICANT CHANGE UP
EV RNA CSF QL NAA+PROBE: SIGNIFICANT CHANGE UP
GP B STREP DNA SPEC QL NAA+PROBE: SIGNIFICANT CHANGE UP
HAEM INFLU DNA SPEC QL NAA+PROBE: SIGNIFICANT CHANGE UP
HHV6 DNA CSF QL NAA+PROBE: SIGNIFICANT CHANGE UP
HSV1 DNA CSF QL NAA+PROBE: SIGNIFICANT CHANGE UP
HSV2 DNA CSF QL NAA+PROBE: SIGNIFICANT CHANGE UP
IMMUNOLOGIST REVIEW: SIGNIFICANT CHANGE UP
L MONOCYTOG DNA SPEC QL NAA+PROBE: SIGNIFICANT CHANGE UP
N MEN DNA SPEC QL NAA+PROBE: SIGNIFICANT CHANGE UP
PARECHOVIRUS A RNA SPEC QL NAA+PROBE: SIGNIFICANT CHANGE UP
S PNEUM DNA SPEC QL NAA+PROBE: SIGNIFICANT CHANGE UP
VZV DNA CSF QL NAA+PROBE: SIGNIFICANT CHANGE UP

## 2024-07-13 LAB
CULTURE RESULTS: SIGNIFICANT CHANGE UP
SPECIMEN SOURCE: SIGNIFICANT CHANGE UP

## 2024-07-22 ENCOUNTER — APPOINTMENT (OUTPATIENT)
Dept: PULMONOLOGY | Facility: CLINIC | Age: 64
End: 2024-07-22

## 2024-08-19 ENCOUNTER — INPATIENT (INPATIENT)
Facility: HOSPITAL | Age: 64
LOS: 2 days | Discharge: HOME | DRG: 192 | End: 2024-08-22
Attending: INTERNAL MEDICINE | Admitting: STUDENT IN AN ORGANIZED HEALTH CARE EDUCATION/TRAINING PROGRAM
Payer: MEDICARE

## 2024-08-19 VITALS
HEART RATE: 104 BPM | DIASTOLIC BLOOD PRESSURE: 96 MMHG | WEIGHT: 199.96 LBS | SYSTOLIC BLOOD PRESSURE: 145 MMHG | OXYGEN SATURATION: 99 % | RESPIRATION RATE: 22 BRPM | HEIGHT: 67 IN | TEMPERATURE: 98 F

## 2024-08-19 LAB
ALBUMIN SERPL ELPH-MCNC: 4.1 G/DL — SIGNIFICANT CHANGE UP (ref 3.3–5.2)
ALP SERPL-CCNC: 93 U/L — SIGNIFICANT CHANGE UP (ref 40–120)
ALT FLD-CCNC: 23 U/L — SIGNIFICANT CHANGE UP
ANION GAP SERPL CALC-SCNC: 14 MMOL/L — SIGNIFICANT CHANGE UP (ref 5–17)
AST SERPL-CCNC: 25 U/L — SIGNIFICANT CHANGE UP
BASOPHILS # BLD AUTO: 0.09 K/UL — SIGNIFICANT CHANGE UP (ref 0–0.2)
BASOPHILS NFR BLD AUTO: 0.6 % — SIGNIFICANT CHANGE UP (ref 0–2)
BILIRUB SERPL-MCNC: 0.3 MG/DL — LOW (ref 0.4–2)
BUN SERPL-MCNC: 10.5 MG/DL — SIGNIFICANT CHANGE UP (ref 8–20)
CALCIUM SERPL-MCNC: 9.1 MG/DL — SIGNIFICANT CHANGE UP (ref 8.4–10.5)
CHLORIDE SERPL-SCNC: 100 MMOL/L — SIGNIFICANT CHANGE UP (ref 96–108)
CO2 SERPL-SCNC: 25 MMOL/L — SIGNIFICANT CHANGE UP (ref 22–29)
CREAT SERPL-MCNC: 0.74 MG/DL — SIGNIFICANT CHANGE UP (ref 0.5–1.3)
EGFR: 91 ML/MIN/1.73M2 — SIGNIFICANT CHANGE UP
EOSINOPHIL # BLD AUTO: 0.12 K/UL — SIGNIFICANT CHANGE UP (ref 0–0.5)
EOSINOPHIL NFR BLD AUTO: 0.9 % — SIGNIFICANT CHANGE UP (ref 0–6)
FLUAV AG NPH QL: SIGNIFICANT CHANGE UP
FLUBV AG NPH QL: SIGNIFICANT CHANGE UP
GLUCOSE SERPL-MCNC: 95 MG/DL — SIGNIFICANT CHANGE UP (ref 70–99)
HCT VFR BLD CALC: 46.2 % — HIGH (ref 34.5–45)
HGB BLD-MCNC: 15.4 G/DL — SIGNIFICANT CHANGE UP (ref 11.5–15.5)
IMM GRANULOCYTES NFR BLD AUTO: 0.4 % — SIGNIFICANT CHANGE UP (ref 0–0.9)
LYMPHOCYTES # BLD AUTO: 23.5 % — SIGNIFICANT CHANGE UP (ref 13–44)
LYMPHOCYTES # BLD AUTO: 3.26 K/UL — SIGNIFICANT CHANGE UP (ref 1–3.3)
MCHC RBC-ENTMCNC: 28 PG — SIGNIFICANT CHANGE UP (ref 27–34)
MCHC RBC-ENTMCNC: 33.3 GM/DL — SIGNIFICANT CHANGE UP (ref 32–36)
MCV RBC AUTO: 84 FL — SIGNIFICANT CHANGE UP (ref 80–100)
MONOCYTES # BLD AUTO: 0.85 K/UL — SIGNIFICANT CHANGE UP (ref 0–0.9)
MONOCYTES NFR BLD AUTO: 6.1 % — SIGNIFICANT CHANGE UP (ref 2–14)
NEUTROPHILS # BLD AUTO: 9.48 K/UL — HIGH (ref 1.8–7.4)
NEUTROPHILS NFR BLD AUTO: 68.5 % — SIGNIFICANT CHANGE UP (ref 43–77)
NT-PROBNP SERPL-SCNC: 88 PG/ML — SIGNIFICANT CHANGE UP (ref 0–300)
PLATELET # BLD AUTO: 301 K/UL — SIGNIFICANT CHANGE UP (ref 150–400)
POTASSIUM SERPL-MCNC: 3.9 MMOL/L — SIGNIFICANT CHANGE UP (ref 3.5–5.3)
POTASSIUM SERPL-SCNC: 3.9 MMOL/L — SIGNIFICANT CHANGE UP (ref 3.5–5.3)
PROT SERPL-MCNC: 7.3 G/DL — SIGNIFICANT CHANGE UP (ref 6.6–8.7)
RBC # BLD: 5.5 M/UL — HIGH (ref 3.8–5.2)
RBC # FLD: 13 % — SIGNIFICANT CHANGE UP (ref 10.3–14.5)
RSV RNA NPH QL NAA+NON-PROBE: SIGNIFICANT CHANGE UP
SARS-COV-2 RNA SPEC QL NAA+PROBE: SIGNIFICANT CHANGE UP
SODIUM SERPL-SCNC: 139 MMOL/L — SIGNIFICANT CHANGE UP (ref 135–145)
TROPONIN T, HIGH SENSITIVITY RESULT: 15 NG/L — SIGNIFICANT CHANGE UP (ref 0–51)
WBC # BLD: 13.85 K/UL — HIGH (ref 3.8–10.5)
WBC # FLD AUTO: 13.85 K/UL — HIGH (ref 3.8–10.5)

## 2024-08-19 PROCEDURE — 71045 X-RAY EXAM CHEST 1 VIEW: CPT | Mod: 26

## 2024-08-19 PROCEDURE — 99223 1ST HOSP IP/OBS HIGH 75: CPT | Mod: FS

## 2024-08-19 RX ORDER — METHYLPREDNISOLONE 4 MG
40 TABLET ORAL EVERY 8 HOURS
Refills: 0 | Status: DISCONTINUED | OUTPATIENT
Start: 2024-08-20 | End: 2024-08-21

## 2024-08-19 RX ORDER — PREDNISONE 10 MG
1 TABLET, DOSE PACK ORAL
Qty: 5 | Refills: 0
Start: 2024-08-19 | End: 2024-08-23

## 2024-08-19 RX ORDER — IPRATROPIUM BROMIDE AND ALBUTEROL SULFATE .5; 3 MG/3ML; MG/3ML
3 SOLUTION RESPIRATORY (INHALATION) ONCE
Refills: 0 | Status: COMPLETED | OUTPATIENT
Start: 2024-08-19 | End: 2024-08-19

## 2024-08-19 RX ORDER — TRAZODONE HCL 50 MG
50 TABLET ORAL ONCE
Refills: 0 | Status: COMPLETED | OUTPATIENT
Start: 2024-08-19 | End: 2024-08-19

## 2024-08-19 RX ORDER — IPRATROPIUM BROMIDE AND ALBUTEROL SULFATE .5; 3 MG/3ML; MG/3ML
3 SOLUTION RESPIRATORY (INHALATION) EVERY 6 HOURS
Refills: 0 | Status: DISCONTINUED | OUTPATIENT
Start: 2024-08-19 | End: 2024-08-22

## 2024-08-19 RX ORDER — METHYLPREDNISOLONE 4 MG
125 TABLET ORAL ONCE
Refills: 0 | Status: COMPLETED | OUTPATIENT
Start: 2024-08-19 | End: 2024-08-19

## 2024-08-19 RX ORDER — ALPRAZOLAM 0.25 MG
1 TABLET ORAL
Qty: 8 | Refills: 0
Start: 2024-08-19 | End: 2024-08-22

## 2024-08-19 RX ORDER — ALPRAZOLAM 0.25 MG
0.5 TABLET ORAL ONCE
Refills: 0 | Status: DISCONTINUED | OUTPATIENT
Start: 2024-08-19 | End: 2024-08-19

## 2024-08-19 RX ADMIN — IPRATROPIUM BROMIDE AND ALBUTEROL SULFATE 3 MILLILITER(S): .5; 3 SOLUTION RESPIRATORY (INHALATION) at 20:23

## 2024-08-19 RX ADMIN — Medication 150 GRAM(S): at 18:33

## 2024-08-19 RX ADMIN — IPRATROPIUM BROMIDE AND ALBUTEROL SULFATE 3 MILLILITER(S): .5; 3 SOLUTION RESPIRATORY (INHALATION) at 18:33

## 2024-08-19 RX ADMIN — Medication 125 MILLIGRAM(S): at 18:33

## 2024-08-19 RX ADMIN — Medication 50 MILLIGRAM(S): at 23:11

## 2024-08-19 RX ADMIN — Medication 0.5 MILLIGRAM(S): at 23:11

## 2024-08-19 NOTE — ED PROVIDER NOTE - CLINICAL SUMMARY MEDICAL DECISION MAKING FREE TEXT BOX
64 y/o Female with PMH of HTN, schizoaffective disorder, and prior suicide attempt BIBA for panic attack around 3pm today.  States that she has been having panic attacks for the past 3 days. Endorses palpitations, SOB, and sweating    On physical exam, diffuse expiratory wheezing B/L. 3 duoneb treatments, one treatment of IV magnesium sulfate 2g, and SOLU-Medrol injectable given. EKG show NSR. CXR negative. 62 y/o Female with PMH of HTN, schizoaffective disorder, and prior suicide attempt BIBA for panic attack around 3pm today.  States that she has been having panic attacks for the past 3 days. Endorses palpitations, SOB, and sweating    On physical exam, diffuse expiratory wheezing B/L. 3 duoneb treatments, one treatment of IV magnesium sulfate 2g, and SOLU-Medrol injectable given. EKG show NSR. CXR negative.    Pt advised to f/u with PCP and return to ER if sxs worsen or do not improve. 62 y/o Female with PMH of HTN, schizoaffective disorder, and prior suicide attempt BIBA for panic attack around 3pm today.  States that she has been having panic attacks for the past 3 days. Endorses palpitations, SOB, and sweating    On physical exam, diffuse expiratory wheezing B/L. 3 duoneb treatments, one treatment of IV magnesium sulfate 2g, and SOLU-Medrol injectable given. EKG show NSR. CXR negative.    Pt improved after meds but pulse ox remains 90%, placed on obs

## 2024-08-19 NOTE — ED PROVIDER NOTE - ATTENDING CONTRIBUTION TO CARE
I, Abelardo Nugent, performed a face to face bedside interview with this patient regarding history of present illness, and completed an independent physical examination. I personally made/approved the management plan and take responsibility for the patient management. I have communicated the patient’s plan of care and disposition with the resident  63-year-old female presents with schizoaffective disorder, asthma, hypertension presents with shortness of breath.  Patient reports that for the last 3 days she has been short of breath and wheezing, with cough consistent with prior asthma reservations.  She reports that when she feels short of breath she started feel very anxious and has panic attacks.  She denies any chest pain, fevers, hemoptysis, lower extremity edema.  Gen: NAD, well appearing  CV: RRR  Pul: wheezing diffusely  Abd: Soft, non-distended, non-tender  Neuro: no focal deficits  Pt improved,   Wheezing improved markedly, no hypoxia or increased respiratory effort, labs EKG chest without acute pathology.  Patient denies SI is an acute risk to self or others.  Patient has an appointment next week psychiatry for her panic attacks.  We will refill the patient's albuterol nebulizer, prednisone for asthma, stable for dc I, Abelardo Nugent, performed a face to face bedside interview with this patient regarding history of present illness, and completed an independent physical examination. I personally made/approved the management plan and take responsibility for the patient management. I have communicated the patient’s plan of care and disposition with the resident  63-year-old female presents with schizoaffective disorder, asthma, hypertension presents with shortness of breath.  Patient reports that for the last 3 days she has been short of breath and wheezing, with cough consistent with prior asthma reservations.  She reports that when she feels short of breath she started feel very anxious and has panic attacks.  She denies any chest pain, fevers, hemoptysis, lower extremity edema.  Gen: NAD, well appearing  CV: RRR  Pul: wheezing diffusely  Abd: Soft, non-distended, non-tender  Neuro: no focal deficits   Wheezing improved, but pulse remains borderline low at 90%, placed on obs for copd protocol

## 2024-08-19 NOTE — ED ADULT NURSE REASSESSMENT NOTE - NS ED NURSE REASSESS COMMENT FT1
Assumed care of patient at 1930, resting on stretcher in no acute distress.  Pt is A&Ox4, under observation services due to hypoxia on RA.  Pt maintaining spo2 as charted on 2lpm n/c.  Respirations even and unlabored, no other concerns voiced.

## 2024-08-19 NOTE — ED PROVIDER NOTE - PHYSICAL EXAMINATION
General: NAD, A&Ox3  HEENT: Normocephalic, atraumatic  Neck: No apparent stiffness or JVD  Pulm: Mild diffuse expiratory wheezing B/L  Cardiac: Regular rate and regular rhythm  Abdomen: Nontender and nondistended  Skin: Skin is warm, dry and intact without rashes or lesions.  Neuro: No motor or sensory deficits above reported baseline  MSK: No deformity or tenderness above reported baseline

## 2024-08-19 NOTE — ED CDU PROVIDER INITIAL DAY NOTE - ATTENDING APP SHARED VISIT CONTRIBUTION OF CARE
pt with COPD accepted to observation on COPD protocol  At this time minimal wheezing  no respiratory distress  agree w serial nebs and steroids  will reassess   agree w treatment plan

## 2024-08-19 NOTE — ED PROVIDER NOTE - NSFOLLOWUPINSTRUCTIONS_ED_ALL_ED_FT
**************************************************************************************    Asthma Attack  Upper body outline showing parts of the respiratory system, highlighting the bronchi.  Asthma attack, also called asthma flare or acute bronchospasm, is the sudden narrowing and tightening of the lower airways (bronchi) in the lungs, that can make it hard to breathe. The narrowing is caused by inflammation and tightening of the smooth muscle that wraps around the lower airways in the lungs.    Asthma attacks may cause coughing, high-pitched whistling sounds when you breathe, most often when you breathe out (wheezing), trouble breathing (shortness of breath), and chest pain. The airways may produce extra mucus caused by the inflammation and irritation. During an asthma attack, it can be difficult to breathe. It is important to get treatment right away. Asthma attacks can range from minor to life-threatening.    What are the causes?  Possible causes or triggers of this condition include:  Household allergens like dust, pet dander, and cockroaches.  Mold and pollen from trees or grass.  Air pollutants such as household , aerosol sprays, strong odors, and smoke of any kind.  Weather changes and cold air.  Stress or strong emotions such as crying or laughing hard.  Exercise or activity that requires a lot of energy.  Certain medicines or medical conditions such as:  Aspirin or beta-blockers.  Infections or inflammatory conditions, such as a flu (influenza), a cold, pneumonia, or inflammation of the nasal membranes (rhinitis).  Gastroesophageal reflux disease (GERD). GERD is a condition in which stomach acid backs up into your esophagus and spills into your trachea (windpipe), which can irritate your airways.  What are the signs or symptoms?  Symptoms of this condition include:  Wheezing.  Excessive coughing. This may only happen at night.  Chest tightness or pain.  Shortness of breath.  Difficulty talking in complete sentences.  Feeling like you cannot get enough air, no matter how hard you breathe (air hunger).  How is this diagnosed?  This condition may be diagnosed based on:  A physical exam and your medical history.  Your symptoms.  Tests to check for other causes of your symptoms or other conditions that may have triggered your asthma attack. These tests may include:  A chest X-ray.  Blood tests.  Lung function studies (spirometry) to evaluate the flow of air in your lungs.  How is this treated?  Treatment for this condition depends on the severity and cause of your asthma attack.  For mild attacks, you may receive medicines through a hand-held inhaler (metered dose inhaler or MDI) or through a device that turns liquid medicine into a mist (nebulizer). These medicines include:  Quick relief or rescue medicines that quickly relax the airways and lungs.  Long-acting medicines that are used daily to prevent (control) your asthma symptoms.  For moderate or severe attacks, you may be treated with steroid medicines by mouth or through an IV injection at the hospital.  For severe attacks, you may need oxygen therapy or a breathing machine (ventilator).  If your asthma attack was caused by an infection from bacteria, you will be given antibiotic medicines.  Follow these instructions at home:  Medicines    Take over-the-counter and prescription medicines only as told by your health care provider.  If you were prescribed an antibiotic medicine, take it as told by your health care provider. Do not stop using the antibiotic even if you start to feel better.  Tell your doctor if you are pregnant or may be pregnant to make sure your asthma medicine is safe to use during pregnancy.  Avoiding triggers    A sign showing that a person should not smoke.  Keep track of things that trigger your asthma attacks. Avoid exposure to these triggers.  Do not use any products that contain nicotine or tobacco. These products include cigarettes, chewing tobacco, and vaping devices, such as e-cigarettes. If you need help quitting, ask your health care provider.  When there is a lot of pollen, air pollution, or humidity, keep windows closed and use an air conditioner or go to places with air conditioning.  Asthma action plan    Work with your health care provider to make a written plan for managing and treating your asthma attacks (asthma action plan). This plan should include:  A list of your asthma triggers and how to avoid them.  A list of symptoms that you may have during an asthma attack.  Information about which medicine to take, when to take the medicine, and how much of the medicine to take.  Information to help you understand your peak flow measurements.  Daily actions that you can take to control your asthma symptoms.  Contact information for your health care providers.  If you have an asthma attack, act quickly. Follow the emergency steps on your written asthma action plan. This may prevent you from needing to go to the hospital.  Talk to a family member or close friend about your asthma action plan and who to contact in case you need help.  General instructions    Avoid excessive exercise or activity until your asthma attack goes away.  Stay up to date on all your vaccines, such as flu and pneumonia vaccines.  Keep all follow-up visits. This is important.  Contact a health care provider if:  You have followed your action plan for 1 hour and your peak flow reading is still at 50–79% of your personal best. This is in the yellow zone, which means "caution."  You need to use your quick reliever medicine more frequently than normal.  Your medicines are causing side effects, such as rash, itching, swelling, or trouble breathing.  Your symptoms do not improve after taking medicine.  You have a fever.  Get help right away if:  Your peak flow reading is less than 50% of your personal best. This is in the red zone, which means "danger."  You develop chest pain or discomfort.  Your medicines no longer seem to be helping.  You are coughing up bloody mucus.  You have a fever and your symptoms suddenly get worse.  You have trouble swallowing.  You feel very tired, and breathing becomes tiring.  These symptoms may be an emergency. Get help right away. Call 911.  Do not wait to see if the symptoms will go away.  Do not drive yourself to the hospital.  Summary  Asthma attacks are caused by narrowing or tightness in air passages, which causes shortness of breath, coughing, and wheezing.  Many things can trigger an asthma attack, such as allergens, weather changes, exercise, strong odors, and smoke of any kind.  If you have an asthma attack, act quickly. Follow the emergency steps on your written asthma action plan.  Get help right away if you have severe trouble breathing, chest pain, or fever, or if your home medicines are no longer helping with your symptoms.

## 2024-08-19 NOTE — ED ADULT NURSE NOTE - OBJECTIVE STATEMENT
AxOx4. Patient c/o panic attack x 3 days, Patient states she thinks her dosage of prozac is too high, has been on it for one month, hx of COPD and emphysema. IV placed, labs sent, medicated as per orders.

## 2024-08-19 NOTE — ED CDU PROVIDER INITIAL DAY NOTE - CLINICAL SUMMARY MEDICAL DECISION MAKING FREE TEXT BOX
62 y/o Female with PMH of HTN, COPD, schizoaffective disorder, Bipolar presents c/o sob. Pt states she has been having panic attacks and subsequently feeling sob. Used inhaler with little relief. + wheezing on exam, spo2>90 at rest on room air with decrease to 88 with exertion. will continue to monitor and continue treatments. She denies cough, congestion, fever/chills, n/v current chest pain, sick contacts. denies SI/HI. + daily smoker  - duonebs, solumedrol  - continuous pulse o2   - plan for outpt pulm follow up

## 2024-08-19 NOTE — ED PROVIDER NOTE - PATIENT PORTAL LINK FT
You can access the FollowMyHealth Patient Portal offered by [facility] by registering at the following website: [email or web address]. By joining [facility]’s FollowMyHealth portal, you will also be able to view your health information using other applications (apps) compatible with our system.

## 2024-08-19 NOTE — ED CDU PROVIDER INITIAL DAY NOTE - OBJECTIVE STATEMENT
64 y/o Female with PMH of HTN, COPD, schizoaffective disorder, Bipolar presents c/o sob. Pt states she has been having panic attacks and subsequently feeling sob. Used inhaler with little relief. + wheezing on exam, spo2>90 at rest on room air with decrease to 88 with exertion. will continue to monitor and continue treatments. She denies cough, congestion, fever/chills, n/v current chest pain, sick contacts. denies SI/HI. + daily smoker  has been seen by [facility] in the past, states he follows with Pulmonologist in [place] every 6 months

## 2024-08-19 NOTE — ED PROVIDER NOTE - OBJECTIVE STATEMENT
62 y/o Female with PMH of HTN, schizoaffective disorder, and prior suicide attempt BIBA for panic attack.  States that she has been having panic attacks for the past 3 days. Endorses palpitations, SOB, and sweating. States that episodes occur while laying down or watching TV.  Took 20 mg of Prozac around 9am. Reports hearing voices occasionally. Has mental health counselor (Kimberly) at [facility]  ([place], [place]) that meets with on Wednesdays via Telehealth.  Denies SI/HI,  N/V/D, fever, CP, paresthesias. 62 y/o Female with PMH of HTN, schizoaffective disorder, and prior suicide attempt BIBA for panic attack around 3pm today.  States that she has been having panic attacks for the past 3 days. Endorses palpitations, SOB, and sweating. States that episodes occur while laying down or watching TV.  Took 20 mg of Prozac around 9am. Reports hearing voices occasionally. Has mental health counselor (Kimberly) at [facility]  ([place], [place]) that meets with on Wednesdays via Telehealth.  Denies SI/HI,  N/V/D, fever, CP, paresthesias.

## 2024-08-19 NOTE — ED ADULT TRIAGE NOTE - CHIEF COMPLAINT QUOTE
Pt BIBA, c/o panic attack to EMS, states she has emphysema and c/o diff breathing, room air O2 92% Pt BIBA, c/o panic attack x 3 days, pt states she thinks her dosage of prozac is too high, has been on it for one month, hx of COPD and emphysema

## 2024-08-19 NOTE — ED ADULT NURSE NOTE - CHIEF COMPLAINT QUOTE
Pt BIBA, c/o panic attack x 3 days, pt states she thinks her dosage of prozac is too high, has been on it for one month, hx of COPD and emphysema

## 2024-08-19 NOTE — ED CDU PROVIDER INITIAL DAY NOTE - PHYSICAL EXAMINATION
Physical Examination:   Gen: NAD, AOx3  Head: NCAT  HEENT: EOMI, oral mucosa moist, normal conjunctiva, neck supple  Lung: + wheezing on exam   CV:  Normal perfusion  Abd: soft, NT ND  MSK: No edema, no visible deformities  Neuro: No focal neurologic deficits  Skin: No rash   Psych: normal affect

## 2024-08-20 LAB
BASE EXCESS BLDA CALC-SCNC: 4.1 MMOL/L — HIGH (ref -2–3)
BLOOD GAS COMMENTS ARTERIAL: SIGNIFICANT CHANGE UP
HCO3 BLDA-SCNC: 27 MMOL/L — SIGNIFICANT CHANGE UP (ref 21–28)
PCO2 BLDA: 35 MMHG — SIGNIFICANT CHANGE UP (ref 32–45)
PH BLDA: 7.5 — HIGH (ref 7.35–7.45)
PO2 BLDA: 66 MMHG — LOW (ref 83–108)
SAO2 % BLDA: 95.9 % — SIGNIFICANT CHANGE UP (ref 94–98)
TROPONIN T, HIGH SENSITIVITY RESULT: 10 NG/L — SIGNIFICANT CHANGE UP (ref 0–51)

## 2024-08-20 PROCEDURE — 99233 SBSQ HOSP IP/OBS HIGH 50: CPT | Mod: FS

## 2024-08-20 PROCEDURE — 71275 CT ANGIOGRAPHY CHEST: CPT | Mod: 26,MC

## 2024-08-20 RX ORDER — TRAZODONE HCL 50 MG
50 TABLET ORAL AT BEDTIME
Refills: 0 | Status: DISCONTINUED | OUTPATIENT
Start: 2024-08-20 | End: 2024-08-22

## 2024-08-20 RX ORDER — AZITHROMYCIN 500 MG/1
500 TABLET, FILM COATED ORAL ONCE
Refills: 0 | Status: COMPLETED | OUTPATIENT
Start: 2024-08-20 | End: 2024-08-20

## 2024-08-20 RX ORDER — ALPRAZOLAM 0.25 MG
0.5 TABLET ORAL AT BEDTIME
Refills: 0 | Status: DISCONTINUED | OUTPATIENT
Start: 2024-08-20 | End: 2024-08-22

## 2024-08-20 RX ORDER — FLUOXETINE HCL 20 MG/5 ML
20 SOLUTION, ORAL ORAL DAILY
Refills: 0 | Status: DISCONTINUED | OUTPATIENT
Start: 2024-08-20 | End: 2024-08-22

## 2024-08-20 RX ORDER — AZITHROMYCIN 500 MG/1
250 TABLET, FILM COATED ORAL DAILY
Refills: 0 | Status: DISCONTINUED | OUTPATIENT
Start: 2024-08-21 | End: 2024-08-22

## 2024-08-20 RX ADMIN — IPRATROPIUM BROMIDE AND ALBUTEROL SULFATE 3 MILLILITER(S): .5; 3 SOLUTION RESPIRATORY (INHALATION) at 04:56

## 2024-08-20 RX ADMIN — IPRATROPIUM BROMIDE AND ALBUTEROL SULFATE 3 MILLILITER(S): .5; 3 SOLUTION RESPIRATORY (INHALATION) at 13:42

## 2024-08-20 RX ADMIN — AZITHROMYCIN 500 MILLIGRAM(S): 500 TABLET, FILM COATED ORAL at 19:21

## 2024-08-20 RX ADMIN — Medication 0.5 MILLIGRAM(S): at 21:10

## 2024-08-20 RX ADMIN — Medication 20 MILLIGRAM(S): at 10:22

## 2024-08-20 RX ADMIN — Medication 50 MILLIGRAM(S): at 21:10

## 2024-08-20 RX ADMIN — Medication 40 MILLIGRAM(S): at 05:56

## 2024-08-20 RX ADMIN — IPRATROPIUM BROMIDE AND ALBUTEROL SULFATE 3 MILLILITER(S): .5; 3 SOLUTION RESPIRATORY (INHALATION) at 19:21

## 2024-08-20 RX ADMIN — Medication 40 MILLIGRAM(S): at 15:57

## 2024-08-20 RX ADMIN — IPRATROPIUM BROMIDE AND ALBUTEROL SULFATE 3 MILLILITER(S): .5; 3 SOLUTION RESPIRATORY (INHALATION) at 08:04

## 2024-08-20 NOTE — ED ADULT NURSE REASSESSMENT NOTE - NS ED NURSE REASSESS COMMENT FT1
Pt requesting to get an update on test results, and what plan of care is going to be, JUAN A Gilman made aware.

## 2024-08-20 NOTE — ED ADULT NURSE REASSESSMENT NOTE - NS ED NURSE REASSESS COMMENT FT1
Assumed care of pt. Pt remains on  SpO2 93% on room air. Airway patent RR even unlabored. Denies SOB.

## 2024-08-20 NOTE — ED ADULT NURSE REASSESSMENT NOTE - NS ED NURSE REASSESS COMMENT FT1
Report received at 7:15am by off going nurse. Care assumed at this time. Pt A+Ox4, respirations are equal and unlabored on 2L NC. Pt connected to  at this time. Pt denies CP, ABD pain, HA, N/V/D. Pt states occasional SOB upon activity. Pt left in position of comfort, wheels of stretcher locked and in the lowest position. Call bell within reach.

## 2024-08-20 NOTE — ED ADULT NURSE REASSESSMENT NOTE - NS ED NURSE REASSESS COMMENT FT1
Pt resting in bed, respirations are equal and unlabored on 2L NC. Pt connected to  at this time, no signs of acute distress noted. Pt left in position of comfort, wheels of stretcher locked and in the lowest position.

## 2024-08-20 NOTE — ED CDU PROVIDER SUBSEQUENT DAY NOTE - WR ORDER DATE AND TIME 1
19-Aug-2024 17:32 Quality 226: Preventive Care And Screening: Tobacco Use: Screening And Cessation Intervention: Patient screened for tobacco use and is an ex/non-smoker Detail Level: Detailed Quality 402: Tobacco Use And Help With Quitting Among Adolescents: Patient screened for tobacco and never smoked

## 2024-08-20 NOTE — ED ADULT NURSE REASSESSMENT NOTE - NS ED NURSE REASSESS COMMENT FT1
Pts sp02 dropped to 88% on room air, pt placed on 2L NC JUAN A Gilman made aware. Respirations equal and unlabored on room air. Sp02 94% at this time.

## 2024-08-20 NOTE — ED CDU PROVIDER SUBSEQUENT DAY NOTE - PROGRESS NOTE DETAILS
reports increased panic attacks within the last week with associated "lack of breathing/ feel like I cant breath/ feel like im gonna jump out of my skin", currently feeling better. reports that she has continued to smoke.   was recently inpt psych. denies si/hi/auditory or visual hallucinations   denies chest pain.   Gen: Well appearing in NAD  Head: NC/AT  Neck: trachea midline  Resp:  No distress diminished bilaterally with mild expiratory wheeze   CARD: RR no obvious murmur, no peripheral edema.   Ext: no deformities  Neuro:  A&O appears non focal  Skin:  Warm and dry as visualized  Psych:  Normal affect and mood

## 2024-08-20 NOTE — ED ADULT NURSE REASSESSMENT NOTE - NS ED NURSE REASSESS COMMENT FT1
Patient remains A&Ox4, resting on stretcher in no acute distress with O2 in progress via N/C.  Pt resting throughout the night, no complaints voiced, VSS as charted.

## 2024-08-20 NOTE — ED CDU PROVIDER SUBSEQUENT DAY NOTE - ATTENDING APP SHARED VISIT CONTRIBUTION OF CARE
Patient seen and evaluated at bedside.  ED record reviewed.  Patient reporting increased panic attacks, difficulty breathing but feels better since her initial arrival.  Patient states she had medications prescribed by her psychiatrist for anxiety, recently inpatient psychiatry but no current auditory or visual hallucinations or SI or HI.  Status post DuoNebs and Solu-Medrol magnesium.  On exam patient is in no respiratory distress, diminished airflow throughout with slight expiratory wheezes.  Patient on nasal cannula.  Nasal cannula stopped temporarily to assess oxygenation and patient desaturated to 88% so nasal cannula placed back on.  Patient now to be admitted for COPD exacerbation with new hypoxia and oxygen requirement. +smoker

## 2024-08-20 NOTE — ED ADULT NURSE REASSESSMENT NOTE - NS ED NURSE REASSESS COMMENT FT1
Pt resting in bed, respirations are equal and unlabored on 2L NC. Pt connected to . Pt denies CP, continues to c/o intermittent SOB. Pt left in position of comfort, wheels of stretcher locked and in the lowest position. Call bell within reach.

## 2024-08-21 DIAGNOSIS — J44.1 CHRONIC OBSTRUCTIVE PULMONARY DISEASE WITH (ACUTE) EXACERBATION: ICD-10-CM

## 2024-08-21 LAB
ANION GAP SERPL CALC-SCNC: 10 MMOL/L — SIGNIFICANT CHANGE UP (ref 5–17)
BUN SERPL-MCNC: 16.3 MG/DL — SIGNIFICANT CHANGE UP (ref 8–20)
CALCIUM SERPL-MCNC: 9.2 MG/DL — SIGNIFICANT CHANGE UP (ref 8.4–10.5)
CHLORIDE SERPL-SCNC: 102 MMOL/L — SIGNIFICANT CHANGE UP (ref 96–108)
CO2 SERPL-SCNC: 27 MMOL/L — SIGNIFICANT CHANGE UP (ref 22–29)
CREAT SERPL-MCNC: 0.63 MG/DL — SIGNIFICANT CHANGE UP (ref 0.5–1.3)
EGFR: 100 ML/MIN/1.73M2 — SIGNIFICANT CHANGE UP
GLUCOSE SERPL-MCNC: 145 MG/DL — HIGH (ref 70–99)
HCT VFR BLD CALC: 43.4 % — SIGNIFICANT CHANGE UP (ref 34.5–45)
HGB BLD-MCNC: 14.5 G/DL — SIGNIFICANT CHANGE UP (ref 11.5–15.5)
MCHC RBC-ENTMCNC: 27.9 PG — SIGNIFICANT CHANGE UP (ref 27–34)
MCHC RBC-ENTMCNC: 33.4 GM/DL — SIGNIFICANT CHANGE UP (ref 32–36)
MCV RBC AUTO: 83.5 FL — SIGNIFICANT CHANGE UP (ref 80–100)
PLATELET # BLD AUTO: 291 K/UL — SIGNIFICANT CHANGE UP (ref 150–400)
POTASSIUM SERPL-MCNC: 4.4 MMOL/L — SIGNIFICANT CHANGE UP (ref 3.5–5.3)
POTASSIUM SERPL-SCNC: 4.4 MMOL/L — SIGNIFICANT CHANGE UP (ref 3.5–5.3)
RBC # BLD: 5.2 M/UL — SIGNIFICANT CHANGE UP (ref 3.8–5.2)
RBC # FLD: 13.2 % — SIGNIFICANT CHANGE UP (ref 10.3–14.5)
SODIUM SERPL-SCNC: 139 MMOL/L — SIGNIFICANT CHANGE UP (ref 135–145)
WBC # BLD: 29.79 K/UL — HIGH (ref 3.8–10.5)
WBC # FLD AUTO: 29.79 K/UL — HIGH (ref 3.8–10.5)

## 2024-08-21 PROCEDURE — 99223 1ST HOSP IP/OBS HIGH 75: CPT

## 2024-08-21 PROCEDURE — 99233 SBSQ HOSP IP/OBS HIGH 50: CPT | Mod: FS

## 2024-08-21 RX ORDER — ENOXAPARIN SODIUM 100 MG/ML
40 INJECTION SUBCUTANEOUS EVERY 24 HOURS
Refills: 0 | Status: DISCONTINUED | OUTPATIENT
Start: 2024-08-21 | End: 2024-08-22

## 2024-08-21 RX ORDER — ACETAMINOPHEN 325 MG/1
650 TABLET ORAL EVERY 6 HOURS
Refills: 0 | Status: DISCONTINUED | OUTPATIENT
Start: 2024-08-21 | End: 2024-08-22

## 2024-08-21 RX ORDER — ONDANSETRON 2 MG/ML
4 INJECTION, SOLUTION INTRAMUSCULAR; INTRAVENOUS EVERY 8 HOURS
Refills: 0 | Status: DISCONTINUED | OUTPATIENT
Start: 2024-08-21 | End: 2024-08-22

## 2024-08-21 RX ORDER — MAGNESIUM, ALUMINUM HYDROXIDE 200-225/5
30 SUSPENSION, ORAL (FINAL DOSE FORM) ORAL EVERY 4 HOURS
Refills: 0 | Status: DISCONTINUED | OUTPATIENT
Start: 2024-08-21 | End: 2024-08-22

## 2024-08-21 RX ORDER — PREDNISONE 10 MG
40 TABLET, DOSE PACK ORAL DAILY
Refills: 0 | Status: DISCONTINUED | OUTPATIENT
Start: 2024-08-21 | End: 2024-08-22

## 2024-08-21 RX ADMIN — AZITHROMYCIN 250 MILLIGRAM(S): 500 TABLET, FILM COATED ORAL at 09:16

## 2024-08-21 RX ADMIN — Medication 40 MILLIGRAM(S): at 09:16

## 2024-08-21 RX ADMIN — IPRATROPIUM BROMIDE AND ALBUTEROL SULFATE 3 MILLILITER(S): .5; 3 SOLUTION RESPIRATORY (INHALATION) at 14:06

## 2024-08-21 RX ADMIN — Medication 20 MILLIGRAM(S): at 09:16

## 2024-08-21 RX ADMIN — IPRATROPIUM BROMIDE AND ALBUTEROL SULFATE 3 MILLILITER(S): .5; 3 SOLUTION RESPIRATORY (INHALATION) at 07:59

## 2024-08-21 RX ADMIN — Medication 40 MILLIGRAM(S): at 01:25

## 2024-08-21 RX ADMIN — IPRATROPIUM BROMIDE AND ALBUTEROL SULFATE 3 MILLILITER(S): .5; 3 SOLUTION RESPIRATORY (INHALATION) at 19:45

## 2024-08-21 RX ADMIN — Medication 50 MILLIGRAM(S): at 21:25

## 2024-08-21 RX ADMIN — IPRATROPIUM BROMIDE AND ALBUTEROL SULFATE 3 MILLILITER(S): .5; 3 SOLUTION RESPIRATORY (INHALATION) at 00:25

## 2024-08-21 NOTE — H&P ADULT - ASSESSMENT
62yo F active smoker with PMHx of schizoaffective, prior suicidal attempt, COPD, HTN presented to ED for "panic attack"  found to be wheezing bilaterally in the ED with respiratory distress.    Hypoxia/mild COPD exacerbation   - 64yo F active smoker with PMHx of schizoaffective, prior suicidal attempt, COPD, HTN presented to ED for "panic attack"  found to be wheezing bilaterally in the ED with respiratory distress.    Acute hypoxic respiratory failure   COPD exacerbation  -cont solu medrol Q8hrs, transition to po once stable   -cont Zithromax for antiinflammatory  -cont Duoneb, inhales   -oxygen supplement, titrate off as tolerated     schizoaffective/Depression   -pt off olanzapine and lamotrigine   -Fluoxetine 20mg daily   -Xanax 0.5mg prn   -trazodone 50mg po daily     Nicotine use disorder   -active smoker 1ppd   -smoking cessation counseling provided   -pt decline nicotine, interested in Wellbutrin      DVT ppx  -Lovenox subQ   Dispo: clinically active, dc home 1-2 days       Time-based billing (NON-critical care).     78 minutes spent on total encounter. The necessity of the time spent during the encounter on this date of service was due to:   chart review, patient evaluation, independent history taking, documentation, coordination of care and discussion with patient, and nurse.

## 2024-08-21 NOTE — ED CDU PROVIDER DISPOSITION NOTE - ATTENDING CONTRIBUTION TO CARE
63F in obs for copd exacerbation, follows jozef yancey, intermittent hypoxia here with desaturations while on the monitor, now being admitted out of concern given hypoxia to 86% on RA despite ongoing COPD exacerbation therapy

## 2024-08-21 NOTE — H&P ADULT - NSICDXPASTMEDICALHX_GEN_ALL_CORE_FT
PAST MEDICAL HISTORY:  Bipolar 1 disorder     COPD (chronic obstructive pulmonary disease)     PTSD (post-traumatic stress disorder)      No deformities present

## 2024-08-21 NOTE — PATIENT PROFILE ADULT - HAVE YOU RECENTLY LOST WEIGHT WITHOUT TRYING?
Patient is here in f/u He says he continues to have belchinga nd bloating heartburn is improving Constipation is better w Linzess but his stools are liquidy - he switched to S[place]  CT showed fecal loading  Labs showed marginally elevated Lipase at 106
No (0)

## 2024-08-21 NOTE — PATIENT PROFILE ADULT - INTERNATIONAL TRAVEL
No impairments found/functional limitations in following categories/rehab potential/therapy frequency/predicted duration of therapy intervention/anticipated discharge recommendation

## 2024-08-21 NOTE — H&P ADULT - HISTORY OF PRESENT ILLNESS
PRINCIPAL DISCHARGE DIAGNOSIS  Diagnosis: Intracranial aneurysm  Assessment and Plan of Treatment: Patient underwent pipeline stenting x3 of R MCA aneurysm. No intraoperative complications. Continue aspirin / plavix at home.      
64yo F active smoker with PMHx of schizoaffective, prior suicidal attempt, COPD, HTN presented to ED for "panic attack"  found to be wheezing bilaterally in the ED with respiratory distress. Pt was given nebs and steroid with improvement then placed on to observation. Pt with intermittent hypoxia and oxygen requirement, desated to 86% on RA placed on 2l NC while on observation now requiring hospital admission.

## 2024-08-21 NOTE — H&P ADULT - NSHPPHYSICALEXAM_GEN_ALL_CORE
T(C): 36.4 (08-20-24 @ 23:14), Max: 36.7 (08-20-24 @ 19:24)  HR: 100 (08-20-24 @ 23:57) (83 - 106)  BP: 139/70 (08-20-24 @ 23:57) (123/73 - 152/76)  RR: 18 (08-20-24 @ 23:57) (18 - 19)  SpO2: 93% (08-20-24 @ 23:57) (86% - 96%)    GENERAL: patient appears well, no acute distress, appropriate, pleasant  EYES: sclera clear, no exudates  ENMT: oropharynx clear without erythema, no exudates, moist mucous membranes  NECK: supple, soft, no thyromegaly noted  LUNGS: good air entry bilaterally, clear to auscultation, symmetric breath sounds, no wheezing or rhonchi appreciated  HEART: soft S1/S2, regular rate and rhythm, no murmurs noted, no lower extremity edema  GASTROINTESTINAL: abdomen is soft, nontender, nondistended, normoactive bowel sounds, no palpable masses  INTEGUMENT: good skin turgor, warm skin, appears well perfused  MUSCULOSKELETAL: no clubbing or cyanosis, no obvious deformity  NEUROLOGIC: awake, alert, oriented x3, good muscle tone in 4 extremities, no obvious sensory deficits  PSYCHIATRIC: mood is good, affect is congruent, linear and logical thought process  HEME/LYMPH: no palpable supraclavicular nodules, no obvious ecchymosis or petechiae T(C): 36.4 (08-20-24 @ 23:14), Max: 36.7 (08-20-24 @ 19:24)  HR: 100 (08-20-24 @ 23:57) (83 - 106)  BP: 139/70 (08-20-24 @ 23:57) (123/73 - 152/76)  RR: 18 (08-20-24 @ 23:57) (18 - 19)  SpO2: 93% (08-20-24 @ 23:57) (86% - 96%)    GENERAL: patient appears well, no acute distress, appropriate, pleasant  EYES: sclera clear, no exudates  ENMT: oropharynx clear without erythema, no exudates, moist mucous membranes  NECK: supple, soft, no thyromegaly noted  LUNGS: scatter wheezing   HEART: soft S1/S2, regular rate and rhythm, no murmurs noted, no lower extremity edema  GASTROINTESTINAL: abdomen is soft, nontender, nondistended, normoactive bowel sounds, no palpable masses  INTEGUMENT: good skin turgor, warm skin, appears well perfused  MUSCULOSKELETAL: no clubbing or cyanosis, no obvious deformity  NEUROLOGIC: awake, alert, oriented x3, good muscle tone in 4 extremities, no obvious sensory deficits  PSYCHIATRIC: mood is good, affect is congruent, linear and logical thought process  HEME/LYMPH: no palpable supraclavicular nodules, no obvious ecchymosis or petechiae

## 2024-08-21 NOTE — CHART NOTE - NSCHARTNOTEFT_GEN_A_CORE
PULM brief note:     62 y/o F, active smoker,  schizoaffective , HTN, ?copd, present with "panic attack"  found to be wheezing bilaterally, in respiratory distress.  IMproves with nebs and steroid.   Pulmonary is consulted for mild hypoxia.   UPon evaluating the patient, she saturates 94% on room air, able to ambulate around the ED, and make phone call and speak full sentences.   chest xray negative  cta chest negative for PE  pH 7.5, co2 35, po2 66 on room air     Impression  #mild COPD exacerbation, improving  #acute hypoxic respiratory failure, improving  #active smoker, > 50 pack year     - can transition to prednisone 40 mg daily tomorrow, and for total of 5 days of steroid  - start azithromycin for antiinflammatory   - continue nebs  - can transition to symbicort and spiriva upon discharge, and PRN albuterol MDI  - smoking cessation encouraged  - patient interests in wellbutrin   - patient requests trazodone and prn xanax   - will need to follow up with pulmonary as outpatient  - pulm will follow
Pt exmained in CDU  Feels better but remains on O2 @ 2 L  Planned for dsc home in am IF continues to remain stable  Will check O2 sats for home O2 eval in am prior todsc

## 2024-08-21 NOTE — CONSULT NOTE ADULT - ASSESSMENT
62 y/o F, active smoker,  schizoaffective , HTN, ?copd, present with "panic attack"  found to be wheezing bilaterally, in respiratory distress.  IMproves with nebs and steroid.   Pulmonary is consulted for mild hypoxia.   UPon evaluating the patient, she saturates 94% on room air, able to ambulate around the ED, and make phone call and speak full sentences.   chest xray negative  cta chest negative for PE  pH 7.5, co2 35, po2 66 on room air     Impression  #mild COPD exacerbation, improving  #acute hypoxic respiratory failure, improving  #active smoker, > 50 pack year     - can transition to prednisone 40 mg daily tomorrow, and for total of 5 days of steroid  - start azithromycin for antiinflammatory   - continue nebs  - can transition to symbicort and spiriva upon discharge, and PRN albuterol MDI  - smoking cessation encouraged  - patient interests in wellbutrin   - will need to follow up with pulmonary as outpatient  - pulm will follow.   64 y/o F, active smoker,  schizoaffective , HTN, ?copd, present with "panic attack"  found to be wheezing bilaterally, in respiratory distress.  IMproves with nebs and steroid.   Pulmonary is consulted for mild hypoxia.   UPon evaluating the patient, she saturates 94% on room air, able to ambulate around the ED, and make phone call and speak full sentences.   chest xray negative  cta chest negative for PE  pH 7.5, co2 35, po2 66 on room air     Impression  #mild COPD exacerbation, improving  #acute hypoxic respiratory failure, improving  #active smoker, > 50 pack year     - prednisone 40 mg daily for 5 days   - brief course of azitihromycin   - continue nebs  - can transition to symbicort and spiriva upon discharge, and PRN albuterol MDI  - supplemental oxygen as needed  - smoking cessation encouraged  - recommend Chantix when follow up with outpatient pulm   - outpatient appointment made for Dr. Rincon  on 9/6/24  at 9 am.   pulmonary office @ [address] [place] [address], phone number  [phone]    pulm will sign off, please call for any question

## 2024-08-21 NOTE — ED CDU PROVIDER DISPOSITION NOTE - CLINICAL COURSE
64 y/o Female with PMH of HTN, COPD, schizoaffective disorder, Bipolar presents c/o sob. Pt states she has been having panic attacks and subsequently feeling sob. Used inhaler with little relief. + wheezing on exam initially , spo2>90 at rest on room air with decrease to 88 with exertion yesterady.  She denies cough, congestion, fever/chills, n/v current chest pain, sick contacts. denies SI/HI. + daily smoker  has been seen by [facility] in the past, states he follows with Pulmonologist in [place] every 6 months ago . pt is kept on observation for  exacerbation tx with Duoneb, steroid and Pulm consultation . pulmonology recommended Zithromax and tomorrow change to PO prednisone and evaluation. received the call from RN that pt at the rest has O2 sat of 86% that O2 added and request for another nebulizer . pt never been on O2 concentrator at home . on exam pt has faint exp wheezing as well . will admit the pt for further evaluation

## 2024-08-21 NOTE — CONSULT NOTE ADULT - SUBJECTIVE AND OBJECTIVE BOX
Patient is a 63y old  Female who presents with a chief complaint of acute hypoxic respiratory failure   COPD exacerbation (21 Aug 2024 02:48)    Interval history:   8/21/24 - pt placed on 2L nasal canula, patient reports feeling better.  WBC went up to 29        BRIEF HOSPITAL COURSE:  62 y/o F, active smoker,  schizoaffective , HTN, ?copd, present with "panic attack"  found to be wheezing bilaterally, in respiratory distress.  IMproves with nebs and steroid.   Pulmonary is consulted for mild hypoxia.   UPon evaluating the patient, she saturates 94% on room air, able to ambulate around the ED, and make phone call and speak full sentences.   chest xray negative  cta chest negative for PE  pH 7.5, co2 35, po2 66 on room air       PAST MEDICAL & SURGICAL HISTORY:  COPD (chronic obstructive pulmonary disease)      PTSD (post-traumatic stress disorder)      Bipolar 1 disorder      No significant past surgical history        Allergies    Advil (Angioedema)    Intolerances    Paper tray ONLY, no metal utesils or cans please (Unknown)    FAMILY HISTORY:  No pertinent family history in first degree relatives        Family history otherwise noncontributory.    Social History:  Review of Systems:      ALL OTHER REVIEW OF SYSTEMS EXCEPT PER HPI NEGATIVE.      Medications:  azithromycin   Tablet 250 milliGRAM(s) Oral daily      albuterol/ipratropium for Nebulization 3 milliLiter(s) Nebulizer every 6 hours    acetaminophen     Tablet .. 650 milliGRAM(s) Oral every 6 hours PRN  ALPRAZolam 0.5 milliGRAM(s) Oral at bedtime PRN  FLUoxetine 20 milliGRAM(s) Oral daily  melatonin 3 milliGRAM(s) Oral at bedtime PRN  ondansetron Injectable 4 milliGRAM(s) IV Push every 8 hours PRN  traZODone 50 milliGRAM(s) Oral at bedtime      enoxaparin Injectable 40 milliGRAM(s) SubCutaneous every 24 hours    aluminum hydroxide/magnesium hydroxide/simethicone Suspension 30 milliLiter(s) Oral every 4 hours PRN      predniSONE   Tablet 40 milliGRAM(s) Oral daily      Vital Signs Last 24 Hrs  T(C): 36.7 (21 Aug 2024 11:58), Max: 36.7 (20 Aug 2024 19:24)  T(F): 98.1 (21 Aug 2024 11:58), Max: 98.1 (21 Aug 2024 03:31)  HR: 82 (21 Aug 2024 11:58) (78 - 106)  BP: 117/64 (21 Aug 2024 11:58) (117/64 - 152/76)  BP(mean): 81 (21 Aug 2024 11:58) (81 - 93)  RR: 20 (21 Aug 2024 11:58) (18 - 20)  SpO2: 97% (21 Aug 2024 11:58) (86% - 97%)    Parameters below as of 21 Aug 2024 11:58  Patient On (Oxygen Delivery Method): nasal cannula  O2 Flow (L/min): 3      ABG - ( 20 Aug 2024 12:18 )  pH, Arterial: 7.500 pH, Blood: x     /  pCO2: 35    /  pO2: 66    / HCO3: 27    / Base Excess: 4.1   /  SaO2: 95.9                I&O's Detail        LABS:                        14.5   29.79 )-----------( 291      ( 21 Aug 2024 07:15 )             43.4     08-21    139  |  102  |  16.3  ----------------------------<  145<H>  4.4   |  27.0  |  0.63    Ca    9.2      21 Aug 2024 07:15    TPro  7.3  /  Alb  4.1  /  TBili  0.3<L>  /  DBili  x   /  AST  25  /  ALT  23  /  AlkPhos  93  08-19          CAPILLARY BLOOD GLUCOSE          Urinalysis Basic - ( 21 Aug 2024 07:15 )    Color: x / Appearance: x / SG: x / pH: x  Gluc: 145 mg/dL / Ketone: x  / Bili: x / Urobili: x   Blood: x / Protein: x / Nitrite: x   Leuk Esterase: x / RBC: x / WBC x   Sq Epi: x / Non Sq Epi: x / Bacteria: x      CULTURES:      Physical Examination:  GENERAL: In NAD   HEENT: NC/AT  NECK: Supple, trachea midline  PULM: CTA b/l   CVS: +S1, S2, RRR  ABD: Soft, non-tender  EXTREMITIES: No pedal edema B/L  SKIN: No open wounds  NEURO: Grossly non-focal    DEVICES:     RADIOLOGY:   ct chest   IMPRESSION:  No main or lobar pulmonary embolus. The segmental and subsegmental   branches of the pulmonary arteries are not evaluated secondary to motion.

## 2024-08-22 ENCOUNTER — TRANSCRIPTION ENCOUNTER (OUTPATIENT)
Age: 64
End: 2024-08-22

## 2024-08-22 VITALS
RESPIRATION RATE: 18 BRPM | HEART RATE: 75 BPM | TEMPERATURE: 98 F | SYSTOLIC BLOOD PRESSURE: 117 MMHG | DIASTOLIC BLOOD PRESSURE: 72 MMHG | OXYGEN SATURATION: 94 %

## 2024-08-22 PROCEDURE — 36415 COLL VENOUS BLD VENIPUNCTURE: CPT

## 2024-08-22 PROCEDURE — 99239 HOSP IP/OBS DSCHRG MGMT >30: CPT

## 2024-08-22 PROCEDURE — 93005 ELECTROCARDIOGRAM TRACING: CPT

## 2024-08-22 PROCEDURE — 71275 CT ANGIOGRAPHY CHEST: CPT | Mod: MC

## 2024-08-22 PROCEDURE — 80053 COMPREHEN METABOLIC PANEL: CPT

## 2024-08-22 PROCEDURE — 94640 AIRWAY INHALATION TREATMENT: CPT

## 2024-08-22 PROCEDURE — 96375 TX/PRO/DX INJ NEW DRUG ADDON: CPT

## 2024-08-22 PROCEDURE — 96376 TX/PRO/DX INJ SAME DRUG ADON: CPT

## 2024-08-22 PROCEDURE — 36600 WITHDRAWAL OF ARTERIAL BLOOD: CPT

## 2024-08-22 PROCEDURE — 80048 BASIC METABOLIC PNL TOTAL CA: CPT

## 2024-08-22 PROCEDURE — G0378: CPT

## 2024-08-22 PROCEDURE — 87637 SARSCOV2&INF A&B&RSV AMP PRB: CPT

## 2024-08-22 PROCEDURE — 84484 ASSAY OF TROPONIN QUANT: CPT

## 2024-08-22 PROCEDURE — 83880 ASSAY OF NATRIURETIC PEPTIDE: CPT

## 2024-08-22 PROCEDURE — 71045 X-RAY EXAM CHEST 1 VIEW: CPT

## 2024-08-22 PROCEDURE — 85027 COMPLETE CBC AUTOMATED: CPT

## 2024-08-22 PROCEDURE — 99285 EMERGENCY DEPT VISIT HI MDM: CPT

## 2024-08-22 PROCEDURE — 96374 THER/PROPH/DIAG INJ IV PUSH: CPT

## 2024-08-22 PROCEDURE — 85025 COMPLETE CBC W/AUTO DIFF WBC: CPT

## 2024-08-22 PROCEDURE — 82803 BLOOD GASES ANY COMBINATION: CPT

## 2024-08-22 RX ORDER — AZITHROMYCIN 500 MG/1
1 TABLET, FILM COATED ORAL
Qty: 1 | Refills: 0
Start: 2024-08-22 | End: 2024-08-22

## 2024-08-22 RX ORDER — IPRATROPIUM BROMIDE AND ALBUTEROL SULFATE .5; 3 MG/3ML; MG/3ML
3 SOLUTION RESPIRATORY (INHALATION)
Qty: 360 | Refills: 0
Start: 2024-08-22 | End: 2024-09-20

## 2024-08-22 RX ORDER — BUDESONIDE AND FORMOTEROL FUMARATE 80; 4.5 UG/1; UG/1
2 AEROSOL, METERED RESPIRATORY (INHALATION)
Qty: 1 | Refills: 0
Start: 2024-08-22 | End: 2024-09-20

## 2024-08-22 RX ORDER — TIOTROPIUM BROMIDE INHALATION SPRAY 3.12 UG/1
2 SPRAY, METERED RESPIRATORY (INHALATION)
Qty: 1 | Refills: 0
Start: 2024-08-22 | End: 2024-09-20

## 2024-08-22 RX ORDER — PREDNISONE 10 MG
2 TABLET, DOSE PACK ORAL
Qty: 6 | Refills: 0
Start: 2024-08-22 | End: 2024-08-24

## 2024-08-22 RX ADMIN — AZITHROMYCIN 250 MILLIGRAM(S): 500 TABLET, FILM COATED ORAL at 11:38

## 2024-08-22 RX ADMIN — IPRATROPIUM BROMIDE AND ALBUTEROL SULFATE 3 MILLILITER(S): .5; 3 SOLUTION RESPIRATORY (INHALATION) at 08:16

## 2024-08-22 RX ADMIN — Medication 0.5 MILLIGRAM(S): at 11:44

## 2024-08-22 RX ADMIN — Medication 20 MILLIGRAM(S): at 11:38

## 2024-08-22 RX ADMIN — ENOXAPARIN SODIUM 40 MILLIGRAM(S): 100 INJECTION SUBCUTANEOUS at 05:33

## 2024-08-22 RX ADMIN — Medication 40 MILLIGRAM(S): at 05:34

## 2024-08-22 NOTE — DISCHARGE NOTE PROVIDER - NSDCFUSCHEDAPPT_GEN_ALL_CORE_FT
Ruslan Rincon  [facility]  PULED [address]  Scheduled Appointment: 09/06/2024

## 2024-08-22 NOTE — DISCHARGE NOTE PROVIDER - NSDCCPCAREPLAN_GEN_ALL_CORE_FT
PRINCIPAL DISCHARGE DIAGNOSIS  Diagnosis: COPD exacerbation  Assessment and Plan of Treatment: Strongly recommend complete smoking cessation      SECONDARY DISCHARGE DIAGNOSES  Diagnosis: Hypoxia  Assessment and Plan of Treatment:

## 2024-08-22 NOTE — DISCHARGE NOTE PROVIDER - ATTENDING DISCHARGE PHYSICAL EXAMINATION:
Gen : Non toxic appearing, comfortable, NAD  HEENT : NCAT, MMM, No cervical lymphadenopathy, no JVD  CVS : S1S2, regular rate and rhythm, no murmurs  Resp : CTA b/l, no rhonchi or wheezes appreciated   Abd : Soft, non distended, non tender, BS +ve   MSK : No joint swelling or tenderness, no digital clubbing, no distal cyanosis  Neuro : CN II-XII intact, no focal motor or sensory deficits   Psych : Pleasant, no anxiety, normal affect Gen : Non toxic appearing, comfortable, NAD  HEENT : NCAT, MMM, No cervical lymphadenopathy, no JVD  CVS : S1S2, regular rate and rhythm, no murmurs  Resp : CTA b/l, no rhonchi or wheezes appreciated   Abd : Soft, non distended, non tender, BS +ve   MSK : No joint swelling or tenderness, no digital clubbing, no distal cyanosis  Neuro : CN II-XII intact, no focal motor or sensory deficits   Psych : Pleasant, no anxiety, normal affect.

## 2024-08-22 NOTE — DISCHARGE NOTE PROVIDER - NSDCMRMEDTOKEN_GEN_ALL_CORE_FT
albuterol 2.5 mg/3 mL (0.083%) inhalation solution: 3 milliliter(s) by nebulizer 4 times a day  albuterol 90 mcg/inh inhalation aerosol: 2 puff(s) inhaled every 4 hours as needed for  shortness of breath and/or wheezing  azithromycin 250 mg oral tablet: 1 tab(s) orally once a day Take on 8/23/24  FLUoxetine 20 mg oral capsule: 1 cap(s) orally once a day  ipratropium-albuterol 0.5 mg-2.5 mg/3 mL inhalation solution: 3 milliliter(s) inhaled every 6 hours  predniSONE 20 mg oral tablet: 2 tab(s) orally once a day Take once daily starting from 8/23/24  Xanax 0.5 mg oral tablet: 1 tab(s) orally 2 times a day MDD: 2

## 2024-08-22 NOTE — DISCHARGE NOTE PROVIDER - HOSPITAL COURSE
64yo F active smoker with of schizoaffective, prior suicidal attempt, COPD, HTN presented to ED for "panic attack". Found to be wheezing bilaterally in the ED with respiratory distress. Admitted for acute hypoxic resp failure 2/2 COPD. Evaluated by Pulm. Started on steroids with nebulizers. Marked improvement. Pending home O2 eval is medically stable for dsc home.

## 2024-08-22 NOTE — DISCHARGE NOTE NURSING/CASE MANAGEMENT/SOCIAL WORK - PATIENT PORTAL LINK FT
You can access the FollowMyHealth Patient Portal offered by [facility] by registering at the following website: [email or web address]. By joining [facility]’s FollowMyHealth portal, you will also be able to view your health information using other applications (apps) compatible with our system.

## 2024-09-06 ENCOUNTER — APPOINTMENT (OUTPATIENT)
Dept: PULMONOLOGY | Facility: CLINIC | Age: 64
End: 2024-09-06
Payer: MEDICARE

## 2024-09-06 VITALS
HEIGHT: 67 IN | DIASTOLIC BLOOD PRESSURE: 75 MMHG | OXYGEN SATURATION: 96 % | RESPIRATION RATE: 16 BRPM | WEIGHT: 197 LBS | SYSTOLIC BLOOD PRESSURE: 118 MMHG | BODY MASS INDEX: 30.92 KG/M2 | HEART RATE: 109 BPM

## 2024-09-06 DIAGNOSIS — I50.9 HEART FAILURE, UNSPECIFIED: ICD-10-CM

## 2024-09-06 DIAGNOSIS — J44.9 CHRONIC OBSTRUCTIVE PULMONARY DISEASE, UNSPECIFIED: ICD-10-CM

## 2024-09-06 PROCEDURE — 94010 BREATHING CAPACITY TEST: CPT

## 2024-09-06 PROCEDURE — 99214 OFFICE O/P EST MOD 30 MIN: CPT | Mod: 25

## 2024-09-06 RX ORDER — FLUOXETINE HCL 10 MG
TABLET ORAL
Refills: 0 | Status: ACTIVE | COMMUNITY

## 2024-09-06 RX ORDER — RISPERIDONE 2 MG/1
2 TABLET, FILM COATED ORAL
Refills: 0 | Status: ACTIVE | COMMUNITY
Start: 2024-09-06

## 2024-09-06 RX ORDER — IPRATROPIUM BROMIDE AND ALBUTEROL SULFATE .5; 3 MG/3ML; MG/3ML
SOLUTION RESPIRATORY (INHALATION)
Refills: 0 | Status: ACTIVE | COMMUNITY

## 2024-09-06 RX ORDER — TRAZODONE HYDROCHLORIDE 300 MG/1
TABLET ORAL
Refills: 0 | Status: ACTIVE | COMMUNITY

## 2024-09-06 RX ORDER — IPRATROPIUM BROMIDE 17 UG/1
17 AEROSOL, METERED RESPIRATORY (INHALATION) 4 TIMES DAILY
Qty: 1 | Refills: 3 | Status: ACTIVE | COMMUNITY
Start: 2024-09-06 | End: 1900-01-01

## 2024-09-06 NOTE — ASSESSMENT
[FreeTextEntry1] : Ms. Juarez is a 63 year old smoker who presents in follow-up for a recent hospitalization for a COPD exacerbation.  I had offered to change her to an inhaler regimen, but she notes that she feels like she does better with nebulizers, so I suggested trialing at least 3 times daily of the DuoNeb and see if that offers any benefit. However, I'll prescribe an Atrovent inhaler to use as needed. I also offered pulmonary rehab which she wasn't interested in, noting that she will try to exercise on her own. We'll talk about starting low dose CT scans at our next visit, though she did not have any nodules on her CT-PA in August.  Otherwise, interestingly, she also had an echo while in the hospital for an intentional overdose of diphenhydramine in a suicide attempt this past June which was most notable for an EF of ~30%. While the diphenhydramine was probably not the cause of the depressed EF, will obtain a repeat echo and pending the results, will likely refer her to be seen by a cardiologist as this certainly could be contributing to her shortness of breath.

## 2024-09-06 NOTE — REVIEW OF SYSTEMS
[Cough] : cough [Sputum] : sputum [Dyspnea] : dyspnea [SOB on Exertion] : sob on exertion [Fever] : no fever [Chills] : no chills [Chest Discomfort] : no chest discomfort [Orthopnea] : no orthopnea [GERD] : no gerd [Diarrhea] : no diarrhea [Arthralgias] : no arthralgias [Myalgias] : no myalgias [Anemia] : no anemia [Headache] : no headache [Dizziness] : no dizziness [Depression] : no depression

## 2024-11-18 DIAGNOSIS — M25.562 PAIN IN LEFT KNEE: ICD-10-CM

## 2024-11-19 ENCOUNTER — NON-APPOINTMENT (OUTPATIENT)
Age: 64
End: 2024-11-19

## 2024-11-19 ENCOUNTER — APPOINTMENT (OUTPATIENT)
Dept: ORTHOPEDIC SURGERY | Facility: CLINIC | Age: 64
End: 2024-11-19
Payer: MEDICARE

## 2024-11-19 VITALS
DIASTOLIC BLOOD PRESSURE: 76 MMHG | BODY MASS INDEX: 30.92 KG/M2 | WEIGHT: 197 LBS | HEART RATE: 118 BPM | SYSTOLIC BLOOD PRESSURE: 120 MMHG | HEIGHT: 67 IN

## 2024-11-19 DIAGNOSIS — M17.12 UNILATERAL PRIMARY OSTEOARTHRITIS, LEFT KNEE: ICD-10-CM

## 2024-11-19 PROCEDURE — 73564 X-RAY EXAM KNEE 4 OR MORE: CPT | Mod: LT

## 2024-11-19 PROCEDURE — 99205 OFFICE O/P NEW HI 60 MIN: CPT

## 2024-12-09 ENCOUNTER — APPOINTMENT (OUTPATIENT)
Dept: PULMONOLOGY | Facility: CLINIC | Age: 64
End: 2024-12-09
Payer: MEDICARE

## 2024-12-09 VITALS
OXYGEN SATURATION: 92 % | SYSTOLIC BLOOD PRESSURE: 126 MMHG | DIASTOLIC BLOOD PRESSURE: 68 MMHG | WEIGHT: 198 LBS | BODY MASS INDEX: 31.08 KG/M2 | HEIGHT: 67 IN | HEART RATE: 93 BPM | RESPIRATION RATE: 16 BRPM

## 2024-12-09 PROCEDURE — G2211 COMPLEX E/M VISIT ADD ON: CPT

## 2024-12-09 PROCEDURE — 99213 OFFICE O/P EST LOW 20 MIN: CPT

## 2024-12-09 RX ORDER — BUDESONIDE AND FORMOTEROL FUMARATE DIHYDRATE 80; 4.5 UG/1; UG/1
80-4.5 AEROSOL RESPIRATORY (INHALATION)
Qty: 10 | Refills: 0 | Status: DISCONTINUED | COMMUNITY
Start: 2024-07-01 | End: 2024-12-09

## 2024-12-09 RX ORDER — NICOTINE 21 MG/24HR
14 PATCH, TRANSDERMAL 24 HOURS TRANSDERMAL DAILY
Qty: 30 | Refills: 3 | Status: ACTIVE | COMMUNITY
Start: 2024-12-09 | End: 1900-01-01

## 2024-12-09 RX ORDER — IPRATROPIUM BROMIDE 17 UG/1
17 AEROSOL, METERED RESPIRATORY (INHALATION) 4 TIMES DAILY
Qty: 1 | Refills: 3 | Status: ACTIVE | COMMUNITY
Start: 2024-12-09 | End: 1900-01-01

## 2024-12-09 RX ORDER — PANTOPRAZOLE 40 MG/1
40 TABLET, DELAYED RELEASE ORAL
Qty: 30 | Refills: 0 | Status: ACTIVE | COMMUNITY
Start: 2024-07-01

## 2024-12-09 RX ORDER — ALBUTEROL SULFATE 90 UG/1
108 (90 BASE) INHALANT RESPIRATORY (INHALATION)
Qty: 8 | Refills: 0 | Status: ACTIVE | COMMUNITY
Start: 2024-07-01

## 2024-12-09 RX ORDER — NICOTINE POLACRILEX 2 MG/1
2 LOZENGE ORAL
Qty: 90 | Refills: 3 | Status: ACTIVE | COMMUNITY
Start: 2024-12-09 | End: 1900-01-01

## 2024-12-09 RX ORDER — FLUOXETINE HYDROCHLORIDE 20 MG/1
20 CAPSULE ORAL
Qty: 30 | Refills: 0 | Status: ACTIVE | COMMUNITY
Start: 2024-10-31

## 2025-01-07 ENCOUNTER — APPOINTMENT (OUTPATIENT)
Dept: CARDIOLOGY | Facility: CLINIC | Age: 65
End: 2025-01-07

## 2025-01-10 ENCOUNTER — APPOINTMENT (OUTPATIENT)
Dept: PULMONOLOGY | Facility: CLINIC | Age: 65
End: 2025-01-10

## 2025-02-06 ENCOUNTER — APPOINTMENT (OUTPATIENT)
Dept: ORTHOPEDIC SURGERY | Facility: HOSPITAL | Age: 65
End: 2025-02-06

## 2025-02-27 ENCOUNTER — APPOINTMENT (OUTPATIENT)
Dept: ORTHOPEDIC SURGERY | Facility: CLINIC | Age: 65
End: 2025-02-27

## 2025-03-20 ENCOUNTER — APPOINTMENT (OUTPATIENT)
Dept: ORTHOPEDIC SURGERY | Facility: CLINIC | Age: 65
End: 2025-03-20

## 2025-03-31 NOTE — HISTORY OF PRESENT ILLNESS
[facility] HOSPITALIST PROGRESS NOTE    Subjective     History:   Pradeep Donald is a 76 y.o. male admitted on 3/29/2025 secondary to Metastatic colon cancer to liver     Procedures: None    History taken from: patient, chart, and RN.      Objective     Vital Signs  Temp:  [97.5 °F (36.4 °C)-98.5 °F (36.9 °C)] 97.5 °F (36.4 °C)  Heart Rate:  [60-75] 75  Resp:  [16-22] 20  BP: ()/(46-63) 90/46    Intake/Output Summary (Last 24 hours) at 3/30/2025 2103  Last data filed at 3/30/2025 1726  Gross per 24 hour   Intake 1140 ml   Output 1290 ml   Net -150 ml         Physical Exam:  General alert oriented x 3 cachectic in mild distress.    Head atraumatic normocephalic.    Neck supple JVD supple.    Cardiovascular regular rate and rhythm with no murmurs.    Respiratory clear with clear breath sounds bilaterally no wheezing and no crackles.    GI left lower quadrant tenderness, not distended with normal bowel sounds.    Extremities pitting edema and swelling in the left lower extremity.    Skin no jaundice and no erythema    Results Review:    Results from last 7 days   Lab Units 03/30/25  0150 03/29/25 1944 03/29/25 0434   WBC 10*3/mm3 15.11* 12.56* 12.85*   HEMOGLOBIN g/dL 7.9* 7.9* 8.5*   PLATELETS 10*3/mm3 340 333 309     Results from last 7 days   Lab Units 03/29/25  0434   SODIUM mmol/L 133*   POTASSIUM mmol/L 4.1   CHLORIDE mmol/L 101   CO2 mmol/L 22.2   BUN mg/dL 21   CREATININE mg/dL 1.14   CALCIUM mg/dL 8.7   GLUCOSE mg/dL 106*     Results from last 7 days   Lab Units 03/29/25  0434   BILIRUBIN mg/dL 0.4   ALK PHOS U/L 316*   AST (SGOT) U/L 20   ALT (SGPT) U/L 11     Results from last 7 days   Lab Units 03/30/25  0150 03/29/25 1944 03/29/25 0434   MAGNESIUM mg/dL 2.1 2.0 2.2     Results from last 7 days   Lab Units 03/29/25  1944   INR  1.24*     Results from last 7 days   Lab Units 03/29/25  0636 03/29/25  0434   CK TOTAL U/L  --  25   HSTROP T ng/L 41* 44*       Imaging Results (Last 24  Hours)       ** No results found for the last 24 hours. **              Medications:  cefepime, 1,000 mg, Intravenous, Q12H  ferric gluconate, 125 mg, Intravenous, Daily  heparin (porcine), 5,000 Units, Subcutaneous, Q8H  senna-docusate sodium, 2 tablet, Oral, BID   And  polyethylene glycol, 17 g, Oral, BID  sodium chloride, 10 mL, Intravenous, Q12H               Assessment & Plan   - New diagnosis colorectal cancer  - Abscess or necrotic mass in the right perineum measured 3.3 x 3.8 cm  - Anemia from bleeding per rectum  -Iron deficiency anemia  - blood per rectum   - Leukocytosis  - Acute left lower extremity  DVT  - Elevated lactic acid from cancer  - Failure to thrive, anorexia and weight loss  - Non-STEMI     Patient looks very weak and lost significant weight.  I had long discussion with his sister at bedside.   Admit to [facility].    IV fluid and keep on clear liquid diet.  Appreciate surgery input. Started on heparin for DVT proph.  CT angiogram pending.  Consulted hematology/oncology for metastatic colon cancer and also DVT treatment options.  Follow-up cultures. Continue antibiotic empiric treatment with cefepime.     Stress ulcer prophylaxis  VTE prophylaxis     CODE STATUS: Full code    3/30: Found to have urinary retention and and failed hold Walsh catheter insertion attempts on the floor patient went to OR and surgeon was able to place Walsh catheter.  Continue Walhs catheter and strict intake and output.  Started on Ferrlecit IV.  Appreciate surgery and oncology input.    Disposition pending clinical improvement    Jayden Barney MD  03/30/25  21:03 EDT     [TextBox_4] : Ms. Juarez is a 63 year old smoker who presents in follow-up for a recent hospitalization.  She presented to [place] on 8/20/2024 with a feeling of having a "panic attack," but was found to be wheezing and in respiratory distress, for which she was treated as a COPD exacerbation with nebulizers, steroids, and antibiotics. She went home the following day to complete a burst of steroids.  Today, she notes doing a little better than when in the hospital but does have chronic shortness of breath. She states that she would be able to walk about [facility] around the building and then would need to stop, and is short of breath after 1 flight of stairs, bending over, getting dressed, showering, and talking on the phone. At the moment, all she is using are nebulized solutions twice daily. She does have a chronic cough, which is worse in the mornings, and sometimes brings up a clear mucus. She hasn't been hospitalized for her breathing in the past, but has had issues with bronchitis requiring antibiotics in the past.   In terms of her smoking history, she currently is smoking 10-15 cigarettes a day, started smoking around 10 years of age, and was around a pack per day smoker at her height. She worked as a  in the past and did have some secondhand smoke exposure when smoking was legal in restaurants.  Data: Echo 6/13/2024: Left ventricular cavity is normal in size. Left ventricular systolic function is moderately to severely decreased with an ejection fraction of 32 % by Randle's method of disks with an ejection fraction visually estimated at 30 to 35 %. Global left ventricular hypokinesis. There is mild (grade 1) left ventricular diastolic dysfunction. Normal right ventricular cavity size and normal systolic function. The left atrium is normal in size. The right atrium is normal in size. Mild mitral regurgitation. Mitral inflow is A-wave dominant. Aortic valve was not well visualized. Pulmonic valve was not well visualized. Structurally normal mitral valve with normal leaflet excursion. No pericardial effusion seen. Structurally normal tricuspid valve with normal leaflet excursion. Normal pulmonary artery pressure.  CT-PA 8/20/2024: Patent central airways. Clear lungs. No pleural effusions or pneumothorax. No main or lobar pulmonary embolus. The segmental and subsegmental branches of the pulmonary arteries are not evaluated secondary to motion.  Spirometry 9/6/2024: FEV1 1.46/57%, FVC 2.14/66%, ratio 86.

## 2025-04-15 NOTE — ED PROVIDER NOTE - GASTROINTESTINAL, MLM
large anterior perforation in the right ear repaired with periosteal graft, large left TM perf not repaired Abdomen soft, non-tender, no guarding.

## 2025-04-30 ENCOUNTER — APPOINTMENT (OUTPATIENT)
Dept: ORTHOPEDIC SURGERY | Facility: CLINIC | Age: 65
End: 2025-04-30

## 2025-05-15 ENCOUNTER — APPOINTMENT (OUTPATIENT)
Dept: ORTHOPEDIC SURGERY | Facility: CLINIC | Age: 65
End: 2025-05-15
Payer: MEDICARE

## 2025-05-15 VITALS
HEIGHT: 67 IN | SYSTOLIC BLOOD PRESSURE: 163 MMHG | HEART RATE: 109 BPM | DIASTOLIC BLOOD PRESSURE: 88 MMHG | WEIGHT: 198 LBS | BODY MASS INDEX: 31.08 KG/M2

## 2025-05-15 DIAGNOSIS — M17.12 UNILATERAL PRIMARY OSTEOARTHRITIS, LEFT KNEE: ICD-10-CM

## 2025-05-15 PROCEDURE — G2211 COMPLEX E/M VISIT ADD ON: CPT

## 2025-05-15 PROCEDURE — 99215 OFFICE O/P EST HI 40 MIN: CPT

## 2025-05-16 ENCOUNTER — NON-APPOINTMENT (OUTPATIENT)
Age: 65
End: 2025-05-16

## 2025-06-09 ENCOUNTER — APPOINTMENT (OUTPATIENT)
Dept: CT IMAGING | Facility: CLINIC | Age: 65
End: 2025-06-09
Payer: MEDICARE

## 2025-06-09 ENCOUNTER — OUTPATIENT (OUTPATIENT)
Dept: OUTPATIENT SERVICES | Facility: HOSPITAL | Age: 65
LOS: 1 days | End: 2025-06-09
Payer: MEDICARE

## 2025-06-09 DIAGNOSIS — M17.12 UNILATERAL PRIMARY OSTEOARTHRITIS, LEFT KNEE: ICD-10-CM

## 2025-06-09 PROCEDURE — 73700 CT LOWER EXTREMITY W/O DYE: CPT | Mod: 26,LT

## 2025-06-09 PROCEDURE — 73700 CT LOWER EXTREMITY W/O DYE: CPT

## 2025-06-13 ENCOUNTER — OUTPATIENT (OUTPATIENT)
Dept: OUTPATIENT SERVICES | Facility: HOSPITAL | Age: 65
LOS: 1 days | End: 2025-06-13
Payer: MEDICARE

## 2025-06-13 ENCOUNTER — RESULT REVIEW (OUTPATIENT)
Age: 65
End: 2025-06-13

## 2025-06-13 VITALS
OXYGEN SATURATION: 98 % | SYSTOLIC BLOOD PRESSURE: 115 MMHG | DIASTOLIC BLOOD PRESSURE: 80 MMHG | HEIGHT: 67 IN | WEIGHT: 187.39 LBS | HEART RATE: 90 BPM | RESPIRATION RATE: 16 BRPM | TEMPERATURE: 97 F

## 2025-06-13 DIAGNOSIS — Z29.9 ENCOUNTER FOR PROPHYLACTIC MEASURES, UNSPECIFIED: ICD-10-CM

## 2025-06-13 DIAGNOSIS — M17.12 UNILATERAL PRIMARY OSTEOARTHRITIS, LEFT KNEE: ICD-10-CM

## 2025-06-13 DIAGNOSIS — Z01.818 ENCOUNTER FOR OTHER PREPROCEDURAL EXAMINATION: ICD-10-CM

## 2025-06-13 DIAGNOSIS — Z98.890 OTHER SPECIFIED POSTPROCEDURAL STATES: Chronic | ICD-10-CM

## 2025-06-13 LAB
A1C WITH ESTIMATED AVERAGE GLUCOSE RESULT: 5.6 % — SIGNIFICANT CHANGE UP (ref 4–5.6)
ANION GAP SERPL CALC-SCNC: 11 MMOL/L — SIGNIFICANT CHANGE UP (ref 5–17)
APTT BLD: 37.1 SEC — HIGH (ref 26.1–36.8)
BASOPHILS # BLD AUTO: 0.07 K/UL — SIGNIFICANT CHANGE UP (ref 0–0.2)
BASOPHILS NFR BLD AUTO: 0.7 % — SIGNIFICANT CHANGE UP (ref 0–2)
BLD GP AB SCN SERPL QL: SIGNIFICANT CHANGE UP
BUN SERPL-MCNC: 13.6 MG/DL — SIGNIFICANT CHANGE UP (ref 8–20)
CALCIUM SERPL-MCNC: 8.9 MG/DL — SIGNIFICANT CHANGE UP (ref 8.4–10.5)
CHLORIDE SERPL-SCNC: 99 MMOL/L — SIGNIFICANT CHANGE UP (ref 96–108)
CO2 SERPL-SCNC: 27 MMOL/L — SIGNIFICANT CHANGE UP (ref 22–29)
CREAT SERPL-MCNC: 0.69 MG/DL — SIGNIFICANT CHANGE UP (ref 0.5–1.3)
EGFR: 97 ML/MIN/1.73M2 — SIGNIFICANT CHANGE UP
EGFR: 97 ML/MIN/1.73M2 — SIGNIFICANT CHANGE UP
EOSINOPHIL # BLD AUTO: 0.06 K/UL — SIGNIFICANT CHANGE UP (ref 0–0.5)
EOSINOPHIL NFR BLD AUTO: 0.6 % — SIGNIFICANT CHANGE UP (ref 0–6)
ESTIMATED AVERAGE GLUCOSE: 114 MG/DL — SIGNIFICANT CHANGE UP (ref 68–114)
GLUCOSE SERPL-MCNC: 96 MG/DL — SIGNIFICANT CHANGE UP (ref 70–99)
HCT VFR BLD CALC: 42.6 % — SIGNIFICANT CHANGE UP (ref 34.5–45)
HGB BLD-MCNC: 13.8 G/DL — SIGNIFICANT CHANGE UP (ref 11.5–15.5)
IMM GRANULOCYTES # BLD AUTO: 0.03 K/UL — SIGNIFICANT CHANGE UP (ref 0–0.07)
IMM GRANULOCYTES NFR BLD AUTO: 0.3 % — SIGNIFICANT CHANGE UP (ref 0–0.9)
INR BLD: 0.91 RATIO — SIGNIFICANT CHANGE UP (ref 0.85–1.16)
LYMPHOCYTES # BLD AUTO: 2.24 K/UL — SIGNIFICANT CHANGE UP (ref 1–3.3)
LYMPHOCYTES NFR BLD AUTO: 23.6 % — SIGNIFICANT CHANGE UP (ref 13–44)
MCHC RBC-ENTMCNC: 28.2 PG — SIGNIFICANT CHANGE UP (ref 27–34)
MCHC RBC-ENTMCNC: 32.4 G/DL — SIGNIFICANT CHANGE UP (ref 32–36)
MCV RBC AUTO: 86.9 FL — SIGNIFICANT CHANGE UP (ref 80–100)
MONOCYTES # BLD AUTO: 0.68 K/UL — SIGNIFICANT CHANGE UP (ref 0–0.9)
MONOCYTES NFR BLD AUTO: 7.2 % — SIGNIFICANT CHANGE UP (ref 2–14)
NEUTROPHILS # BLD AUTO: 6.43 K/UL — SIGNIFICANT CHANGE UP (ref 1.8–7.4)
NEUTROPHILS NFR BLD AUTO: 67.6 % — SIGNIFICANT CHANGE UP (ref 43–77)
NRBC # BLD AUTO: 0 K/UL — SIGNIFICANT CHANGE UP (ref 0–0)
NRBC # FLD: 0 K/UL — SIGNIFICANT CHANGE UP (ref 0–0)
NRBC BLD AUTO-RTO: 0 /100 WBCS — SIGNIFICANT CHANGE UP (ref 0–0)
PLATELET # BLD AUTO: 298 K/UL — SIGNIFICANT CHANGE UP (ref 150–400)
PMV BLD: 11.4 FL — SIGNIFICANT CHANGE UP (ref 7–13)
POTASSIUM SERPL-MCNC: 4.4 MMOL/L — SIGNIFICANT CHANGE UP (ref 3.5–5.3)
POTASSIUM SERPL-SCNC: 4.4 MMOL/L — SIGNIFICANT CHANGE UP (ref 3.5–5.3)
PROTHROM AB SERPL-ACNC: 10.6 SEC — SIGNIFICANT CHANGE UP (ref 9.9–13.4)
RBC # BLD: 4.9 M/UL — SIGNIFICANT CHANGE UP (ref 3.8–5.2)
RBC # FLD: 13.3 % — SIGNIFICANT CHANGE UP (ref 10.3–14.5)
SODIUM SERPL-SCNC: 137 MMOL/L — SIGNIFICANT CHANGE UP (ref 135–145)
WBC # BLD: 9.51 K/UL — SIGNIFICANT CHANGE UP (ref 3.8–10.5)
WBC # FLD AUTO: 9.51 K/UL — SIGNIFICANT CHANGE UP (ref 3.8–10.5)

## 2025-06-13 PROCEDURE — 83036 HEMOGLOBIN GLYCOSYLATED A1C: CPT

## 2025-06-13 PROCEDURE — 85025 COMPLETE CBC W/AUTO DIFF WBC: CPT

## 2025-06-13 PROCEDURE — 80048 BASIC METABOLIC PNL TOTAL CA: CPT

## 2025-06-13 PROCEDURE — 86850 RBC ANTIBODY SCREEN: CPT

## 2025-06-13 PROCEDURE — 71046 X-RAY EXAM CHEST 2 VIEWS: CPT | Mod: 26

## 2025-06-13 PROCEDURE — 36415 COLL VENOUS BLD VENIPUNCTURE: CPT

## 2025-06-13 PROCEDURE — 71046 X-RAY EXAM CHEST 2 VIEWS: CPT

## 2025-06-13 PROCEDURE — 85610 PROTHROMBIN TIME: CPT

## 2025-06-13 PROCEDURE — 86900 BLOOD TYPING SEROLOGIC ABO: CPT

## 2025-06-13 PROCEDURE — 93010 ELECTROCARDIOGRAM REPORT: CPT

## 2025-06-13 PROCEDURE — 86901 BLOOD TYPING SEROLOGIC RH(D): CPT

## 2025-06-13 PROCEDURE — 85730 THROMBOPLASTIN TIME PARTIAL: CPT

## 2025-06-13 PROCEDURE — G0463: CPT

## 2025-06-13 PROCEDURE — 93005 ELECTROCARDIOGRAM TRACING: CPT

## 2025-06-13 NOTE — H&P PST ADULT - NEUROLOGICAL
details… sensation intact/responds to pain/responds to verbal commands/deep reflexes intact/no spontaneous movement

## 2025-06-13 NOTE — H&P PST ADULT - HISTORY OF PRESENT ILLNESS
Pt is a 64 years old female seen today pre-op for left knee total joint replacement DEMETRIO for unilateral primary OA.  Pt is a 64 years old female seen today pre-op for left knee total joint replacement [facility] for unilateral primary OA. Pt reports intermittent left knee pain, and discomfort, pain aggravated with walking, prolonged standing,  unsteady gait, intermittent swelling of the knee, left lower extremity disconformity.   Pt  rate maximum pain as 10/10, at rest 2/10.. Pt medical hx includes COPD, OA, scho Pt is a 64 years old female seen today pre-op for left knee total joint replacement DEMETRIO for unilateral primary OA. Pt reports intermittent left knee pain, and discomfort, pain aggravated with walking, prolonged standing,  unsteady gait, intermittent swelling of the knee, left lower extremity genu varum.  Pt  rate maximum pain as 10/10, at rest 2/10.. Pt medical hx includes COPD, OA, schizophrenia, bipolar disorder, depression, anxiety. Pt scheduled for this surgery on 7/3/25 with Dr. Miller

## 2025-06-13 NOTE — H&P PST ADULT - RESPIRATORY
wheezes no rales/no rhonchi/no respiratory distress/no use of accessory muscles/airway patent/wheezes

## 2025-06-13 NOTE — H&P PST ADULT - NSICDXFAMILYHX_GEN_ALL_CORE_FT
FAMILY HISTORY:  Father  Still living? No  FH: MI (myocardial infarction), Age at diagnosis: 31-40    Mother  Still living? No  FHx: brain cancer, Age at diagnosis: Age Unknown    Sibling  Still living? Yes, Estimated age: Age Unknown  FHx: thyroid disease, Age at diagnosis: Age Unknown

## 2025-06-13 NOTE — H&P PST ADULT - MUSCULOSKELETAL
details… left genu varum/decreased ROM/decreased ROM due to pain/joint swelling/decreased strength/abnormal gait

## 2025-06-13 NOTE — H&P PST ADULT - NSHP PST DIAGEKG REVIEWED YN_GEN_A_CORE
Rx: MVI daily, vitamin C (500mg daily), and zinc sulfate (220mg daily x 10 days) to aid in wound healing.   Provide high protein gelatein BID, Magic cup TID, van pudding BID
Yes

## 2025-06-16 ENCOUNTER — OUTPATIENT (OUTPATIENT)
Dept: OUTPATIENT SERVICES | Facility: HOSPITAL | Age: 65
LOS: 1 days | End: 2025-06-16
Payer: MEDICARE

## 2025-06-16 DIAGNOSIS — Z98.890 OTHER SPECIFIED POSTPROCEDURAL STATES: Chronic | ICD-10-CM

## 2025-06-16 DIAGNOSIS — Z01.812 ENCOUNTER FOR PREPROCEDURAL LABORATORY EXAMINATION: ICD-10-CM

## 2025-06-16 PROBLEM — M17.12 UNILATERAL PRIMARY OSTEOARTHRITIS, LEFT KNEE: Chronic | Status: ACTIVE | Noted: 2025-06-13

## 2025-06-16 PROBLEM — F20.9 SCHIZOPHRENIA, UNSPECIFIED: Chronic | Status: ACTIVE | Noted: 2025-06-13

## 2025-06-16 LAB
MRSA PCR RESULT.: SIGNIFICANT CHANGE UP
S AUREUS DNA NOSE QL NAA+PROBE: SIGNIFICANT CHANGE UP

## 2025-06-16 PROCEDURE — 87641 MR-STAPH DNA AMP PROBE: CPT

## 2025-06-16 PROCEDURE — 87640 STAPH A DNA AMP PROBE: CPT

## 2025-06-18 ENCOUNTER — NON-APPOINTMENT (OUTPATIENT)
Age: 65
End: 2025-06-18

## 2025-06-28 ENCOUNTER — NON-APPOINTMENT (OUTPATIENT)
Age: 65
End: 2025-06-28

## 2025-06-30 ENCOUNTER — APPOINTMENT (OUTPATIENT)
Dept: PULMONOLOGY | Facility: CLINIC | Age: 65
End: 2025-06-30

## 2025-06-30 VITALS
DIASTOLIC BLOOD PRESSURE: 64 MMHG | HEART RATE: 64 BPM | OXYGEN SATURATION: 94 % | SYSTOLIC BLOOD PRESSURE: 134 MMHG | RESPIRATION RATE: 16 BRPM

## 2025-06-30 VITALS — WEIGHT: 187 LBS | HEIGHT: 66 IN | BODY MASS INDEX: 30.05 KG/M2

## 2025-06-30 PROBLEM — Z87.891 PERSONAL HISTORY OF NICOTINE DEPENDENCE: Status: ACTIVE | Noted: 2025-06-30

## 2025-06-30 PROBLEM — F17.200 NICOTINE ADDICTION: Status: ACTIVE | Noted: 2025-06-30

## 2025-06-30 PROCEDURE — 99214 OFFICE O/P EST MOD 30 MIN: CPT | Mod: 25

## 2025-06-30 PROCEDURE — 94010 BREATHING CAPACITY TEST: CPT

## 2025-06-30 PROCEDURE — 99406 BEHAV CHNG SMOKING 3-10 MIN: CPT

## 2025-06-30 RX ORDER — FLUTICASONE FUROATE, UMECLIDINIUM BROMIDE AND VILANTEROL TRIFENATATE 100; 62.5; 25 UG/1; UG/1; UG/1
100-62.5-25 POWDER RESPIRATORY (INHALATION)
Qty: 1 | Refills: 5 | Status: ACTIVE | COMMUNITY
Start: 2025-06-30 | End: 1900-01-01

## 2025-06-30 RX ORDER — NICOTINE 4 MG/1
10 INHALANT RESPIRATORY (INHALATION)
Qty: 1 | Refills: 3 | Status: ACTIVE | COMMUNITY
Start: 2025-06-30 | End: 1900-01-01

## 2025-07-01 NOTE — PROVIDER CONTACT NOTE (OTHER) - SITUATION
Attended joint education session on 5/27/2025 via in person method with opportunity to ask questions. Patient had good understanding of content.  Contact information for follow up questions given.

## 2025-07-02 RX ORDER — POVIDONE-IODINE 7.5 %
1 SOLUTION, NON-ORAL TOPICAL ONCE
Refills: 0 | Status: COMPLETED | OUTPATIENT
Start: 2025-07-03 | End: 2025-07-03

## 2025-07-03 ENCOUNTER — TRANSCRIPTION ENCOUNTER (OUTPATIENT)
Age: 65
End: 2025-07-03

## 2025-07-03 ENCOUNTER — OUTPATIENT (OUTPATIENT)
Dept: INPATIENT UNIT | Facility: HOSPITAL | Age: 65
LOS: 1 days | End: 2025-07-03
Payer: MEDICARE

## 2025-07-03 ENCOUNTER — APPOINTMENT (OUTPATIENT)
Dept: ORTHOPEDIC SURGERY | Facility: HOSPITAL | Age: 65
End: 2025-07-03

## 2025-07-03 ENCOUNTER — RESULT REVIEW (OUTPATIENT)
Age: 65
End: 2025-07-03

## 2025-07-03 VITALS
OXYGEN SATURATION: 96 % | HEART RATE: 93 BPM | WEIGHT: 187.39 LBS | RESPIRATION RATE: 16 BRPM | DIASTOLIC BLOOD PRESSURE: 96 MMHG | TEMPERATURE: 98 F | SYSTOLIC BLOOD PRESSURE: 151 MMHG | HEIGHT: 67 IN

## 2025-07-03 DIAGNOSIS — Z01.818 ENCOUNTER FOR OTHER PREPROCEDURAL EXAMINATION: ICD-10-CM

## 2025-07-03 DIAGNOSIS — Z98.890 OTHER SPECIFIED POSTPROCEDURAL STATES: Chronic | ICD-10-CM

## 2025-07-03 DIAGNOSIS — M17.12 UNILATERAL PRIMARY OSTEOARTHRITIS, LEFT KNEE: ICD-10-CM

## 2025-07-03 LAB — ABO RH CONFIRMATION: SIGNIFICANT CHANGE UP

## 2025-07-03 PROCEDURE — 27447 TOTAL KNEE ARTHROPLASTY: CPT | Mod: LT

## 2025-07-03 PROCEDURE — 27447 TOTAL KNEE ARTHROPLASTY: CPT | Mod: AS,LT

## 2025-07-03 PROCEDURE — 73560 X-RAY EXAM OF KNEE 1 OR 2: CPT

## 2025-07-03 PROCEDURE — 99213 OFFICE O/P EST LOW 20 MIN: CPT

## 2025-07-03 PROCEDURE — 73560 X-RAY EXAM OF KNEE 1 OR 2: CPT | Mod: 26,LT

## 2025-07-03 PROCEDURE — 36415 COLL VENOUS BLD VENIPUNCTURE: CPT

## 2025-07-03 PROCEDURE — 20985 CPTR-ASST DIR MS PX: CPT

## 2025-07-03 DEVICE — INSERT TIB BEARING CS X3 SZ 4 10MM: Type: IMPLANTABLE DEVICE | Site: LEFT | Status: FUNCTIONAL

## 2025-07-03 DEVICE — PATELLA TRIATHLON ASSYM SZ A3X10MM: Type: IMPLANTABLE DEVICE | Site: LEFT | Status: FUNCTIONAL

## 2025-07-03 DEVICE — MAKO BONE PIN 4MM X 80MM: Type: IMPLANTABLE DEVICE | Site: LEFT | Status: FUNCTIONAL

## 2025-07-03 DEVICE — GUIDE PIN/SCREW ORTHO 3.2 X 37MM: Type: IMPLANTABLE DEVICE | Site: LEFT | Status: FUNCTIONAL

## 2025-07-03 DEVICE — COMP FEM CR CMNTLSS BEADED W/ PA SZ 4 LT: Type: IMPLANTABLE DEVICE | Site: LEFT | Status: FUNCTIONAL

## 2025-07-03 DEVICE — BASEPLATE TIB TRIATHLON TRITAN SZ 4: Type: IMPLANTABLE DEVICE | Site: LEFT | Status: FUNCTIONAL

## 2025-07-03 DEVICE — MAKO BONE PIN 4MM X 140MM: Type: IMPLANTABLE DEVICE | Site: LEFT | Status: FUNCTIONAL

## 2025-07-03 RX ORDER — FLUTICASONE FUROATE, UMECLIDINIUM BROMIDE AND VILANTEROL TRIFENATATE 100; 62.5; 25 UG/1; UG/1; UG/1
1 POWDER RESPIRATORY (INHALATION) DAILY
Refills: 0 | Status: DISCONTINUED | OUTPATIENT
Start: 2025-07-03 | End: 2025-07-06

## 2025-07-03 RX ORDER — ACETAMINOPHEN 500 MG/5ML
975 LIQUID (ML) ORAL ONCE
Refills: 0 | Status: COMPLETED | OUTPATIENT
Start: 2025-07-03 | End: 2025-07-03

## 2025-07-03 RX ORDER — TRAZODONE HCL 100 MG
0 TABLET ORAL
Refills: 0 | DISCHARGE

## 2025-07-03 RX ORDER — CEFAZOLIN SODIUM IN 0.9 % NACL 3 G/100 ML
2000 INTRAVENOUS SOLUTION, PIGGYBACK (ML) INTRAVENOUS ONCE
Refills: 0 | Status: DISCONTINUED | OUTPATIENT
Start: 2025-07-03 | End: 2025-07-03

## 2025-07-03 RX ORDER — ONDANSETRON HCL/PF 4 MG/2 ML
4 VIAL (ML) INJECTION EVERY 6 HOURS
Refills: 0 | Status: ACTIVE | OUTPATIENT
Start: 2025-07-03 | End: 2026-06-01

## 2025-07-03 RX ORDER — SODIUM CHLORIDE 9 G/1000ML
1000 INJECTION, SOLUTION INTRAVENOUS
Refills: 0 | Status: DISCONTINUED | OUTPATIENT
Start: 2025-07-03 | End: 2025-07-03

## 2025-07-03 RX ORDER — BISACODYL 5 MG
10 TABLET, DELAYED RELEASE (ENTERIC COATED) ORAL ONCE
Refills: 0 | Status: ACTIVE | OUTPATIENT
Start: 2025-07-05 | End: 2026-06-03

## 2025-07-03 RX ORDER — SENNOSIDES, DOCUSATE SODIUM 8.6; 5 MG/1; MG/1
2 TABLET ORAL
Qty: 30 | Refills: 0
Start: 2025-07-03

## 2025-07-03 RX ORDER — POLYETHYLENE GLYCOL 3350 17 G/17G
17 POWDER, FOR SOLUTION ORAL AT BEDTIME
Refills: 0 | Status: ACTIVE | OUTPATIENT
Start: 2025-07-03 | End: 2026-06-01

## 2025-07-03 RX ORDER — OXYCODONE HYDROCHLORIDE 30 MG/1
1 TABLET ORAL
Qty: 28 | Refills: 0
Start: 2025-07-03

## 2025-07-03 RX ORDER — APREPITANT 40 MG/1
40 CAPSULE ORAL ONCE
Refills: 0 | Status: COMPLETED | OUTPATIENT
Start: 2025-07-03 | End: 2025-07-03

## 2025-07-03 RX ORDER — TRAZODONE HCL 100 MG
50 TABLET ORAL AT BEDTIME
Refills: 0 | Status: ACTIVE | OUTPATIENT
Start: 2025-07-03 | End: 2026-06-01

## 2025-07-03 RX ORDER — ONDANSETRON HCL/PF 4 MG/2 ML
4 VIAL (ML) INJECTION ONCE
Refills: 0 | Status: DISCONTINUED | OUTPATIENT
Start: 2025-07-03 | End: 2025-07-03

## 2025-07-03 RX ORDER — CEFAZOLIN SODIUM IN 0.9 % NACL 3 G/100 ML
2000 INTRAVENOUS SOLUTION, PIGGYBACK (ML) INTRAVENOUS
Refills: 0 | Status: COMPLETED | OUTPATIENT
Start: 2025-07-03 | End: 2025-07-03

## 2025-07-03 RX ORDER — CELECOXIB 50 MG/1
200 CAPSULE ORAL EVERY 12 HOURS
Refills: 0 | Status: ACTIVE | OUTPATIENT
Start: 2025-07-05 | End: 2026-06-03

## 2025-07-03 RX ORDER — OXYCODONE HYDROCHLORIDE 30 MG/1
10 TABLET ORAL
Refills: 0 | Status: DISCONTINUED | OUTPATIENT
Start: 2025-07-03 | End: 2025-07-04

## 2025-07-03 RX ORDER — ACETAMINOPHEN 500 MG/5ML
975 LIQUID (ML) ORAL EVERY 8 HOURS
Refills: 0 | Status: ACTIVE | OUTPATIENT
Start: 2025-07-03 | End: 2026-06-01

## 2025-07-03 RX ORDER — SENNA 187 MG
2 TABLET ORAL AT BEDTIME
Refills: 0 | Status: ACTIVE | OUTPATIENT
Start: 2025-07-03 | End: 2026-06-01

## 2025-07-03 RX ORDER — FLUOXETINE HYDROCHLORIDE 20 MG/1
20 CAPSULE ORAL DAILY
Refills: 0 | Status: ACTIVE | OUTPATIENT
Start: 2025-07-03 | End: 2026-06-01

## 2025-07-03 RX ORDER — ACETAMINOPHEN 500 MG/5ML
1000 LIQUID (ML) ORAL ONCE
Refills: 0 | Status: COMPLETED | OUTPATIENT
Start: 2025-07-03 | End: 2025-07-04

## 2025-07-03 RX ORDER — APIXABAN 2.5 MG/1
1 TABLET, FILM COATED ORAL
Qty: 56 | Refills: 0
Start: 2025-07-03 | End: 2025-07-30

## 2025-07-03 RX ORDER — TRAMADOL HYDROCHLORIDE 50 MG/1
50 TABLET, FILM COATED ORAL EVERY 4 HOURS
Refills: 0 | Status: DISCONTINUED | OUTPATIENT
Start: 2025-07-03 | End: 2025-07-03

## 2025-07-03 RX ORDER — RISPERIDONE 4 MG
1 TABLET ORAL
Refills: 0 | DISCHARGE

## 2025-07-03 RX ORDER — MAGNESIUM OXIDE 400 MG
0 TABLET ORAL
Refills: 0 | DISCHARGE

## 2025-07-03 RX ORDER — FENTANYL CITRATE-0.9 % NACL/PF 100MCG/2ML
50 SYRINGE (ML) INTRAVENOUS
Refills: 0 | Status: DISCONTINUED | OUTPATIENT
Start: 2025-07-03 | End: 2025-07-03

## 2025-07-03 RX ORDER — OXYCODONE HYDROCHLORIDE 30 MG/1
5 TABLET ORAL
Refills: 0 | Status: DISCONTINUED | OUTPATIENT
Start: 2025-07-03 | End: 2025-07-03

## 2025-07-03 RX ORDER — MAGNESIUM, ALUMINUM HYDROXIDE 200-200 MG
30 TABLET,CHEWABLE ORAL
Refills: 0 | Status: ACTIVE | OUTPATIENT
Start: 2025-07-03 | End: 2026-06-01

## 2025-07-03 RX ORDER — RISPERIDONE 4 MG
2 TABLET ORAL AT BEDTIME
Refills: 0 | Status: ACTIVE | OUTPATIENT
Start: 2025-07-03 | End: 2026-06-01

## 2025-07-03 RX ORDER — CELECOXIB 50 MG/1
1 CAPSULE ORAL
Qty: 42 | Refills: 0
Start: 2025-07-03

## 2025-07-03 RX ORDER — MAGNESIUM HYDROXIDE 400 MG/5ML
30 SUSPENSION ORAL DAILY
Refills: 0 | Status: ACTIVE | OUTPATIENT
Start: 2025-07-03 | End: 2026-06-01

## 2025-07-03 RX ORDER — B1/B2/B3/B5/B6/B12/VIT C/FOLIC 500-0.5 MG
1 TABLET ORAL
Refills: 0 | DISCHARGE

## 2025-07-03 RX ORDER — FLUTICASONE FUROATE, UMECLIDINIUM BROMIDE AND VILANTEROL TRIFENATATE 100; 62.5; 25 UG/1; UG/1; UG/1
1 POWDER RESPIRATORY (INHALATION)
Qty: 0 | Refills: 0 | DISCHARGE

## 2025-07-03 RX ORDER — KETOROLAC TROMETHAMINE 30 MG/ML
15 INJECTION, SOLUTION INTRAMUSCULAR; INTRAVENOUS EVERY 6 HOURS
Refills: 0 | Status: DISCONTINUED | OUTPATIENT
Start: 2025-07-03 | End: 2025-07-04

## 2025-07-03 RX ORDER — HYDROMORPHONE/SOD CHLOR,ISO/PF 2 MG/10 ML
0.5 SYRINGE (ML) INJECTION
Refills: 0 | Status: DISCONTINUED | OUTPATIENT
Start: 2025-07-03 | End: 2025-07-03

## 2025-07-03 RX ORDER — APIXABAN 2.5 MG/1
2.5 TABLET, FILM COATED ORAL
Refills: 0 | Status: ACTIVE | OUTPATIENT
Start: 2025-07-04 | End: 2025-07-15

## 2025-07-03 RX ORDER — TRANEXAMIC ACID 1000 MG/10
1000 AMPUL (ML) INTRAVENOUS ONCE
Refills: 0 | Status: DISCONTINUED | OUTPATIENT
Start: 2025-07-03 | End: 2025-07-03

## 2025-07-03 RX ADMIN — Medication 1 APPLICATION(S): at 07:07

## 2025-07-03 RX ADMIN — Medication 975 MILLIGRAM(S): at 21:36

## 2025-07-03 RX ADMIN — Medication 50 MILLIGRAM(S): at 21:35

## 2025-07-03 RX ADMIN — OXYCODONE HYDROCHLORIDE 5 MILLIGRAM(S): 30 TABLET ORAL at 21:40

## 2025-07-03 RX ADMIN — Medication 125 MILLILITER(S): at 18:09

## 2025-07-03 RX ADMIN — Medication 975 MILLIGRAM(S): at 21:35

## 2025-07-03 RX ADMIN — Medication 500 MILLILITER(S): at 12:39

## 2025-07-03 RX ADMIN — Medication 2 MILLIGRAM(S): at 21:34

## 2025-07-03 RX ADMIN — Medication 2000 MILLIGRAM(S): at 14:48

## 2025-07-03 RX ADMIN — OXYCODONE HYDROCHLORIDE 5 MILLIGRAM(S): 30 TABLET ORAL at 22:40

## 2025-07-03 RX ADMIN — KETOROLAC TROMETHAMINE 15 MILLIGRAM(S): 30 INJECTION, SOLUTION INTRAMUSCULAR; INTRAVENOUS at 18:23

## 2025-07-03 RX ADMIN — APREPITANT 40 MILLIGRAM(S): 40 CAPSULE ORAL at 07:03

## 2025-07-03 RX ADMIN — KETOROLAC TROMETHAMINE 15 MILLIGRAM(S): 30 INJECTION, SOLUTION INTRAMUSCULAR; INTRAVENOUS at 18:08

## 2025-07-03 RX ADMIN — Medication 2000 MILLIGRAM(S): at 21:34

## 2025-07-03 RX ADMIN — OXYCODONE HYDROCHLORIDE 10 MILLIGRAM(S): 30 TABLET ORAL at 15:28

## 2025-07-03 RX ADMIN — Medication 975 MILLIGRAM(S): at 07:04

## 2025-07-03 RX ADMIN — OXYCODONE HYDROCHLORIDE 10 MILLIGRAM(S): 30 TABLET ORAL at 14:47

## 2025-07-03 RX ADMIN — Medication 2 TABLET(S): at 21:35

## 2025-07-03 NOTE — ASU PREOP CHECKLIST - NS PREOP CHK MONITOR ANESTHESIA CONSENT
REFERRAL INFORMATION:    Referring Provider:  Kimberly    Referring Clinic:  [facility]    Reason for Visit/Diagnosis: BMI 43       FUTURE VISIT INFORMATION:    Appointment Date: 1.20.22    Appointment Time: 9:15 AM     NOTES RECORD STATUS  DETAILS   OFFICE NOTE from Referring Provider Internal 12.15.21 GARDENIA Harris   OFFICE NOTE from Other Specialists N/A    HOSPITAL DISCHARGE SUMMARY/ ED VISITS  N/A    OPERATIVE REPORT N/A    ENDOSCOPY (EGD)  N/A    PERTINENT LABS Internal    PATHOLOGY REPORTS (RELATED) N/A    IMAGING (CT, MRI, US, XR)  N/A        
done

## 2025-07-03 NOTE — CONSULT NOTE ADULT - SUBJECTIVE AND OBJECTIVE BOX
65 y/o female with Hx of COPD, OA, schizophrenia, bipolar disorder, depression, anxiety, she has left knee pain, that has been worsening, she came in here for elective left knee total joint replacement DEMETRIO on 7/3/25 with Dr. Miller s/p procedure, her pain is well controlled, denies fever, chills, chest pain, sob.       Allergies:  	Advil: Drug, Angioedema       Intolerances:  	Paper tray ONLY, no metal utesils or cans please [freetext allergy; no alerts]: Miscellaneous, Unknown, Paper tray ONLY, no metal utesils or cans please    PAST MEDICAL HISTORY:  Bipolar 1 disorder     COPD (chronic obstructive pulmonary disease)     PTSD (post-traumatic stress disorder)     Schizophrenia     Unilateral primary osteoarthritis, left knee.     PAST SURGICAL HISTORY:  History of colonoscopy.     FAMILY HISTORY:  Father  Still living? No  FH: MI (myocardial infarction), Age at diagnosis: 31-40    Mother  Still living? No  FHx: brain cancer, Age at diagnosis: Age Unknown    Sibling  Still living? Yes, Estimated age: Age Unknown  FHx: thyroid disease, Age at diagnosis: Age Unknown.      Social History:   Not a smoker, drinker or using drugs     Home Medications:   * Incomplete Medication History as of 13-Jun-2025 10:35 documented in Structured Notes  · 	albuterol 2.5 mg/3 mL (0.083%) inhalation solution: Last Dose Taken:  , 3 milliliter(s) by nebulizer 4 times a day  · 	FLUoxetine 20 mg oral capsule: Last Dose Taken:  , 1 cap(s) orally once a day  · 	albuterol 90 mcg/inh inhalation aerosol: Last Dose Taken:  , 2 puff(s) inhaled every 4 hours as needed for  shortness of breath and/or wheezing  · 	traZODone 50 mg oral tablet: orally once a day (at bedtime)  · 	Vitamin C 1000 mg oral tablet: 1 tab(s) orally once a day  · 	Multiple Vitamins oral tablet: 1 tab(s) orally once a day  · 	RisperDAL 2 mg oral tablet: 1 tab(s) orally once a day (at bedtime)  · 	magnesium oxide: once a day      Vital Signs Last 24 Hrs  T(C): 36.4 (03 Jul 2025 12:30), Max: 36.5 (03 Jul 2025 06:22)  T(F): 97.5 (03 Jul 2025 12:30), Max: 97.7 (03 Jul 2025 06:22)  HR: 76 (03 Jul 2025 12:30) (69 - 93)  BP: 122/72 (03 Jul 2025 12:30) (111/88 - 151/96)  BP(mean): 86 (03 Jul 2025 12:30) (80 - 100)  RR: 17 (03 Jul 2025 12:30) (14 - 17)  SpO2: 96% (03 Jul 2025 12:30) (94% - 100%)    Parameters below as of 03 Jul 2025 12:30  Patient On (Oxygen Delivery Method): room air        PHYSICAL EXAM:    GENERAL: Middle age female looking comfortable   HEENT: PERRL, +EOMI  NECK: soft, Supple, No JVD  CHEST/LUNG: Clear to auscultate bilaterally; No wheezing  HEART: S1S2+, Regular rate and rhythm; No murmurs  ABDOMEN: Soft, Nontender, Nondistended; Bowel sounds present  EXTREMITIES:  1+ Peripheral Pulses, No edema, left Knee Joint area cover with dressing, no bleeding or soaking   SKIN: No rashes or lesions  NEURO: AAOX3  PSYCH: normal mood

## 2025-07-03 NOTE — DISCHARGE NOTE PROVIDER - NSDCFUADDINST_GEN_ALL_CORE_FT
The patient will be seen in the office between 2-3 weeks for wound check.   **Your first post-operative visit has been scheduled prior to your admission. PLEASE CONTACT OFFICE TO CONFIRM THE APPOINTMENT DATE. Tape will be removed at that time.  **  The silver based dressing is to be removed 7 days from the date of surgery ( 07/10/2025 ).   ** CONTACT THE OFFICE IF THE FOLLOWING DEVELOP:  - the dressing becomes soiled or saturated  - you develop a fever greater that 101F  - the wound becomes red or you develop blistering around the wound  * Patient may shower after post-op day #3 ( 07/06/2025 ).   * The patient will continue home PT consistent with  total knee replacement protocol. Transition to outpatient PT will occur at the time of the first office visit.   * The patient will practice knee extension exercises regularly to minimize hamstring contraction.   * The patient is FULL weight bearing.  *** The patient will continue ELIQUIS for 2 weeks .*** While on blood clot prevention medication, the patient will take daily omeprazole or other similar medication to protect the stomach from irritation.   * The patient will take OXYCODONE  for pain control and adjust according to prescription and patient needs. Patient will continue Tylenol 975mg every 8 hours as needed for pain. Contact the office if pain increases while taking prescribed pain medications or related concerns develop.  * Celebrex will be taken twice daily for 3 weeks for pain control and prevention of excessive bone growth. Additional prescription may be requested at your office follow-up visit.   * The patient will take Senna S while taking oxycodone to prevent narcotic associated constipation.  Additionally, increase water intake (drink at least 8 glasses of water daily) and try adding fiber to the diet by eating fruits, vegetables and foods that are rich in grains. If constipation is experienced, contact the medical/primary care provider to discuss further treatment options.  * To avoid injury at home:  - continue use of rolling walker until cleared by physical therapist  - have family or friend remove all throw rug or objects in hallways that may present a trip hazard.  - if you experience any dizziness or medical concerns, call your medical doctor or  911.  * The implant may activate metal detection devices.   The patient will be seen in the office between 2-3 weeks for wound check.   **Your first post-operative visit has been scheduled prior to your admission. PLEASE CONTACT OFFICE TO CONFIRM THE APPOINTMENT DATE. Tape will be removed at that time.  **  The silver based dressing is to be removed 7 days from the date of surgery ( 07/10/2025 ).   ** CONTACT THE OFFICE IF THE FOLLOWING DEVELOP:  - the dressing becomes soiled or saturated  - you develop a fever greater that 101F  - the wound becomes red or you develop blistering around the wound  * Patient may shower after post-op day #3 ( 07/06/2025 ).   * The patient will continue home PT consistent with  total knee replacement protocol. Transition to outpatient PT will occur at the time of the first office visit.   * The patient will practice knee extension exercises regularly to minimize hamstring contraction.   * The patient is FULL weight bearing.  *** The patient will continue ELIQUIS 2.5mg twice a day for 4 weeks .*** While on blood clot prevention medication, the patient will take daily omeprazole or other similar medication to protect the stomach from irritation.   * The patient will take OXYCODONE  for pain control and adjust according to prescription and patient needs. Patient will continue Tylenol 975mg every 8 hours as needed for pain. Contact the office if pain increases while taking prescribed pain medications or related concerns develop.  * Celebrex will be taken twice daily for 3 weeks for pain control and prevention of excessive bone growth. Additional prescription may be requested at your office follow-up visit.   * The patient will take Senna S while taking oxycodone to prevent narcotic associated constipation.  Additionally, increase water intake (drink at least 8 glasses of water daily) and try adding fiber to the diet by eating fruits, vegetables and foods that are rich in grains. If constipation is experienced, contact the medical/primary care provider to discuss further treatment options.  * To avoid injury at home:  - continue use of rolling walker until cleared by physical therapist  - have family or friend remove all throw rug or objects in hallways that may present a trip hazard.  - if you experience any dizziness or medical concerns, call your medical doctor or  911.  * The implant may activate metal detection devices.   The patient will be seen in the office between 2-3 weeks for wound check.   **Your first post-operative visit has been scheduled prior to your admission. PLEASE CONTACT OFFICE TO CONFIRM THE APPOINTMENT DATE. Tape will be removed at that time.  **  The silver based dressing is to be removed 7 days from the date of surgery ( 07/10/2025 ).   ** CONTACT THE OFFICE IF THE FOLLOWING DEVELOP:  - the dressing becomes soiled or saturated  - you develop a fever greater that 101F  - the wound becomes red or you develop blistering around the wound  * Patient may shower after post-op day #3 ( 07/06/2025 ).   * The patient will continue home PT consistent with  total knee replacement protocol. Transition to outpatient PT will occur at the time of the first office visit.   * The patient will practice knee extension exercises regularly to minimize hamstring contraction.   * The patient is FULL weight bearing.  *** The patient will continue ELIQUIS 2.5mg twice a day for 4 weeks .  * The patient will take OXYCODONE  for pain control and adjust according to prescription and patient needs. Patient will continue Tylenol 975mg every 8 hours as needed for pain. Contact the office if pain increases while taking prescribed pain medications or related concerns develop.  * Celebrex will be taken twice daily for 3 weeks for pain control and prevention of excessive bone growth. Additional prescription may be requested at your office follow-up visit.   * The patient will take Senna S while taking oxycodone to prevent narcotic associated constipation.  Additionally, increase water intake (drink at least 8 glasses of water daily) and try adding fiber to the diet by eating fruits, vegetables and foods that are rich in grains. If constipation is experienced, contact the medical/primary care provider to discuss further treatment options.  * To avoid injury at home:  - continue use of rolling walker until cleared by physical therapist  - have family or friend remove all throw rug or objects in hallways that may present a trip hazard.  - if you experience any dizziness or medical concerns, call your medical doctor or  911.  * The implant may activate metal detection devices.

## 2025-07-03 NOTE — PHYSICAL THERAPY INITIAL EVALUATION ADULT - ADDITIONAL COMMENTS
pt reports living with Fredy who is able to assist. 3 LINUS with handrail and no stairs inside. Independent prior to admission. Owns RW only

## 2025-07-03 NOTE — PATIENT PROFILE ADULT - FALL HARM RISK - HARM RISK INTERVENTIONS
Assistance with ambulation/Assistance OOB with selected safe patient handling equipment/Communicate Risk of Fall with Harm to all staff/Discuss with provider need for PT consult/Monitor gait and stability/Provide patient with walking aids - walker, cane, crutches/Reinforce activity limits and safety measures with patient and family/Sit up slowly, dangle for a short time, stand at bedside before walking/Tailored Fall Risk Interventions/Use of alarms - bed, chair and/or voice tab/Visual Cue: Yellow wristband and red socks/Bed in lowest position, wheels locked, appropriate side rails in place/Call bell, personal items and telephone in reach/Instruct patient to call for assistance before getting out of bed or chair/Non-slip footwear when patient is out of bed/[place] to call system/Physically safe environment - no spills, clutter or unnecessary equipment/Purposeful Proactive Rounding/Room/bathroom lighting operational, light cord in reach

## 2025-07-03 NOTE — ASU PREOP CHECKLIST - SELECT TESTS ORDERED
muscle function - lower Normal   VIII: hearing Normal   IX: soft palate elevation  Normal   IX,X: gag reflex    XI: trapezius strength  5/5   XI: sternocleidomastoid strength 5/5   XI: neck extension strength  5/5   XII: tongue strength  Normal     Motor:  5/5 throughout  Normal bulk and tone    Sensory:  Normal to LT and PP  Vibration moderately decreased in the ankles     Coordination:   FN, FFM and GLYNN decreased bilaterally    Gait:  [place]  Cane in right hand  Left foot drop  Spastic paraparetic gait    DTR:   Right Brachioradialis reflex 0  Left Brachioradialis reflex 0  Right Biceps reflex 1+  Left Biceps reflex 1+  Right Quadriceps reflex 2+  Left Quadriceps reflex 2+  Right Achilles reflex 0  Left Achilles reflex 0    No Arreola's     Laboratory/Radiology:     CBC with Differential:    Lab Results   Component Value Date/Time    WBC 4.1 12/06/2022 02:27 PM    RBC 4.54 12/06/2022 02:27 PM    HGB 15.4 12/06/2022 02:27 PM    HCT 44.2 12/06/2022 02:27 PM     12/06/2022 02:27 PM    MCV 97.4 12/06/2022 02:27 PM    MCH 33.9 12/06/2022 02:27 PM    MCHC 34.8 12/06/2022 02:27 PM    RDW 12.9 12/06/2022 02:27 PM    SEGSPCT 68 02/18/2014 10:30 PM    LYMPHOPCT 14.7 12/06/2022 02:27 PM    MONOPCT 14.5 12/06/2022 02:27 PM    BASOPCT 0.7 12/06/2022 02:27 PM    MONOSABS 0.60 12/06/2022 02:27 PM    LYMPHSABS 0.61 12/06/2022 02:27 PM    EOSABS 0.08 12/06/2022 02:27 PM    BASOSABS 0.03 12/06/2022 02:27 PM     CMP:    Lab Results   Component Value Date/Time     12/06/2022 02:27 PM    K 4.3 12/06/2022 02:27 PM    CL 99 12/06/2022 02:27 PM    CO2 28 12/06/2022 02:27 PM    BUN 11 12/06/2022 02:27 PM    CREATININE 1.1 12/06/2022 02:27 PM    GFRAA >60 03/23/2021 02:03 PM    LABGLOM >60 12/06/2022 02:27 PM    GLUCOSE 87 12/06/2022 02:27 PM    PROT 7.9 12/06/2022 02:27 PM    LABALBU 4.5 12/06/2022 02:27 PM    CALCIUM 10.0 12/06/2022 02:27 PM    BILITOT 0.8 12/06/2022 02:27 PM    ALKPHOS 74 12/06/2022 02:27 PM    AST
Type and Screen

## 2025-07-03 NOTE — DISCHARGE NOTE PROVIDER - NSDCFUSCHEDAPPT_GEN_ALL_CORE_FT
Douglas Miller  [facility]  ORTHOSURG [address]  Scheduled Appointment: 07/03/2025    Douglas Miller  [facility]  ORTHOSURG [address]  Scheduled Appointment: 07/24/2025    [facility]  ORTHOSURG [address]  Scheduled Appointment: 08/14/2025    Raul Will  [facility]  PULMMED [address]  Scheduled Appointment: 09/08/2025    Douglas Miller  [facility]  ORTHOSURG [address]  Scheduled Appointment: 09/25/2025     Douglas Miller  [facility]  ORTHOSURG [address]  Scheduled Appointment: 07/24/2025    [facility]  ORTHOSURG [address]  Scheduled Appointment: 08/14/2025    Raul Will  [facility]  [address]ULMMED [address]  Scheduled Appointment: 09/08/2025    Douglas Miller  [facility]  ORTHOSURG [address]  Scheduled Appointment: 09/25/2025

## 2025-07-03 NOTE — PHYSICAL THERAPY INITIAL EVALUATION ADULT - GENERAL OBSERVATIONS, REHAB EVAL
Pt received in stretcher with patient transport and STEPHEN harrison, + IV + VCBs, breathing on RA in NAD, in 3/10 pain at rest, agreeable to PT evaluation

## 2025-07-03 NOTE — DISCHARGE NOTE PROVIDER - NSDCCPTREATMENT_GEN_ALL_CORE_FT
PRINCIPAL PROCEDURE  Procedure: Arthroplasty, knee, left, total, robot-assisted  Findings and Treatment:

## 2025-07-03 NOTE — PATIENT PROFILE ADULT - NSPROGENPREVTRANSF_GEN_A_NUR
Social Work discharge planning    Per Gladys Combs with [facility], they CAN accept pt at discharge. Pt will need hhc order for cp assess, med compliance, home pt ot please. RN will need to call [facility] [phone] over weekend if discharged with hhc order and discharge date please. Electronically signed by Ursula Collet on 6/2/2023 at 10:58 AM     Addendum-     Sw received call from Roxann at [facility]. They said they tried to verify pt's listed [facility] Medicare, but it came back NOT active. LA spoke to [employer]. They think pt may have traditional Medicare starting 6/1/23. LA sent message to [facility]  Lachelle Null to seek assist.  Lachelle Null advised that she sent SW inquiry to their [facility]'. Pt can NOT have hhc until insurance is verified. Electronically signed by Ursula Collet on 6/2/2023 at 3:26 PM     Addendum-   Per [facility] team \"Patient did elect to go back to straight Medicare as of June 1st.  The patients accounts however will stay as [facility] Medicare for this admission as the patient was admitted prior on May 31st.  Patients Medicare id [id number]\". La notified Evart with [facility]. They CAN accept pt once they have hhc order and RN staff will need to call the main [facility] number [phone] on day of discharge.   Electronically signed by Ursula Collet on 6/2/2023 at 3:49 PM no

## 2025-07-03 NOTE — ASU PREOP CHECKLIST - VIA
Patient seen by Raissa Krueger. Orders for MRI and EKG placed by provider along with referral for cardiology and pulmonology.  Patient will return to clinic 1 month.    stretcher

## 2025-07-03 NOTE — DISCHARGE NOTE PROVIDER - CARE PROVIDER_API CALL
Douglas Miller  Orthopaedic Surgery  [address] [place] [id number]  Phone: [phone]  Fax: [phone]  Follow Up Time:

## 2025-07-03 NOTE — DISCHARGE NOTE PROVIDER - NSDCMRMEDTOKEN_GEN_ALL_CORE_FT
FLUoxetine 20 mg oral capsule: 1 cap(s) orally once a day  magnesium oxide: once a day  Multiple Vitamins oral tablet: 1 tab(s) orally once a day  RisperDAL 2 mg oral tablet: 1 tab(s) orally once a day (at bedtime)  traZODone 50 mg oral tablet: orally once a day (at bedtime)  Trelegy Ellipta 100 mcg-62.5 mcg-25 mcg/inh inhalation powder: 1 inhaled once a day  Vitamin C 1000 mg oral tablet: 1 tab(s) orally once a day   apixaban 2.5 mg oral tablet: 1 tab(s) orally 2 times a day  celecoxib 200 mg oral capsule: 1 cap(s) orally every 12 hours  FLUoxetine 20 mg oral capsule: 1 cap(s) orally once a day  magnesium oxide: once a day  Multiple Vitamins oral tablet: 1 tab(s) orally once a day  oxyCODONE 5 mg oral tablet: 1 tab(s) orally every 6 hours as needed for pain MDD: 4  RisperDAL 2 mg oral tablet: 1 tab(s) orally once a day (at bedtime)  Senna S 50 mg-8.6 mg oral tablet: 2 tab(s) orally once a day  traZODone 50 mg oral tablet: orally once a day (at bedtime)  Trelegy Ellipta 100 mcg-62.5 mcg-25 mcg/inh inhalation powder: 1 inhaler(s) inhaled once a day  Vitamin C 1000 mg oral tablet: 1 tab(s) orally once a day

## 2025-07-03 NOTE — PATIENT PROFILE ADULT - HISTORY OF COVID-19 VACCINATION
What Type Of Note Output Would You Prefer (Optional)?: Standard Output How Severe Is Your Acne?: moderate Is This A New Presentation, Or A Follow-Up?: Acne No

## 2025-07-03 NOTE — DISCHARGE NOTE PROVIDER - HOSPITAL COURSE
The patient underwent a LEFT TOTAL KNEE REPLACEMENT on . The patient received antibiotics consistent with SCIP guidelines. The patient underwent the procedure and had no intra-operative complications. Post-operatively, the patient was seen by medicine and PT. The patient received ELIQUIS for DVTP. The patient received pain medications per orthopedic pain management pathway and the pain was appropriately controlled. The patient did not have any post-operative medical complications. The patient was discharged in stable condition.

## 2025-07-03 NOTE — CONSULT NOTE ADULT - ASSESSMENT
63 y/o female with Hx of COPD, OA, schizophrenia, bipolar disorder, depression, anxiety, she has left knee pain, that has been worsening, she came in here for elective left knee total joint replacement DEMETRIO on 7/3/25 with Dr. Miller s/p procedure.     Plan:     Will monitor for post op hypotension   will monitor for post op blood loss   will management pain and monitor for side effects   will try managing pain without Narcotics if patient require narcotics will monitor for sedation.      OA of the Left knee s/p TKR post op day 0:       - post op no complications  - VS stable  - abx per ortho   - c/w anti hypertensive to be restarted post op day 02 except if blood pressure goes >150 systolic   - c/w IVF x 24 hrs then reassess per ortho  - opiate induced constipation regimen   - encouraging incentive spirometry   -c/w local wound care per ortho   -DVT prophylaxis and Pain meds as per Ortho team   -PT/OT and weight bearing per ortho     Hx of COPD: will continue with albuterol 2.5 mg/3 mL (0.083%) 3 milliliter(s) by nebulizer 4 times a day and albuterol 90 mcg/inh 2 puff(s) inhaled every 4 hours as needed for  shortness of breath and/or wheezing    Schizophrenia, bipolar disorder, depression, anxiety: will continue with FLUoxetine 20 mg once a day, TraZODone 50 mg once a day (at bedtime) and RisperDAL 2 mg once a day (at bedtime)

## 2025-07-03 NOTE — PHYSICAL THERAPY INITIAL EVALUATION ADULT - PRECAUTIONS/LIMITATIONS, REHAB EVAL
fall precautions Please follow up with your primary care doctor in 1-3 days  Please be aware of any new or worsening signs or symptoms that should prompt your return to the ER.      Novel Coronavirus (COVID-19)  The Facts  What is a coronavirus?  Coronaviruses are a large family of viruses that cause illnesses ranging from the common cold  to more severe diseases such as Middle East Respiratory Syndrome (MERS) and Severe Acute  Respiratory Syndrome (SARS).  What is Novel Coronavirus (COVID-19)?  COVID-19 is a new strain of Coronavirus that has not been previously identified in humans. COVID-19  was identified in [place], [place], [place] in December 2019 (COVID-19). COVID-19 has  since been identified outside of [place], in a growing number of countries internationally, including  the [place].  Where can I find the most recent information about COVID-19?  The Centers for Disease Control and Prevention (CDC) is closely monitoring the outbreak caused by the  COVID-19. For the latest information about COVID- 19, visit the CDC website at  https://www.cdc.gov/coronavirus/index.html  How are coronaviruses spread?  Coronaviruses can be transmitted from person-to- person, usually after close contact with an infected person,  for example, in a household, workplace, or healthcare setting via droplets that become airborne after a cough  or sneeze by an affected person. These droplets can then infect a nearby person. It is likely transmission also  occurs by touching recently contaminated surfaces.  What are the symptoms of coronavirus infection?  It depends on the virus, but common signs include fever and/or respiratory symptoms such as  cough and shortness of breath. In more severe cases, infection can cause pneumonia, severe acute  respiratory syndrome, kidney failure and even death. Fortunately, most cases of COVID-19 have an  illness no different than the influenza “flu”. With a majority of these patients having mild symptoms  and overall mortality which appears to be not much different than the flu.  Is there a treatment for a COVID-19?  There is no specific treatment for disease caused by COVID-19. However, many of the symptoms can  be treated based on the patient’s clinical condition. Supportive care for infected persons can be highly  effective.  What can I do to protect myself?  Washing your hands, covering your cough, and disinfecting surfaces are the best precautionary  measures. It is also advisable to avoid close contact with anyone showing symptoms of respiratory  illness such as coughing and sneezing. Those with symptoms should wear a surgical mask when  around others.  What can I do to protect those around me?  If you have been identified as someone who may be infected with COVID-19, we recommend you  follow the self-isolation procedures outlined below to protect those around you and limit the spread  of this virus.   March 3, 2020  Recommendations for Patients Advised to Self-Isolate  for Possible COVID-19 Exposure  We recommend the below precautionary steps from now until 14 days from when you  returned from your travel or date of your last known possible contact:  - Do not go to work, school, or public areas. Avoid using public transportation, ride-sharing, or  taxis.  - As much as possible, separate yourself from other people in your home. If you can, you should  stay in a room and away from other people in your home. Also, you should use a separate  bathroom, if available.  - Wear the supplied mask whenever you are around other people.  - If you have a non-urgent medical appointment, please reschedule for a later date. If the  appointment is urgent, please call the healthcare provider and tell them that you are on selfisolation for possible COVID-19. This will help the healthcare provider’s office take steps to keep  other people from getting infected or exposed. If you can reschedule routine appointments, do  so.  - Wash your hands often with soap and water for at least 15 to 20 seconds or clean your hands  with an alcohol-based hand  that contains 60 to 95% alcohol, covering all surfaces of  your hands and rubbing them together until they feel dry. Soap and water should be used  preferentially if hands are visibly dirty.  - Cover your mouth and nose with a tissue when you cough or sneeze. Throw used tissues in a  lined trash can; immediately wash your hands.  - Avoid touching your eyes, nose, and mouth with your hands.  - Avoid sharing personal household items. You should not share dishes, drinking glasses, cups,  eating utensils, towels, or bedding with other people or pets in your home. After using these  items, they should be washed thoroughly with soap and water.  - Clean and disinfect all “high-touch” surfaces every day. High touch surfaces include counters,  tabletops, doorknobs, light switches, remote controls, bathroom fixtures, toilets, phones,  keyboards, tablets, and bedside tables. Also, clean any surfaces that may have blood, stool, or  body fluids on them.       If you develop worsening symptoms:  - If you develop worsening symptoms, such as severe shortness of breath, please call [phone] option #9. They will assist you in determining your next steps.  During your time on self-isolation do the following:  - Work from home if you are able to so.  - Limit social isolation by talking with friends and family on the phone or with face-time  - Talk with friends and relatives who don’t live with you about supporting each other if one  household has to be quarantined. For example, agree to drop groceries or other supplies at the  front door.  - Exercise and spend time outdoors away from others if able to do so.    Why didn’t I get tested for novel coronavirus (COVID-19)?  The number of available tests is very limited so strict rules exist for who is allowed to be tested.  [facility] has been authorized to perform testing and is currently working hard to be  able to start providing the test. Such testing is currently reserved for patients who have had  contact with someone infected with the virus, or those who are very sick a plus those who have  traveled to areas identified by the Centers for Disease Control and Prevention (CD) and will  require hospitalization.  What should I do now?  If you are well enough to be discharged home and are not in a high risk group to have  contracted the COVID-10, you should care for yourself at home exactly like you would if you  have Influenza “flu”. Follow all the standard guidelines about washing your hands, covering  your cough, etc.  You should return to the Emergency Department if you develop worse symptoms, trouble  breathing, chest pain, and/or a fever that doesn’t improve with over the counter  acetaminophen or ibuprofen.    Shortness of Breath    WHAT YOU NEED TO KNOW:    Shortness of breath is a feeling that you cannot get enough air when you breathe in. You may have this feeling only during activity, or all the time. Your symptoms can range from mild to severe. Shortness of breath may be a sign of a serious health condition that needs immediate care.    DISCHARGE INSTRUCTIONS:    Return to the emergency department if:     Your signs and symptoms are the same or worse within 24 hours of treatment.       The skin over your ribs or on your neck sinks in when you breathe.       You feel confused or dizzy.    Contact your healthcare provider if:     You have new or worsening symptoms.      You have questions or concerns about your condition or care.    Medicines:     Medicines may be used to treat the cause of your symptoms. You may need medicine to treat a bacterial infection or reduce anxiety. Other medicines may be used to open your airway, reduce swelling, or remove extra fluid. If you have a heart condition, you may need medicine to help your heart beat more strongly or regularly.      Take your medicine as directed. Contact your healthcare provider if you think your medicine is not helping or if you have side effects. Tell him or her if you are allergic to any medicine. Keep a list of the medicines, vitamins, and herbs you take. Include the amounts, and when and why you take them. Bring the list or the pill bottles to follow-up visits. Carry your medicine list with you in case of an emergency.    Manage shortness of breath:     Create an action plan. You and your healthcare provider can work together to create a plan for how to handle shortness of breath. The plan can include daily activities, treatment changes, and what to do if you have severe breathing problems.      Lean forward on your elbows when you sit. This helps your lungs expand and may make it easier to breathe.      Use pursed-lip breathing any time you feel short of breath. Breathe in through your nose and then slowly breathe out through your mouth with your lips slightly puckered. It should take you twice as long to breathe out as it did to breathe in.Breathe in Breathe out           Do not smoke. Nicotine and other chemicals in cigarettes and cigars can cause lung damage and make shortness of breath worse. Ask your healthcare provider for information if you currently smoke and need help to quit. E-cigarettes or smokeless tobacco still contain nicotine. Talk to your healthcare provider before you use these products.      Reach or maintain a healthy weight. Your healthcare provider can help you create a safe weight loss plan if you are overweight.      Exercise as directed. Exercise can help your lungs work more easily. Exercise can also help you lose weight if needed. Try to get at least 30 minutes of exercise most days of the week.    Follow up with your healthcare provider or specialist as directed: Write down your questions so you remember to ask them during your visits.       © Copyright [employer] 2019 All illustrations and images included in CareNotes are the copyrighted property of [email or web address] or [employer].

## 2025-07-04 ENCOUNTER — TRANSCRIPTION ENCOUNTER (OUTPATIENT)
Age: 65
End: 2025-07-04

## 2025-07-04 VITALS
TEMPERATURE: 98 F | DIASTOLIC BLOOD PRESSURE: 75 MMHG | HEART RATE: 92 BPM | SYSTOLIC BLOOD PRESSURE: 117 MMHG | RESPIRATION RATE: 18 BRPM | OXYGEN SATURATION: 94 %

## 2025-07-04 PROCEDURE — 27447 TOTAL KNEE ARTHROPLASTY: CPT | Mod: LT

## 2025-07-04 PROCEDURE — 99213 OFFICE O/P EST LOW 20 MIN: CPT

## 2025-07-04 PROCEDURE — 73560 X-RAY EXAM OF KNEE 1 OR 2: CPT

## 2025-07-04 PROCEDURE — C1713: CPT

## 2025-07-04 PROCEDURE — S2900: CPT

## 2025-07-04 PROCEDURE — 36415 COLL VENOUS BLD VENIPUNCTURE: CPT

## 2025-07-04 PROCEDURE — C1776: CPT

## 2025-07-04 PROCEDURE — 0055T BONE SRGRY CMPTR CT/MRI IMAG: CPT

## 2025-07-04 RX ORDER — ACETAMINOPHEN 500 MG/5ML
3 LIQUID (ML) ORAL
Refills: 0
Start: 2025-07-04

## 2025-07-04 RX ADMIN — APIXABAN 2.5 MILLIGRAM(S): 2.5 TABLET, FILM COATED ORAL at 05:12

## 2025-07-04 RX ADMIN — OXYCODONE HYDROCHLORIDE 10 MILLIGRAM(S): 30 TABLET ORAL at 09:50

## 2025-07-04 RX ADMIN — OXYCODONE HYDROCHLORIDE 10 MILLIGRAM(S): 30 TABLET ORAL at 09:18

## 2025-07-04 RX ADMIN — KETOROLAC TROMETHAMINE 15 MILLIGRAM(S): 30 INJECTION, SOLUTION INTRAMUSCULAR; INTRAVENOUS at 05:12

## 2025-07-04 RX ADMIN — Medication 400 MILLIGRAM(S): at 05:13

## 2025-07-04 RX ADMIN — KETOROLAC TROMETHAMINE 15 MILLIGRAM(S): 30 INJECTION, SOLUTION INTRAMUSCULAR; INTRAVENOUS at 05:15

## 2025-07-04 RX ADMIN — Medication 1000 MILLIGRAM(S): at 05:14

## 2025-07-04 RX ADMIN — KETOROLAC TROMETHAMINE 15 MILLIGRAM(S): 30 INJECTION, SOLUTION INTRAMUSCULAR; INTRAVENOUS at 00:39

## 2025-07-04 RX ADMIN — OXYCODONE HYDROCHLORIDE 10 MILLIGRAM(S): 30 TABLET ORAL at 06:34

## 2025-07-04 RX ADMIN — Medication 40 MILLIGRAM(S): at 05:12

## 2025-07-04 RX ADMIN — OXYCODONE HYDROCHLORIDE 10 MILLIGRAM(S): 30 TABLET ORAL at 05:34

## 2025-07-04 RX ADMIN — KETOROLAC TROMETHAMINE 15 MILLIGRAM(S): 30 INJECTION, SOLUTION INTRAMUSCULAR; INTRAVENOUS at 00:40

## 2025-07-04 NOTE — DISCHARGE NOTE NURSING/CASE MANAGEMENT/SOCIAL WORK - FINANCIAL ASSISTANCE
[facility] provides services at a reduced cost to those who are determined to be eligible through [facility]’s financial assistance program. Information regarding [facility]’s financial assistance program can be found by going to [email or web address] or by calling [phone].

## 2025-07-04 NOTE — PROGRESS NOTE ADULT - ASSESSMENT
63 y/o female with Hx of COPD, OA, schizophrenia, bipolar disorder, depression, anxiety, she has left knee pain, that has been worsening, she came in here for elective left knee total joint replacement DEMETRIO on 7/3/25 with Dr. Miller s/p procedure.     Plan:     No post op hypotension, Will continue to monitor for hypotension given patient require pain meds   No visible blood loss, will monitor for post op blood loss   will try managing pain without Narcotics if patient require narcotics will monitor for sedation.         OA of the Left knee s/p TKR post op day 01:       - post op no complications  - VS stable  - abx per ortho   - opiate induced constipation regimen   - encouraging incentive spirometry   -c/w local wound care per ortho   -DVT prophylaxis and Pain meds as per Ortho team   -PT/OT and weight bearing per ortho     Hx of COPD: will continue with albuterol 2.5 mg/3 mL (0.083%) 3 milliliter(s) by nebulizer 4 times a day and albuterol 90 mcg/inh 2 puff(s) inhaled every 4 hours as needed for  shortness of breath and/or wheezing    Schizophrenia, bipolar disorder, depression, anxiety: will continue with FLUoxetine 20 mg once a day, TraZODone 50 mg once a day (at bedtime) and RisperDAL 2 mg once a day (at bedtime).     Patient is stable from the medicine point of view for discharge pending PT and Ortho eval.

## 2025-07-04 NOTE — DISCHARGE NOTE NURSING/CASE MANAGEMENT/SOCIAL WORK - NSDCPEFALRISK_GEN_ALL_CORE
For information on Fall & Injury Prevention, visit: [email or web address] OR  [email or web address] OR  https://www.cdc.gov/steadi/patient.html

## 2025-07-04 NOTE — PROGRESS NOTE ADULT - SUBJECTIVE AND OBJECTIVE BOX
Ortho Post Op Check    Name: ELISE CORNELL    MR #: [id number]    Procedure: Left  total knee arthroplasty   Surgeon: Dr Miller    Pt comfortable without complaints, pain controlled  Denies CP, SOB, N/V, numbness/tingling     General Exam:  Vital Signs Last 24 Hrs  T(C): 36.8 (07-03-25 @ 16:12), Max: 36.8 (07-03-25 @ 16:12)  T(F): 98.3 (07-03-25 @ 16:12), Max: 98.3 (07-03-25 @ 16:12)  HR: 82 (07-03-25 @ 16:12) (67 - 84)  BP: 145/75 (07-03-25 @ 16:12) (111/88 - 162/84)  BP(mean): 92 (07-03-25 @ 13:45) (80 - 100)  RR: 18 (07-03-25 @ 16:12) (14 - 18)  SpO2: 95% (07-03-25 @ 16:12) (93% - 100%)    General: Pt Alert and oriented, NAD, controlled pain.  Left knee mepilex dressings C/D/I. No bleeding.  Pulses: 2+ dorsalis pedis pulse. Cap refill < 2 sec.  Sensation: Grossly intact to light touch without deficit.  Motor: + EHL/FHL/TA/GS      MEDICATIONS  (STANDING):  acetaminophen     Tablet .. 975 milliGRAM(s) Oral every 8 hours  acetaminophen   IVPB .. 1000 milliGRAM(s) IV Intermittent once  ceFAZolin  Injectable. 2000 milliGRAM(s) IV Push <User Schedule>  FLUoxetine 20 milliGRAM(s) Oral daily  fluticasone furoate/umeclidinium/vilanterol 100-62.5-25 MICROgram(s) Inhaler 1 Puff(s) Inhalation daily  ketorolac   Injectable 15 milliGRAM(s) IV Push every 6 hours  pantoprazole    Tablet 40 milliGRAM(s) Oral before breakfast  polyethylene glycol 3350 17 Gram(s) Oral at bedtime  risperiDONE   Tablet 2 milliGRAM(s) Oral at bedtime  senna 2 Tablet(s) Oral at bedtime  sodium chloride 0.9%. 1000 milliLiter(s) (125 mL/Hr) IV Continuous <Continuous>  traZODone 50 milliGRAM(s) Oral at bedtime    MEDICATIONS  (PRN):  aluminum hydroxide/magnesium hydroxide/simethicone Suspension 30 milliLiter(s) Oral four times a day PRN Indigestion  magnesium hydroxide Suspension 30 milliLiter(s) Oral daily PRN Constipation  ondansetron Injectable 4 milliGRAM(s) IV Push every 6 hours PRN Nausea and/or Vomiting  oxyCODONE    IR 5 milliGRAM(s) Oral every 3 hours PRN Moderate Pain (4 - 6)  oxyCODONE    IR 10 milliGRAM(s) Oral every 3 hours PRN Severe Pain (7 - 10)  traMADol 50 milliGRAM(s) Oral every 4 hours PRN Mild Pain (1 - 3)      A/P: 64yFemale POD#0 s/p Left total knee arthroplasty   - Stable  - Pain Control  - DVT ppx: Eliquis  - Post op abx: Ancef  - PT eval pending  - Weight bearing status: WBAT
[facility]ISE [facility]  [id number]    History: 64y Female is status post left total knee arthroplasty on 7/3/2025, POD # 1. Patient is doing well and is comfortable. The patient's pain is controlled using the prescribed pain medications. The patient is participating in physical therapy. Denies nausea, vomiting, chest pain, shortness of breath, abdominal pain or fever. No new complaints.      Vital Signs Last 24 Hrs  T(C): 36.9 (04 Jul 2025 05:38), Max: 36.9 (04 Jul 2025 05:38)  T(F): 98.4 (04 Jul 2025 05:38), Max: 98.4 (04 Jul 2025 05:38)  HR: 87 (04 Jul 2025 05:38) (67 - 87)  BP: 138/76 (04 Jul 2025 05:38) (111/88 - 162/84)  BP(mean): 92 (03 Jul 2025 13:45) (80 - 100)  RR: 18 (04 Jul 2025 05:38) (14 - 18)  SpO2: 94% (04 Jul 2025 05:38) (93% - 100%)    Parameters below as of 04 Jul 2025 05:38  Patient On (Oxygen Delivery Method): room air        Physical exam: The left knee dressing is clean, dry and intact. No drainage or discharge. No erythema is noted. No blistering. No ecchymosis. The calf is supple nontender. No calf tenderness. Sensation to light touch is grossly intact distally. Motor function distally is 5/5. Extensor hallucis longus and flexor hallucis longus are intact. No foot drop. 2+ dorsalis pedis pulse. Capillary refill is less than 2 seconds. No cyanosis.    Primary Orthopedic Assessment:  •	s/p LEFT total knee replacement	pod #1    Plan:   •	DVT prophylaxis eliquis 2.5mg bid x 4 weeks, including use of compression devices and ankle pumps  •	Continue physical therapy  •	Weightbearing as tolerated of the left lower extremity with assistance of a walker  •	Incentive spirometry encouraged  •	Pain control as clinically indicated  •	Discharge planning – anticipated discharge is Home today when cleared by PT  
ELISE [facility]    [id number]    64y      Female    Patient is a 64y old  Female who presents with a chief complaint of Left TKA (03 Jul 2025 08:10)      INTERVAL HPI/OVERNIGHT EVENTS:    patient is doing ok, denies fever, chills, chest pain, her pain in her Knee is well controlled     Vital Signs Last 24 Hrs  T(C): 36.6 (04 Jul 2025 08:43), Max: 36.9 (04 Jul 2025 05:38)  T(F): 97.8 (04 Jul 2025 08:43), Max: 98.4 (04 Jul 2025 05:38)  HR: 66 (04 Jul 2025 08:43) (66 - 87)  BP: 125/68 (04 Jul 2025 08:43) (111/88 - 162/84)  BP(mean): 92 (03 Jul 2025 13:45) (80 - 100)  RR: 18 (04 Jul 2025 08:43) (14 - 18)  SpO2: 93% (04 Jul 2025 08:43) (93% - 100%)    Parameters below as of 04 Jul 2025 08:43  Patient On (Oxygen Delivery Method): room air        PHYSICAL EXAM:    GENERAL: Middle age female looking comfortable   HEENT: PERRL, +EOMI  NECK: soft, Supple, No JVD  CHEST/LUNG: Clear to auscultate bilaterally; No wheezing  HEART: S1S2+, Regular rate and rhythm; No murmurs  ABDOMEN: Soft, Nontender, Nondistended; Bowel sounds present  EXTREMITIES:  1+ Peripheral Pulses, No edema, left Knee Joint area cover with dressing, no bleeding or soaking   SKIN: No rashes or lesions  NEURO: AAOX3  PSYCH: normal mood      I&O's Summary    03 Jul 2025 07:01  -  04 Jul 2025 07:00  --------------------------------------------------------  IN: 1875 mL / OUT: 1700 mL / NET: 175 mL        MEDICATIONS  (STANDING):  acetaminophen     Tablet .. 975 milliGRAM(s) Oral every 8 hours  apixaban 2.5 milliGRAM(s) Oral two times a day  FLUoxetine 20 milliGRAM(s) Oral daily  fluticasone furoate/umeclidinium/vilanterol 100-62.5-25 MICROgram(s) Inhaler 1 Puff(s) Inhalation daily  ketorolac   Injectable 15 milliGRAM(s) IV Push every 6 hours  pantoprazole    Tablet 40 milliGRAM(s) Oral before breakfast  polyethylene glycol 3350 17 Gram(s) Oral at bedtime  risperiDONE   Tablet 2 milliGRAM(s) Oral at bedtime  senna 2 Tablet(s) Oral at bedtime  sodium chloride 0.9%. 1000 milliLiter(s) (125 mL/Hr) IV Continuous <Continuous>  traZODone 50 milliGRAM(s) Oral at bedtime    MEDICATIONS  (PRN):  aluminum hydroxide/magnesium hydroxide/simethicone Suspension 30 milliLiter(s) Oral four times a day PRN Indigestion  magnesium hydroxide Suspension 30 milliLiter(s) Oral daily PRN Constipation  ondansetron Injectable 4 milliGRAM(s) IV Push every 6 hours PRN Nausea and/or Vomiting  oxyCODONE    IR 5 milliGRAM(s) Oral every 3 hours PRN Moderate Pain (4 - 6)  oxyCODONE    IR 10 milliGRAM(s) Oral every 3 hours PRN Severe Pain (7 - 10)  traMADol 50 milliGRAM(s) Oral every 4 hours PRN Mild Pain (1 - 3)

## 2025-07-04 NOTE — DISCHARGE NOTE NURSING/CASE MANAGEMENT/SOCIAL WORK - PATIENT PORTAL LINK FT
You can access the FollowMyHealth Patient Portal offered by [facility] by registering at the following website: [email or web address]. By joining [employer]’s FollowMyHealth portal, you will also be able to view your health information using other applications (apps) compatible with our system.

## 2025-07-10 ENCOUNTER — NON-APPOINTMENT (OUTPATIENT)
Age: 65
End: 2025-07-10

## 2025-07-10 RX ORDER — OXYCODONE 5 MG/1
5 TABLET ORAL EVERY 6 HOURS
Qty: 28 | Refills: 0 | Status: ACTIVE | COMMUNITY
Start: 2025-07-10 | End: 1900-01-01

## 2025-07-24 ENCOUNTER — APPOINTMENT (OUTPATIENT)
Dept: ORTHOPEDIC SURGERY | Facility: CLINIC | Age: 65
End: 2025-07-24
Payer: MEDICARE

## 2025-07-24 DIAGNOSIS — Z96.652 PRESENCE OF LEFT ARTIFICIAL KNEE JOINT: ICD-10-CM

## 2025-07-24 PROCEDURE — 73562 X-RAY EXAM OF KNEE 3: CPT | Mod: LT

## 2025-07-24 PROCEDURE — 99024 POSTOP FOLLOW-UP VISIT: CPT

## 2025-08-07 ENCOUNTER — NON-APPOINTMENT (OUTPATIENT)
Age: 65
End: 2025-08-07

## 2025-08-14 ENCOUNTER — APPOINTMENT (OUTPATIENT)
Dept: ORTHOPEDIC SURGERY | Facility: CLINIC | Age: 65
End: 2025-08-14

## 2025-08-19 ENCOUNTER — APPOINTMENT (OUTPATIENT)
Dept: ORTHOPEDIC SURGERY | Facility: CLINIC | Age: 65
End: 2025-08-19
Payer: MEDICARE

## 2025-08-19 DIAGNOSIS — Z96.652 PRESENCE OF LEFT ARTIFICIAL KNEE JOINT: ICD-10-CM

## 2025-08-19 PROCEDURE — 99024 POSTOP FOLLOW-UP VISIT: CPT

## 2025-08-19 PROCEDURE — 73562 X-RAY EXAM OF KNEE 3: CPT | Mod: LT

## 2025-09-08 ENCOUNTER — APPOINTMENT (OUTPATIENT)
Dept: PULMONOLOGY | Facility: CLINIC | Age: 65
End: 2025-09-08

## 2025-09-16 ENCOUNTER — NON-APPOINTMENT (OUTPATIENT)
Age: 65
End: 2025-09-16

## (undated) DEVICE — DRAPE IOBAN 33" X 23"

## (undated) DEVICE — HOOD T7 NON-PEELAWAY

## (undated) DEVICE — ZIMMER PULSAVAC PLUS FAN KIT

## (undated) DEVICE — SUT VICRYL 2-0 27" CT-2 UNDYED

## (undated) DEVICE — DRSG ACE BANDAGE 6"

## (undated) DEVICE — SUT STRATAFIX SPIRAL PDS PLUS 1 35X35CM CTX VIOLET

## (undated) DEVICE — GLV 8 PROTEXIS (BLUE)

## (undated) DEVICE — DRAPE 1/2 SHEET 40X57"

## (undated) DEVICE — DRSG MEPILEX 10 X 25CM (4 X 10") POST OP AG SILVER

## (undated) DEVICE — WOUND IRR SURGIPHOR

## (undated) DEVICE — SLING SHOULDER IMMOBILIZER CLINIC LARGE

## (undated) DEVICE — SOL IRR POUR H2O 1000ML

## (undated) DEVICE — SOL IRR POUR NS 0.9% 1000ML

## (undated) DEVICE — SUT VICRYL PLUS 0 18" CT-1 UNDYED (POP-OFF)

## (undated) DEVICE — NDL HYPO SAFE 20G X 1.5" (YELLOW)

## (undated) DEVICE — DRSG DERMABOND 0.7ML

## (undated) DEVICE — Device

## (undated) DEVICE — VENODYNE/SCD SLEEVE CALF MEDIUM

## (undated) DEVICE — MAKO VIZADISC KNEE TRACKING KIT

## (undated) DEVICE — DRAPE XL SHEET 77X98"

## (undated) DEVICE — TAPE SILK 3"

## (undated) DEVICE — ELCTR GROUNDING PAD ADULT COVIDIEN

## (undated) DEVICE — MAKO BLADE STANDARD

## (undated) DEVICE — WARMING BLANKET UPPER ADULT

## (undated) DEVICE — MAKO BLADE NARROW

## (undated) DEVICE — DRAPE STICKY U BLUE 60 X 84"

## (undated) DEVICE — SUT STRATAFIX SPIRAL MONOCRYL PLUS 3-0 30CM PS-2 UNDYED

## (undated) DEVICE — MAKO CHECKPOINT KIT FEMORAL / TIBIAL

## (undated) DEVICE — SOL IRR BAG NS 0.9% 3000ML

## (undated) DEVICE — SYR LUER LOK 50CC

## (undated) DEVICE — ELCTR STRYKER NEPTUNE SMOKE EVACUATION PENCIL (GREEN)

## (undated) DEVICE — DRAPE 3/4 SHEET 52X76"

## (undated) DEVICE — PACK TOTAL KNEE

## (undated) DEVICE — GLV 8 PROTEXIS ORTHO (BROWN)